# Patient Record
Sex: MALE | Race: WHITE | NOT HISPANIC OR LATINO | Employment: OTHER | URBAN - NONMETROPOLITAN AREA
[De-identification: names, ages, dates, MRNs, and addresses within clinical notes are randomized per-mention and may not be internally consistent; named-entity substitution may affect disease eponyms.]

---

## 2018-03-14 ENCOUNTER — TELEPHONE (OUTPATIENT)
Dept: GASTROENTEROLOGY | Facility: CLINIC | Age: 78
End: 2018-03-14

## 2018-11-12 RX ORDER — BENAZEPRIL HYDROCHLORIDE 10 MG/1
TABLET ORAL
Qty: 60 TABLET | Refills: 5 | Status: SHIPPED | OUTPATIENT
Start: 2018-11-12 | End: 2019-05-17 | Stop reason: SDUPTHER

## 2018-11-20 ENCOUNTER — TELEPHONE (OUTPATIENT)
Dept: FAMILY MEDICINE CLINIC | Facility: CLINIC | Age: 78
End: 2018-11-20

## 2018-11-20 DIAGNOSIS — R27.0 ATAXIA: Primary | ICD-10-CM

## 2018-12-03 ENCOUNTER — TELEPHONE (OUTPATIENT)
Dept: FAMILY MEDICINE CLINIC | Facility: CLINIC | Age: 78
End: 2018-12-03

## 2018-12-05 ENCOUNTER — CLINICAL SUPPORT (OUTPATIENT)
Dept: FAMILY MEDICINE CLINIC | Facility: CLINIC | Age: 78
End: 2018-12-05

## 2018-12-05 DIAGNOSIS — G50.0 TRIGEMINAL NEURALGIA: Primary | ICD-10-CM

## 2018-12-05 PROCEDURE — 96372 THER/PROPH/DIAG INJ SC/IM: CPT | Performed by: FAMILY MEDICINE

## 2018-12-05 RX ORDER — TRIAMCINOLONE ACETONIDE 40 MG/ML
80 INJECTION, SUSPENSION INTRA-ARTICULAR; INTRAMUSCULAR ONCE
Status: COMPLETED | OUTPATIENT
Start: 2018-12-05 | End: 2018-12-05

## 2018-12-05 RX ADMIN — TRIAMCINOLONE ACETONIDE 80 MG: 40 INJECTION, SUSPENSION INTRA-ARTICULAR; INTRAMUSCULAR at 13:55

## 2018-12-11 ENCOUNTER — TELEPHONE (OUTPATIENT)
Dept: FAMILY MEDICINE CLINIC | Facility: CLINIC | Age: 78
End: 2018-12-11

## 2019-01-02 ENCOUNTER — OUTSIDE FACILITY SERVICE (OUTPATIENT)
Dept: FAMILY MEDICINE CLINIC | Facility: CLINIC | Age: 79
End: 2019-01-02

## 2019-01-02 PROCEDURE — G0180 MD CERTIFICATION HHA PATIENT: HCPCS | Performed by: FAMILY MEDICINE

## 2019-01-14 ENCOUNTER — OFFICE VISIT (OUTPATIENT)
Dept: FAMILY MEDICINE CLINIC | Facility: CLINIC | Age: 79
End: 2019-01-14

## 2019-01-14 VITALS
DIASTOLIC BLOOD PRESSURE: 69 MMHG | HEART RATE: 92 BPM | TEMPERATURE: 97.7 F | OXYGEN SATURATION: 95 % | HEIGHT: 71 IN | WEIGHT: 175.6 LBS | SYSTOLIC BLOOD PRESSURE: 118 MMHG | BODY MASS INDEX: 24.58 KG/M2 | RESPIRATION RATE: 20 BRPM

## 2019-01-14 DIAGNOSIS — L57.0 ACTINIC KERATOSIS OF LEFT TEMPLE: Primary | ICD-10-CM

## 2019-01-14 DIAGNOSIS — I10 ESSENTIAL HYPERTENSION: ICD-10-CM

## 2019-01-14 DIAGNOSIS — N18.30 CKD (CHRONIC KIDNEY DISEASE) STAGE 3, GFR 30-59 ML/MIN (HCC): ICD-10-CM

## 2019-01-14 PROCEDURE — 99212 OFFICE O/P EST SF 10 MIN: CPT | Performed by: NURSE PRACTITIONER

## 2019-01-22 ENCOUNTER — PROCEDURE VISIT (OUTPATIENT)
Dept: FAMILY MEDICINE CLINIC | Facility: CLINIC | Age: 79
End: 2019-01-22

## 2019-01-22 VITALS
DIASTOLIC BLOOD PRESSURE: 60 MMHG | WEIGHT: 175 LBS | HEIGHT: 71 IN | BODY MASS INDEX: 24.5 KG/M2 | HEART RATE: 100 BPM | OXYGEN SATURATION: 96 % | SYSTOLIC BLOOD PRESSURE: 140 MMHG

## 2019-01-22 DIAGNOSIS — D22.39: Primary | ICD-10-CM

## 2019-01-22 DIAGNOSIS — G50.0 TRIGEMINAL NEURALGIA: ICD-10-CM

## 2019-01-22 PROCEDURE — 11310 SHAVE SKIN LESION 0.5 CM/<: CPT | Performed by: FAMILY MEDICINE

## 2019-01-22 RX ORDER — METHYLPREDNISOLONE ACETATE 80 MG/ML
80 INJECTION, SUSPENSION INTRA-ARTICULAR; INTRALESIONAL; INTRAMUSCULAR; SOFT TISSUE ONCE
Status: DISCONTINUED | OUTPATIENT
Start: 2019-01-22 | End: 2019-05-15

## 2019-01-22 RX ORDER — NEOMYCIN SULFATE, POLYMYXIN B SULFATE, AND DEXAMETHASONE 3.5; 10000; 1 MG/G; [USP'U]/G; MG/G
OINTMENT OPHTHALMIC
COMMUNITY
Start: 2019-01-18 | End: 2019-05-15

## 2019-01-23 DIAGNOSIS — L57.0 ACTINIC KERATOSIS OF LEFT CHEEK: Primary | ICD-10-CM

## 2019-01-25 ENCOUNTER — DOCUMENTATION (OUTPATIENT)
Dept: FAMILY MEDICINE CLINIC | Facility: CLINIC | Age: 79
End: 2019-01-25

## 2019-01-25 DIAGNOSIS — L57.0 ACTINIC KERATOSIS OF LEFT CHEEK: ICD-10-CM

## 2019-04-12 RX ORDER — LEVOTHYROXINE SODIUM 0.05 MG/1
TABLET ORAL
Qty: 90 TABLET | Refills: 1 | Status: SHIPPED | OUTPATIENT
Start: 2019-04-12 | End: 2019-05-15 | Stop reason: SDUPTHER

## 2019-05-15 ENCOUNTER — OFFICE VISIT (OUTPATIENT)
Dept: FAMILY MEDICINE CLINIC | Facility: CLINIC | Age: 79
End: 2019-05-15

## 2019-05-15 VITALS
WEIGHT: 165 LBS | TEMPERATURE: 98.7 F | HEART RATE: 78 BPM | OXYGEN SATURATION: 98 % | SYSTOLIC BLOOD PRESSURE: 124 MMHG | HEIGHT: 71 IN | BODY MASS INDEX: 23.1 KG/M2 | DIASTOLIC BLOOD PRESSURE: 72 MMHG

## 2019-05-15 DIAGNOSIS — N18.30 CKD (CHRONIC KIDNEY DISEASE) STAGE 3, GFR 30-59 ML/MIN (HCC): ICD-10-CM

## 2019-05-15 DIAGNOSIS — I10 ESSENTIAL HYPERTENSION: ICD-10-CM

## 2019-05-15 DIAGNOSIS — G50.0 TRIGEMINAL NEURALGIA: Primary | ICD-10-CM

## 2019-05-15 PROCEDURE — 96372 THER/PROPH/DIAG INJ SC/IM: CPT | Performed by: FAMILY MEDICINE

## 2019-05-15 PROCEDURE — 99213 OFFICE O/P EST LOW 20 MIN: CPT | Performed by: FAMILY MEDICINE

## 2019-05-15 RX ORDER — DOXYCYCLINE 100 MG/1
1 CAPSULE ORAL 2 TIMES DAILY
COMMUNITY
Start: 2019-04-15 | End: 2019-07-01 | Stop reason: SINTOL

## 2019-05-15 RX ORDER — METHYLPREDNISOLONE ACETATE 80 MG/ML
80 INJECTION, SUSPENSION INTRA-ARTICULAR; INTRALESIONAL; INTRAMUSCULAR; SOFT TISSUE ONCE
Status: COMPLETED | OUTPATIENT
Start: 2019-05-15 | End: 2019-05-15

## 2019-05-15 RX ORDER — OXYCODONE AND ACETAMINOPHEN 10; 325 MG/1; MG/1
1 TABLET ORAL EVERY 8 HOURS PRN
COMMUNITY
End: 2019-07-01 | Stop reason: SINTOL

## 2019-05-15 RX ADMIN — METHYLPREDNISOLONE ACETATE 80 MG: 80 INJECTION, SUSPENSION INTRA-ARTICULAR; INTRALESIONAL; INTRAMUSCULAR; SOFT TISSUE at 12:34

## 2019-05-18 RX ORDER — BENAZEPRIL HYDROCHLORIDE 10 MG/1
TABLET ORAL
Qty: 60 TABLET | Refills: 5 | Status: SHIPPED | OUTPATIENT
Start: 2019-05-18 | End: 2019-10-21 | Stop reason: SDUPTHER

## 2019-06-27 ENCOUNTER — HOSPITAL ENCOUNTER (EMERGENCY)
Age: 79
Discharge: HOME | End: 2019-06-27
Attending: EMERGENCY MEDICINE
Payer: MEDICARE

## 2019-06-27 VITALS
DIASTOLIC BLOOD PRESSURE: 78 MMHG | OXYGEN SATURATION: 98 % | SYSTOLIC BLOOD PRESSURE: 143 MMHG | TEMPERATURE: 98 F | HEART RATE: 78 BPM | RESPIRATION RATE: 20 BRPM

## 2019-06-27 DIAGNOSIS — R40.4 TRANSIENT ALTERATION OF AWARENESS: ICD-10-CM

## 2019-06-27 DIAGNOSIS — G47.51 CONFUSIONAL AROUSALS: Primary | ICD-10-CM

## 2019-06-27 LAB
ACETAMINOPHEN LEVEL: <15 UG/ML
ALBUMIN SERPL-MCNC: 4.2 G/DL (ref 3.5–5.2)
ALP BLD-CCNC: 53 U/L (ref 40–130)
ALT SERPL-CCNC: 9 U/L (ref 5–41)
AMPHETAMINE SCREEN, URINE: NEGATIVE
ANION GAP SERPL CALCULATED.3IONS-SCNC: 13 MMOL/L (ref 7–19)
AST SERPL-CCNC: 16 U/L (ref 5–40)
BARBITURATE SCREEN URINE: NEGATIVE
BASOPHILS ABSOLUTE: 0.1 K/UL (ref 0–0.2)
BASOPHILS RELATIVE PERCENT: 0.7 % (ref 0–1)
BENZODIAZEPINE SCREEN, URINE: NEGATIVE
BILIRUB SERPL-MCNC: 0.3 MG/DL (ref 0.2–1.2)
BILIRUBIN URINE: NEGATIVE
BLOOD, URINE: NEGATIVE
BUN BLDV-MCNC: 26 MG/DL (ref 8–23)
CALCIUM SERPL-MCNC: 10.3 MG/DL (ref 8.8–10.2)
CANNABINOID SCREEN URINE: NEGATIVE
CHLORIDE BLD-SCNC: 104 MMOL/L (ref 98–111)
CLARITY: CLEAR
CO2: 25 MMOL/L (ref 22–29)
COCAINE METABOLITE SCREEN URINE: NEGATIVE
COLOR: YELLOW
CREAT SERPL-MCNC: 1.8 MG/DL (ref 0.5–1.2)
EOSINOPHILS ABSOLUTE: 0.2 K/UL (ref 0–0.6)
EOSINOPHILS RELATIVE PERCENT: 2.6 % (ref 0–5)
ETHANOL: <10 MG/DL (ref 0–0.08)
GFR NON-AFRICAN AMERICAN: 37
GLUCOSE BLD-MCNC: 122 MG/DL (ref 74–109)
GLUCOSE URINE: NEGATIVE MG/DL
HCT VFR BLD CALC: 40.6 % (ref 42–52)
HEMOGLOBIN: 13.1 G/DL (ref 14–18)
KETONES, URINE: NEGATIVE MG/DL
LEUKOCYTE ESTERASE, URINE: NEGATIVE
LYMPHOCYTES ABSOLUTE: 3.5 K/UL (ref 1.1–4.5)
LYMPHOCYTES RELATIVE PERCENT: 48.6 % (ref 20–40)
Lab: ABNORMAL
MCH RBC QN AUTO: 31.1 PG (ref 27–31)
MCHC RBC AUTO-ENTMCNC: 32.3 G/DL (ref 33–37)
MCV RBC AUTO: 96.4 FL (ref 80–94)
MONOCYTES ABSOLUTE: 1 K/UL (ref 0–0.9)
MONOCYTES RELATIVE PERCENT: 13.5 % (ref 0–10)
NEUTROPHILS ABSOLUTE: 2.5 K/UL (ref 1.5–7.5)
NEUTROPHILS RELATIVE PERCENT: 34.3 % (ref 50–65)
NITRITE, URINE: NEGATIVE
OPIATE SCREEN URINE: POSITIVE
PDW BLD-RTO: 13.2 % (ref 11.5–14.5)
PH UA: 6.5 (ref 5–8)
PLATELET # BLD: 151 K/UL (ref 130–400)
PMV BLD AUTO: 11.7 FL (ref 9.4–12.4)
POTASSIUM SERPL-SCNC: 4.3 MMOL/L (ref 3.5–5)
PROTEIN UA: NEGATIVE MG/DL
RBC # BLD: 4.21 M/UL (ref 4.7–6.1)
SALICYLATE, SERUM: <3 MG/DL (ref 3–10)
SODIUM BLD-SCNC: 142 MMOL/L (ref 136–145)
SPECIFIC GRAVITY UA: 1.02 (ref 1–1.03)
TOTAL PROTEIN: 6.6 G/DL (ref 6.6–8.7)
URINE REFLEX TO CULTURE: NORMAL
UROBILINOGEN, URINE: 0.2 E.U./DL
WBC # BLD: 7.2 K/UL (ref 4.8–10.8)

## 2019-06-27 PROCEDURE — G0480 DRUG TEST DEF 1-7 CLASSES: HCPCS

## 2019-06-27 PROCEDURE — 85025 COMPLETE CBC W/AUTO DIFF WBC: CPT

## 2019-06-27 PROCEDURE — 99284 EMERGENCY DEPT VISIT MOD MDM: CPT

## 2019-06-27 PROCEDURE — 80307 DRUG TEST PRSMV CHEM ANLYZR: CPT

## 2019-06-27 PROCEDURE — 81003 URINALYSIS AUTO W/O SCOPE: CPT

## 2019-06-27 PROCEDURE — 80053 COMPREHEN METABOLIC PANEL: CPT

## 2019-06-27 PROCEDURE — 36415 COLL VENOUS BLD VENIPUNCTURE: CPT

## 2019-06-27 PROCEDURE — 99283 EMERGENCY DEPT VISIT LOW MDM: CPT | Performed by: EMERGENCY MEDICINE

## 2019-06-27 RX ORDER — GABAPENTIN 300 MG/1
800 CAPSULE ORAL 2 TIMES DAILY
COMMUNITY

## 2019-06-27 RX ORDER — LAMOTRIGINE 25 MG/1
25 TABLET ORAL DAILY
COMMUNITY

## 2019-06-27 RX ORDER — HYDROCODONE BITARTRATE AND ACETAMINOPHEN 5; 325 MG/1; MG/1
1 TABLET ORAL EVERY 6 HOURS PRN
COMMUNITY

## 2019-06-27 RX ORDER — BENAZEPRIL HYDROCHLORIDE 10 MG/1
10 TABLET ORAL 2 TIMES DAILY
COMMUNITY

## 2019-06-27 ASSESSMENT — ENCOUNTER SYMPTOMS
ABDOMINAL PAIN: 0
SINUS PRESSURE: 0
VOMITING: 0
CONSTIPATION: 0
BACK PAIN: 0
WHEEZING: 0
PHOTOPHOBIA: 0
NAUSEA: 0
DIARRHEA: 0
TROUBLE SWALLOWING: 0
EYE PAIN: 0
CHEST TIGHTNESS: 0
COLOR CHANGE: 0
SHORTNESS OF BREATH: 0

## 2019-06-27 NOTE — ED PROVIDER NOTES
Musculoskeletal: Negative for arthralgias, back pain, myalgias, neck pain and neck stiffness. Skin: Negative for color change, pallor and rash. Neurological: Negative for dizziness, facial asymmetry, speech difficulty, weakness, light-headedness, numbness and headaches. Psychiatric/Behavioral: Positive for agitation, behavioral problems and confusion. Negative for hallucinations and suicidal ideas. The patient is not nervous/anxious. PAST MEDICALHISTORY     Past Medical History:   Diagnosis Date    Hypertension     Thyroid disease     Trigeminal neuropathy          SURGICAL HISTORY     History reviewed. No pertinent surgical history. CURRENT MEDICATIONS     Previous Medications    BENAZEPRIL (LOTENSIN) 10 MG TABLET    Take 10 mg by mouth 2 times daily    DOXYCYCLINE PO    Take by mouth    GABAPENTIN (NEURONTIN) 300 MG CAPSULE    Take 300 mg by mouth 3 times daily. HYDROCODONE-ACETAMINOPHEN (NORCO) 5-325 MG PER TABLET    Take 1 tablet by mouth every 6 hours as needed for Pain. LAMOTRIGINE (LAMICTAL) 25 MG TABLET    Take 25 mg by mouth daily       ALLERGIES     Patient has no known allergies. FAMILY HISTORY     History reviewed. No pertinent family history.        SOCIAL HISTORY       Social History     Socioeconomic History    Marital status:      Spouse name: None    Number of children: None    Years of education: None    Highest education level: None   Occupational History    None   Social Needs    Financial resource strain: None    Food insecurity:     Worry: None     Inability: None    Transportation needs:     Medical: None     Non-medical: None   Tobacco Use    Smoking status: Never Smoker    Smokeless tobacco: Never Used   Substance and Sexual Activity    Alcohol use: Not Currently    Drug use: Never    Sexual activity: Yes     Partners: Female   Lifestyle    Physical activity:     Days per week: None     Minutes per session: None    Stress: None Relationships    Social connections:     Talks on phone: None     Gets together: None     Attends Yazidism service: None     Active member of club or organization: None     Attends meetings of clubs or organizations: None     Relationship status: None    Intimate partner violence:     Fear of current or ex partner: None     Emotionally abused: None     Physically abused: None     Forced sexual activity: None   Other Topics Concern    None   Social History Narrative    None       SCREENINGS             PHYSICAL EXAM    (up to 7 for level 4, 8 or more for level 5)     ED Triage Vitals [06/27/19 0134]   BP Temp Temp src Pulse Resp SpO2 Height Weight   (!) 167/88 97.6 °F (36.4 °C) -- 90 20 94 % -- --       Physical Exam   Constitutional: He is oriented to person, place, and time. He appears well-developed and well-nourished. No distress. HENT:   Head: Normocephalic and atraumatic. Mouth/Throat: Oropharynx is clear and moist.   Eyes: Pupils are equal, round, and reactive to light. Conjunctivae and EOM are normal.   Neck: Normal range of motion. Neck supple. Cardiovascular: Normal rate, regular rhythm and normal heart sounds. No murmur heard. Pulmonary/Chest: Effort normal and breath sounds normal. He has no wheezes. He has no rales. Abdominal: Soft. Bowel sounds are normal. He exhibits no distension. There is no tenderness. There is no rebound and no guarding. Musculoskeletal: Normal range of motion. He exhibits no edema. Neurological: He is alert and oriented to person, place, and time. No cranial nerve deficit. Skin: Skin is warm and dry. Capillary refill takes less than 2 seconds. Psychiatric: He has a normal mood and affect. His speech is normal and behavior is normal. Judgment and thought content normal. Cognition and memory are normal.   Nursing note and vitals reviewed.       DIAGNOSTIC RESULTS     EKG: All EKG's areinterpreted by the Emergency Department Physician who either signs or believe that pt has dementia or a psychosis. This could very well be related to the medication he is taking. Pt and spouse are in agreement with d/c planning. Patient Progress  Patient progress: stable      Reassessment      CONSULTS:  None    PROCEDURES:  Unless otherwise noted below, none     Procedures    FINAL IMPRESSION      1. Confusional arousals    2.  Transient alteration of awareness          DISPOSITION/PLAN   DISPOSITION Decision To Discharge 06/27/2019 04:37:35 AM      PATIENT REFERRED TO:  [address] [place] [address] EMERGENCY DEPT  [facility]  [phone]  Call in 2 days  As needed      DISCHARGE MEDICATIONS:  New Prescriptions    No medications on file          (Please note that portions of this note were completed with a voice recognition program.  Efforts were made to edit thedictations but occasionally words are mis-transcribed.)    Demond Arboleda MD (electronically signed)  Attending Emergency Physician          Rosalinda Graham MD  06/27/19 6503
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
## 2019-06-27 NOTE — ED NOTES
Patient placed in a gownPatient placed on cardiac monitor, continuous pulse oximeter, and NIBP monitor.  Monitor alarms on.       Betzaida Cortes RN  06/27/19 5522

## 2019-06-27 NOTE — ED NOTES
ASSESSMENT:    PT ALERT/ORIENTED X4. PUPILS EQUAL/REACTIVE    SKIN:  WARM/DRY PINK CAPILLARY REFILL < 2SECS    CARDIAC:  S1 S2 NOTED     LUNGS: CLEAR UPPER AND LOWER LOBES, RESPIRATIONS EVEN/UNLABORED     ABDOMEN: BOWEL SOUNDS NOTED UPPER AND LOWER QUADRANTS                     SOFT AND NONTENDER. EXTREMITIES:  BILATERAL DP AND PT AND NO EDEMA NOTED. NO DISTRESS NOTED. SIDE RAILS UP AND CALL LIGHT IN REACH.      Phylicia Diaz RN  06/27/19 3875
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
## 2019-06-28 ENCOUNTER — TELEPHONE (OUTPATIENT)
Dept: FAMILY MEDICINE CLINIC | Facility: CLINIC | Age: 79
End: 2019-06-28

## 2019-07-01 ENCOUNTER — OFFICE VISIT (OUTPATIENT)
Dept: FAMILY MEDICINE CLINIC | Facility: CLINIC | Age: 79
End: 2019-07-01

## 2019-07-01 VITALS
HEIGHT: 71 IN | HEART RATE: 59 BPM | DIASTOLIC BLOOD PRESSURE: 64 MMHG | SYSTOLIC BLOOD PRESSURE: 130 MMHG | BODY MASS INDEX: 23.72 KG/M2 | WEIGHT: 169.4 LBS | TEMPERATURE: 97.9 F | OXYGEN SATURATION: 97 %

## 2019-07-01 DIAGNOSIS — G50.0 TRIGEMINAL NEURALGIA: Primary | ICD-10-CM

## 2019-07-01 DIAGNOSIS — F03.91 DEMENTIA WITH BEHAVIORAL DISTURBANCE, UNSPECIFIED DEMENTIA TYPE: ICD-10-CM

## 2019-07-01 DIAGNOSIS — R27.0 ATAXIA: ICD-10-CM

## 2019-07-01 DIAGNOSIS — N18.30 CKD (CHRONIC KIDNEY DISEASE) STAGE 3, GFR 30-59 ML/MIN (HCC): ICD-10-CM

## 2019-07-01 PROBLEM — F03.918 DEMENTIA WITH BEHAVIORAL DISTURBANCE: Status: ACTIVE | Noted: 2019-07-01

## 2019-07-01 PROCEDURE — 99214 OFFICE O/P EST MOD 30 MIN: CPT | Performed by: FAMILY MEDICINE

## 2019-07-01 RX ORDER — GABAPENTIN 800 MG/1
800 TABLET ORAL 3 TIMES DAILY
Qty: 270 TABLET | Refills: 1 | Status: SHIPPED | OUTPATIENT
Start: 2019-07-01 | End: 2020-01-17

## 2019-07-01 RX ORDER — DOXYCYCLINE HYCLATE 100 MG/1
100 CAPSULE ORAL DAILY
COMMUNITY
End: 2019-11-20

## 2019-10-21 RX ORDER — LEVOTHYROXINE SODIUM 0.05 MG/1
TABLET ORAL
Qty: 90 TABLET | Refills: 1 | Status: SHIPPED | OUTPATIENT
Start: 2019-10-21 | End: 2019-11-20 | Stop reason: SDUPTHER

## 2019-10-21 RX ORDER — BENAZEPRIL HYDROCHLORIDE 10 MG/1
TABLET ORAL
Qty: 60 TABLET | Refills: 5 | Status: SHIPPED | OUTPATIENT
Start: 2019-10-21 | End: 2020-04-16

## 2019-10-23 ENCOUNTER — TELEPHONE (OUTPATIENT)
Dept: FAMILY MEDICINE CLINIC | Facility: CLINIC | Age: 79
End: 2019-10-23

## 2019-10-23 ENCOUNTER — HOSPITAL ENCOUNTER (OUTPATIENT)
Dept: GENERAL RADIOLOGY | Facility: HOSPITAL | Age: 79
Discharge: HOME | End: 2019-10-23
Admitting: FAMILY MEDICINE

## 2019-10-23 DIAGNOSIS — R07.81 RIB PAIN ON LEFT SIDE: ICD-10-CM

## 2019-10-23 DIAGNOSIS — R07.81 RIB PAIN ON LEFT SIDE: Primary | ICD-10-CM

## 2019-10-23 PROCEDURE — 71101 X-RAY EXAM UNILAT RIBS/CHEST: CPT

## 2019-10-30 ENCOUNTER — TELEPHONE (OUTPATIENT)
Dept: FAMILY MEDICINE CLINIC | Facility: CLINIC | Age: 79
End: 2019-10-30

## 2019-11-20 ENCOUNTER — PROCEDURE VISIT (OUTPATIENT)
Dept: FAMILY MEDICINE CLINIC | Facility: CLINIC | Age: 79
End: 2019-11-20

## 2019-11-20 VITALS
WEIGHT: 170.6 LBS | HEART RATE: 84 BPM | SYSTOLIC BLOOD PRESSURE: 168 MMHG | TEMPERATURE: 98.4 F | OXYGEN SATURATION: 98 % | DIASTOLIC BLOOD PRESSURE: 84 MMHG | BODY MASS INDEX: 23.88 KG/M2 | HEIGHT: 71 IN

## 2019-11-20 DIAGNOSIS — M17.10 KNEE ARTHROPATHY: ICD-10-CM

## 2019-11-20 DIAGNOSIS — C44.92 SCCA (SQUAMOUS CELL CARCINOMA) OF SKIN: Primary | ICD-10-CM

## 2019-11-20 DIAGNOSIS — R27.0 ATAXIA: ICD-10-CM

## 2019-11-20 DIAGNOSIS — R25.1 OCCASIONAL TREMORS: ICD-10-CM

## 2019-11-20 DIAGNOSIS — I10 ESSENTIAL HYPERTENSION: ICD-10-CM

## 2019-11-20 DIAGNOSIS — G50.0 TRIGEMINAL NEURALGIA OF RIGHT SIDE OF FACE: ICD-10-CM

## 2019-11-20 DIAGNOSIS — N18.30 CKD (CHRONIC KIDNEY DISEASE) STAGE 3, GFR 30-59 ML/MIN (HCC): ICD-10-CM

## 2019-11-20 PROCEDURE — 99214 OFFICE O/P EST MOD 30 MIN: CPT | Performed by: FAMILY MEDICINE

## 2019-11-20 PROCEDURE — 96372 THER/PROPH/DIAG INJ SC/IM: CPT | Performed by: FAMILY MEDICINE

## 2019-11-20 RX ORDER — METHYLPREDNISOLONE ACETATE 80 MG/ML
80 INJECTION, SUSPENSION INTRA-ARTICULAR; INTRALESIONAL; INTRAMUSCULAR; SOFT TISSUE ONCE
Status: COMPLETED | OUTPATIENT
Start: 2019-11-20 | End: 2019-11-20

## 2019-11-20 RX ORDER — LAMOTRIGINE 200 MG/1
1 TABLET ORAL 2 TIMES DAILY
COMMUNITY
Start: 2019-10-21 | End: 2020-07-14 | Stop reason: SDUPTHER

## 2019-11-20 RX ORDER — METHYLPREDNISOLONE ACETATE 40 MG/ML
80 INJECTION, SUSPENSION INTRA-ARTICULAR; INTRALESIONAL; INTRAMUSCULAR; SOFT TISSUE ONCE
Status: DISCONTINUED | OUTPATIENT
Start: 2019-11-20 | End: 2019-11-20

## 2019-11-20 RX ORDER — ERYTHROMYCIN 5 MG/G
OINTMENT OPHTHALMIC 2 TIMES DAILY
COMMUNITY
Start: 2019-10-09

## 2019-11-20 RX ADMIN — METHYLPREDNISOLONE ACETATE 80 MG: 80 INJECTION, SUSPENSION INTRA-ARTICULAR; INTRALESIONAL; INTRAMUSCULAR; SOFT TISSUE at 16:45

## 2019-11-25 ENCOUNTER — CLINICAL SUPPORT (OUTPATIENT)
Dept: FAMILY MEDICINE CLINIC | Facility: CLINIC | Age: 79
End: 2019-11-25

## 2019-12-02 LAB
ALBUMIN SERPL-MCNC: 4.7 G/DL (ref 3.5–4.7)
ALBUMIN/GLOB SERPL: 2.8 {RATIO} (ref 1.2–2.2)
ALP SERPL-CCNC: 72 IU/L (ref 39–117)
ALT SERPL-CCNC: 10 IU/L (ref 0–44)
AST SERPL-CCNC: 11 IU/L (ref 0–40)
BASOPHILS # BLD AUTO: 0 X10E3/UL (ref 0–0.2)
BASOPHILS NFR BLD AUTO: 0 %
BILIRUB SERPL-MCNC: 0.3 MG/DL (ref 0–1.2)
BUN SERPL-MCNC: 17 MG/DL (ref 8–27)
BUN/CREAT SERPL: 11 (ref 10–24)
CALCIUM SERPL-MCNC: 10.2 MG/DL (ref 8.6–10.2)
CHLORIDE SERPL-SCNC: 103 MMOL/L (ref 96–106)
CHOLEST SERPL-MCNC: 177 MG/DL (ref 100–199)
CO2 SERPL-SCNC: 22 MMOL/L (ref 20–29)
CREAT SERPL-MCNC: 1.52 MG/DL (ref 0.76–1.27)
EOSINOPHIL # BLD AUTO: 0.1 X10E3/UL (ref 0–0.4)
EOSINOPHIL NFR BLD AUTO: 1 %
ERYTHROCYTE [DISTWIDTH] IN BLOOD BY AUTOMATED COUNT: 14 % (ref 12.3–15.4)
GLOBULIN SER CALC-MCNC: 1.7 G/DL (ref 1.5–4.5)
GLUCOSE SERPL-MCNC: 132 MG/DL (ref 65–99)
HCT VFR BLD AUTO: 44.4 % (ref 37.5–51)
HDLC SERPL-MCNC: 70 MG/DL
HGB BLD-MCNC: 14.8 G/DL (ref 13–17.7)
IMM GRANULOCYTES # BLD AUTO: 0 X10E3/UL (ref 0–0.1)
IMM GRANULOCYTES NFR BLD AUTO: 0 %
LAMOTRIGINE SERPL-MCNC: 15.5 UG/ML (ref 2–20)
LDLC SERPL CALC-MCNC: 93 MG/DL (ref 0–99)
LYMPHOCYTES # BLD AUTO: 3.8 X10E3/UL (ref 0.7–3.1)
LYMPHOCYTES NFR BLD AUTO: 44 %
MCH RBC QN AUTO: 30.5 PG (ref 26.6–33)
MCHC RBC AUTO-ENTMCNC: 33.3 G/DL (ref 31.5–35.7)
MCV RBC AUTO: 92 FL (ref 79–97)
MONOCYTES # BLD AUTO: 1 X10E3/UL (ref 0.1–0.9)
MONOCYTES NFR BLD AUTO: 12 %
NEUTROPHILS # BLD AUTO: 3.6 X10E3/UL (ref 1.4–7)
NEUTROPHILS NFR BLD AUTO: 43 %
PLATELET # BLD AUTO: 160 X10E3/UL (ref 150–450)
POTASSIUM SERPL-SCNC: 4.5 MMOL/L (ref 3.5–5.2)
PROT SERPL-MCNC: 6.4 G/DL (ref 6–8.5)
RBC # BLD AUTO: 4.85 X10E6/UL (ref 4.14–5.8)
SODIUM SERPL-SCNC: 143 MMOL/L (ref 134–144)
T4 SERPL-MCNC: 8 UG/DL (ref 4.5–12)
TRIGL SERPL-MCNC: 71 MG/DL (ref 0–149)
TSH SERPL DL<=0.005 MIU/L-ACNC: 5.45 UIU/ML (ref 0.45–4.5)
VLDLC SERPL CALC-MCNC: 14 MG/DL (ref 5–40)
WBC # BLD AUTO: 8.4 X10E3/UL (ref 3.4–10.8)

## 2020-01-17 DIAGNOSIS — G50.0 TRIGEMINAL NEURALGIA: ICD-10-CM

## 2020-01-17 RX ORDER — GABAPENTIN 800 MG/1
TABLET ORAL
Qty: 270 TABLET | Refills: 1 | Status: SHIPPED | OUTPATIENT
Start: 2020-01-17 | End: 2020-07-07 | Stop reason: SDUPTHER

## 2020-04-16 RX ORDER — BENAZEPRIL HYDROCHLORIDE 10 MG/1
TABLET ORAL
Qty: 60 TABLET | Refills: 5 | Status: SHIPPED | OUTPATIENT
Start: 2020-04-16 | End: 2020-10-05

## 2020-04-16 RX ORDER — LEVOTHYROXINE SODIUM 0.05 MG/1
TABLET ORAL
Qty: 90 TABLET | Refills: 1 | Status: SHIPPED | OUTPATIENT
Start: 2020-04-16 | End: 2020-10-05

## 2020-06-23 ENCOUNTER — OUTSIDE FACILITY SERVICE (OUTPATIENT)
Dept: FAMILY MEDICINE CLINIC | Facility: CLINIC | Age: 80
End: 2020-06-23

## 2020-06-23 ENCOUNTER — CLINICAL SUPPORT (OUTPATIENT)
Dept: FAMILY MEDICINE CLINIC | Facility: CLINIC | Age: 80
End: 2020-06-23

## 2020-06-23 DIAGNOSIS — G50.0 TRIGEMINAL NEURALGIA: ICD-10-CM

## 2020-06-23 DIAGNOSIS — Z12.5 SPECIAL SCREENING FOR MALIGNANT NEOPLASM OF PROSTATE: ICD-10-CM

## 2020-06-23 DIAGNOSIS — Z00.00 MEDICARE ANNUAL WELLNESS VISIT, SUBSEQUENT: ICD-10-CM

## 2020-06-23 DIAGNOSIS — Z79.899 LONG-TERM USE OF HIGH-RISK MEDICATION: Primary | ICD-10-CM

## 2020-06-23 DIAGNOSIS — I10 ESSENTIAL HYPERTENSION: ICD-10-CM

## 2020-06-23 DIAGNOSIS — N18.30 CKD (CHRONIC KIDNEY DISEASE) STAGE 3, GFR 30-59 ML/MIN (HCC): ICD-10-CM

## 2020-06-24 LAB
ALBUMIN SERPL-MCNC: 4.2 G/DL (ref 3.7–4.7)
ALBUMIN/GLOB SERPL: 1.9 {RATIO} (ref 1.2–2.2)
ALP SERPL-CCNC: 65 IU/L (ref 39–117)
ALT SERPL-CCNC: 11 IU/L (ref 0–44)
AST SERPL-CCNC: 16 IU/L (ref 0–40)
BASOPHILS # BLD AUTO: 0 X10E3/UL (ref 0–0.2)
BASOPHILS NFR BLD AUTO: 1 %
BILIRUB SERPL-MCNC: 0.4 MG/DL (ref 0–1.2)
BUN SERPL-MCNC: 15 MG/DL (ref 8–27)
BUN/CREAT SERPL: 8 (ref 10–24)
CALCIUM SERPL-MCNC: 9.7 MG/DL (ref 8.6–10.2)
CHLORIDE SERPL-SCNC: 107 MMOL/L (ref 96–106)
CHOLEST SERPL-MCNC: 153 MG/DL (ref 100–199)
CO2 SERPL-SCNC: 23 MMOL/L (ref 20–29)
CREAT SERPL-MCNC: 1.84 MG/DL (ref 0.76–1.27)
EOSINOPHIL # BLD AUTO: 0 X10E3/UL (ref 0–0.4)
EOSINOPHIL NFR BLD AUTO: 0 %
ERYTHROCYTE [DISTWIDTH] IN BLOOD BY AUTOMATED COUNT: 12.4 % (ref 11.6–15.4)
GLOBULIN SER CALC-MCNC: 2.2 G/DL (ref 1.5–4.5)
GLUCOSE SERPL-MCNC: 99 MG/DL (ref 65–99)
HCT VFR BLD AUTO: 39.9 % (ref 37.5–51)
HDLC SERPL-MCNC: 54 MG/DL
HGB BLD-MCNC: 13.7 G/DL (ref 13–17.7)
IMM GRANULOCYTES # BLD AUTO: 0 X10E3/UL (ref 0–0.1)
IMM GRANULOCYTES NFR BLD AUTO: 0 %
LDLC SERPL CALC-MCNC: 86 MG/DL (ref 0–99)
LDLC/HDLC SERPL: 1.6 RATIO (ref 0–3.6)
LYMPHOCYTES # BLD AUTO: 1.3 X10E3/UL (ref 0.7–3.1)
LYMPHOCYTES NFR BLD AUTO: 25 %
MCH RBC QN AUTO: 31.5 PG (ref 26.6–33)
MCHC RBC AUTO-ENTMCNC: 34.3 G/DL (ref 31.5–35.7)
MCV RBC AUTO: 92 FL (ref 79–97)
MONOCYTES # BLD AUTO: 0.9 X10E3/UL (ref 0.1–0.9)
MONOCYTES NFR BLD AUTO: 18 %
NEUTROPHILS # BLD AUTO: 2.8 X10E3/UL (ref 1.4–7)
NEUTROPHILS NFR BLD AUTO: 56 %
PLATELET # BLD AUTO: 133 X10E3/UL (ref 150–450)
POTASSIUM SERPL-SCNC: 4.4 MMOL/L (ref 3.5–5.2)
PROT SERPL-MCNC: 6.4 G/DL (ref 6–8.5)
PSA SERPL-MCNC: 0.9 NG/ML (ref 0–4)
RBC # BLD AUTO: 4.35 X10E6/UL (ref 4.14–5.8)
SODIUM SERPL-SCNC: 142 MMOL/L (ref 134–144)
T4 SERPL-MCNC: 8 UG/DL (ref 4.5–12)
TRIGL SERPL-MCNC: 67 MG/DL (ref 0–149)
TSH SERPL DL<=0.005 MIU/L-ACNC: 2.55 UIU/ML (ref 0.45–4.5)
VLDLC SERPL CALC-MCNC: 13 MG/DL (ref 5–40)
WBC # BLD AUTO: 5 X10E3/UL (ref 3.4–10.8)

## 2020-06-24 PROCEDURE — 99218 PR INITIAL OBSERVATION CARE/DAY 30 MINUTES: CPT | Performed by: FAMILY MEDICINE

## 2020-06-25 ENCOUNTER — APPOINTMENT (OUTPATIENT)
Dept: GENERAL RADIOLOGY | Age: 80
DRG: 868 | End: 2020-06-25
Attending: HOSPITALIST
Payer: MEDICARE

## 2020-06-25 ENCOUNTER — APPOINTMENT (OUTPATIENT)
Dept: CT IMAGING | Age: 80
DRG: 868 | End: 2020-06-25
Attending: HOSPITALIST
Payer: MEDICARE

## 2020-06-25 ENCOUNTER — HOSPITAL ENCOUNTER (INPATIENT)
Age: 80
LOS: 8 days | Discharge: HOME HEALTH | DRG: 868 | End: 2020-07-03
Attending: HOSPITALIST | Admitting: INTERNAL MEDICINE
Payer: MEDICARE

## 2020-06-25 PROBLEM — R00.1 SYMPTOMATIC BRADYCARDIA: Status: ACTIVE | Noted: 2020-06-25

## 2020-06-25 LAB
ALBUMIN SERPL-MCNC: 3.6 G/DL (ref 3.5–5.2)
ALP BLD-CCNC: 79 U/L (ref 40–130)
ALT SERPL-CCNC: 22 U/L (ref 5–41)
ANION GAP SERPL CALCULATED.3IONS-SCNC: 11 MMOL/L (ref 7–19)
APTT: 34.2 SEC (ref 26–36.2)
AST SERPL-CCNC: 49 U/L (ref 5–40)
BACTERIA: NEGATIVE /HPF
BASE EXCESS ARTERIAL: -4.3 MMOL/L (ref -2–2)
BASOPHILS ABSOLUTE: 0 K/UL (ref 0–0.2)
BASOPHILS RELATIVE PERCENT: 0.9 % (ref 0–1)
BILIRUB SERPL-MCNC: 0.7 MG/DL (ref 0.2–1.2)
BILIRUBIN URINE: NEGATIVE
BLOOD, URINE: ABNORMAL
BUN BLDV-MCNC: 15 MG/DL (ref 8–23)
C-REACTIVE PROTEIN: 5.28 MG/DL (ref 0–0.5)
CALCIUM SERPL-MCNC: 8.8 MG/DL (ref 8.8–10.2)
CARBOXYHEMOGLOBIN ARTERIAL: 2 % (ref 0–5)
CHLORIDE BLD-SCNC: 107 MMOL/L (ref 98–111)
CLARITY: CLEAR
CO2: 21 MMOL/L (ref 22–29)
COLOR: YELLOW
CREAT SERPL-MCNC: 1.7 MG/DL (ref 0.5–1.2)
D DIMER: 4.29 UG/ML FEU (ref 0–0.48)
EOSINOPHILS ABSOLUTE: 0 K/UL (ref 0–0.6)
EOSINOPHILS RELATIVE PERCENT: 0.3 % (ref 0–5)
EPITHELIAL CELLS, UA: 1 /HPF (ref 0–5)
GFR NON-AFRICAN AMERICAN: 39
GLUCOSE BLD-MCNC: 96 MG/DL (ref 74–109)
GLUCOSE URINE: NEGATIVE MG/DL
HBA1C MFR BLD: 5.4 % (ref 4–6)
HCO3 ARTERIAL: 17.8 MMOL/L (ref 22–26)
HCT VFR BLD CALC: 42.5 % (ref 42–52)
HEMOGLOBIN, ART, EXTENDED: 13.6 G/DL (ref 14–18)
HEMOGLOBIN: 13.9 G/DL (ref 14–18)
HYALINE CASTS: 0 /HPF (ref 0–8)
IMMATURE GRANULOCYTES #: 0 K/UL
INR BLD: 1.24 (ref 0.88–1.18)
KETONES, URINE: 15 MG/DL
LACTIC ACID, SEPSIS: 1.4 MG/DL (ref 0.5–1.9)
LACTIC ACID, SEPSIS: 1.5 MG/DL (ref 0.5–1.9)
LEUKOCYTE ESTERASE, URINE: NEGATIVE
LV EF: 60 %
LVEF MODALITY: NORMAL
LYMPHOCYTES ABSOLUTE: 0.9 K/UL (ref 1.1–4.5)
LYMPHOCYTES RELATIVE PERCENT: 27 % (ref 20–40)
MAGNESIUM: 1.8 MG/DL (ref 1.6–2.4)
MCH RBC QN AUTO: 31.4 PG (ref 27–31)
MCHC RBC AUTO-ENTMCNC: 32.7 G/DL (ref 33–37)
MCV RBC AUTO: 95.9 FL (ref 80–94)
METHEMOGLOBIN ARTERIAL: 1.1 %
MONOCYTES ABSOLUTE: 0.3 K/UL (ref 0–0.9)
MONOCYTES RELATIVE PERCENT: 8.6 % (ref 0–10)
NEUTROPHILS ABSOLUTE: 2.2 K/UL (ref 1.5–7.5)
NEUTROPHILS RELATIVE PERCENT: 62.6 % (ref 50–65)
NITRITE, URINE: NEGATIVE
O2 CONTENT ARTERIAL: 18 ML/DL
O2 SAT, ARTERIAL: 94.4 %
O2 THERAPY: ABNORMAL
PCO2 ARTERIAL: 25 MMHG (ref 35–45)
PDW BLD-RTO: 12.8 % (ref 11.5–14.5)
PH ARTERIAL: 7.46 (ref 7.35–7.45)
PH UA: 6 (ref 5–8)
PHOSPHORUS: 2 MG/DL (ref 2.5–4.5)
PLATELET # BLD: 80 K/UL (ref 130–400)
PMV BLD AUTO: 12.4 FL (ref 9.4–12.4)
PO2 ARTERIAL: 65 MMHG (ref 80–100)
POTASSIUM REFLEX MAGNESIUM: 4.1 MMOL/L (ref 3.5–5)
POTASSIUM, WHOLE BLOOD: 4
PRO-BNP: 3287 PG/ML (ref 0–1800)
PROCALCITONIN: 0.35 NG/ML (ref 0–0.09)
PROTEIN UA: 30 MG/DL
PROTHROMBIN TIME: 15.6 SEC (ref 12–14.6)
RBC # BLD: 4.43 M/UL (ref 4.7–6.1)
RBC UA: 37 /HPF (ref 0–4)
SARS-COV-2, NAAT: NOT DETECTED
SEDIMENTATION RATE, ERYTHROCYTE: 4 MM/HR (ref 0–15)
SODIUM BLD-SCNC: 139 MMOL/L (ref 136–145)
SPECIFIC GRAVITY UA: 1.03 (ref 1–1.03)
TOTAL PROTEIN: 5.7 G/DL (ref 6.6–8.7)
TROPONIN: 0.02 NG/ML (ref 0–0.03)
TROPONIN: 0.03 NG/ML (ref 0–0.03)
TROPONIN: 0.03 NG/ML (ref 0–0.03)
TROPONIN: 0.04 NG/ML (ref 0–0.03)
TROPONIN: <0.01 NG/ML (ref 0–0.03)
TROPONIN: <0.01 NG/ML (ref 0–0.03)
TSH REFLEX FT4: 1.99 UIU/ML (ref 0.35–5.5)
UROBILINOGEN, URINE: 1 E.U./DL
WBC # BLD: 3.5 K/UL (ref 4.8–10.8)
WBC UA: 1 /HPF (ref 0–5)

## 2020-06-25 PROCEDURE — 6370000000 HC RX 637 (ALT 250 FOR IP): Performed by: HOSPITALIST

## 2020-06-25 PROCEDURE — 84443 ASSAY THYROID STIM HORMONE: CPT

## 2020-06-25 PROCEDURE — 85610 PROTHROMBIN TIME: CPT

## 2020-06-25 PROCEDURE — 93306 TTE W/DOPPLER COMPLETE: CPT

## 2020-06-25 PROCEDURE — 51702 INSERT TEMP BLADDER CATH: CPT

## 2020-06-25 PROCEDURE — 94640 AIRWAY INHALATION TREATMENT: CPT

## 2020-06-25 PROCEDURE — 2580000003 HC RX 258: Performed by: HOSPITALIST

## 2020-06-25 PROCEDURE — 93970 EXTREMITY STUDY: CPT

## 2020-06-25 PROCEDURE — 83880 ASSAY OF NATRIURETIC PEPTIDE: CPT

## 2020-06-25 PROCEDURE — 81001 URINALYSIS AUTO W/SCOPE: CPT

## 2020-06-25 PROCEDURE — 36415 COLL VENOUS BLD VENIPUNCTURE: CPT

## 2020-06-25 PROCEDURE — 6360000002 HC RX W HCPCS: Performed by: HOSPITALIST

## 2020-06-25 PROCEDURE — 71275 CT ANGIOGRAPHY CHEST: CPT

## 2020-06-25 PROCEDURE — 84132 ASSAY OF SERUM POTASSIUM: CPT

## 2020-06-25 PROCEDURE — 36600 WITHDRAWAL OF ARTERIAL BLOOD: CPT

## 2020-06-25 PROCEDURE — 85730 THROMBOPLASTIN TIME PARTIAL: CPT

## 2020-06-25 PROCEDURE — 6360000002 HC RX W HCPCS: Performed by: INTERNAL MEDICINE

## 2020-06-25 PROCEDURE — 82803 BLOOD GASES ANY COMBINATION: CPT

## 2020-06-25 PROCEDURE — 2000000000 HC ICU R&B

## 2020-06-25 PROCEDURE — 84145 PROCALCITONIN (PCT): CPT

## 2020-06-25 PROCEDURE — 71045 X-RAY EXAM CHEST 1 VIEW: CPT

## 2020-06-25 PROCEDURE — 83036 HEMOGLOBIN GLYCOSYLATED A1C: CPT

## 2020-06-25 PROCEDURE — 99217 PR OBSERVATION CARE DISCHARGE MANAGEMENT: CPT | Performed by: FAMILY MEDICINE

## 2020-06-25 PROCEDURE — 84100 ASSAY OF PHOSPHORUS: CPT

## 2020-06-25 PROCEDURE — 93880 EXTRACRANIAL BILAT STUDY: CPT

## 2020-06-25 PROCEDURE — 85379 FIBRIN DEGRADATION QUANT: CPT

## 2020-06-25 PROCEDURE — 87040 BLOOD CULTURE FOR BACTERIA: CPT

## 2020-06-25 PROCEDURE — 70450 CT HEAD/BRAIN W/O DYE: CPT

## 2020-06-25 PROCEDURE — 83735 ASSAY OF MAGNESIUM: CPT

## 2020-06-25 PROCEDURE — 85652 RBC SED RATE AUTOMATED: CPT

## 2020-06-25 PROCEDURE — 87086 URINE CULTURE/COLONY COUNT: CPT

## 2020-06-25 PROCEDURE — 83605 ASSAY OF LACTIC ACID: CPT

## 2020-06-25 PROCEDURE — 6360000004 HC RX CONTRAST MEDICATION: Performed by: HOSPITALIST

## 2020-06-25 PROCEDURE — 86140 C-REACTIVE PROTEIN: CPT

## 2020-06-25 PROCEDURE — 99283 EMERGENCY DEPT VISIT LOW MDM: CPT | Performed by: INTERNAL MEDICINE

## 2020-06-25 PROCEDURE — U0002 COVID-19 LAB TEST NON-CDC: HCPCS

## 2020-06-25 PROCEDURE — 85025 COMPLETE CBC W/AUTO DIFF WBC: CPT

## 2020-06-25 PROCEDURE — 80053 COMPREHEN METABOLIC PANEL: CPT

## 2020-06-25 PROCEDURE — 84484 ASSAY OF TROPONIN QUANT: CPT

## 2020-06-25 RX ORDER — LANOLIN ALCOHOL/MO/W.PET/CERES
3 CREAM (GRAM) TOPICAL NIGHTLY PRN
Status: DISCONTINUED | OUTPATIENT
Start: 2020-06-25 | End: 2020-06-29

## 2020-06-25 RX ORDER — SODIUM CHLORIDE 0.9 % (FLUSH) 0.9 %
10 SYRINGE (ML) INJECTION PRN
Status: DISCONTINUED | OUTPATIENT
Start: 2020-06-25 | End: 2020-07-03 | Stop reason: HOSPADM

## 2020-06-25 RX ORDER — GABAPENTIN 300 MG/1
300 CAPSULE ORAL 3 TIMES DAILY
Status: DISCONTINUED | OUTPATIENT
Start: 2020-06-25 | End: 2020-07-02

## 2020-06-25 RX ORDER — ATORVASTATIN CALCIUM 40 MG/1
40 TABLET, FILM COATED ORAL NIGHTLY
Status: DISCONTINUED | OUTPATIENT
Start: 2020-06-25 | End: 2020-06-26

## 2020-06-25 RX ORDER — ONDANSETRON 4 MG/1
4 TABLET, ORALLY DISINTEGRATING ORAL EVERY 8 HOURS PRN
Status: DISCONTINUED | OUTPATIENT
Start: 2020-06-25 | End: 2020-07-03 | Stop reason: HOSPADM

## 2020-06-25 RX ORDER — ACETAMINOPHEN 325 MG/1
650 TABLET ORAL EVERY 6 HOURS PRN
Status: DISCONTINUED | OUTPATIENT
Start: 2020-06-25 | End: 2020-06-29

## 2020-06-25 RX ORDER — NITROGLYCERIN 0.4 MG/1
0.4 TABLET SUBLINGUAL EVERY 5 MIN PRN
Status: DISCONTINUED | OUTPATIENT
Start: 2020-06-25 | End: 2020-07-03 | Stop reason: HOSPADM

## 2020-06-25 RX ORDER — NALOXONE HYDROCHLORIDE 0.4 MG/ML
0.4 INJECTION, SOLUTION INTRAMUSCULAR; INTRAVENOUS; SUBCUTANEOUS PRN
Status: DISCONTINUED | OUTPATIENT
Start: 2020-06-25 | End: 2020-07-03 | Stop reason: HOSPADM

## 2020-06-25 RX ORDER — DOBUTAMINE HYDROCHLORIDE 200 MG/100ML
2.5 INJECTION INTRAVENOUS CONTINUOUS
Status: DISCONTINUED | OUTPATIENT
Start: 2020-06-25 | End: 2020-06-28

## 2020-06-25 RX ORDER — HYDRALAZINE HYDROCHLORIDE 20 MG/ML
10 INJECTION INTRAMUSCULAR; INTRAVENOUS EVERY 4 HOURS PRN
Status: DISCONTINUED | OUTPATIENT
Start: 2020-06-25 | End: 2020-07-03 | Stop reason: HOSPADM

## 2020-06-25 RX ORDER — MORPHINE SULFATE 4 MG/ML
1 INJECTION, SOLUTION INTRAMUSCULAR; INTRAVENOUS EVERY 4 HOURS PRN
Status: DISCONTINUED | OUTPATIENT
Start: 2020-06-25 | End: 2020-07-03 | Stop reason: HOSPADM

## 2020-06-25 RX ORDER — GABAPENTIN 400 MG/1
400 CAPSULE ORAL 3 TIMES DAILY
Status: DISCONTINUED | OUTPATIENT
Start: 2020-06-25 | End: 2020-06-25

## 2020-06-25 RX ORDER — DOPAMINE HYDROCHLORIDE 160 MG/100ML
2.5 INJECTION, SOLUTION INTRAVENOUS CONTINUOUS
Status: DISCONTINUED | OUTPATIENT
Start: 2020-06-25 | End: 2020-06-28

## 2020-06-25 RX ORDER — LEVOFLOXACIN 5 MG/ML
250 INJECTION, SOLUTION INTRAVENOUS EVERY 24 HOURS
Status: DISCONTINUED | OUTPATIENT
Start: 2020-06-26 | End: 2020-06-27

## 2020-06-25 RX ORDER — ACETAMINOPHEN 650 MG/1
650 SUPPOSITORY RECTAL EVERY 6 HOURS PRN
Status: DISCONTINUED | OUTPATIENT
Start: 2020-06-25 | End: 2020-06-29

## 2020-06-25 RX ORDER — LEVOTHYROXINE SODIUM 0.05 MG/1
50 TABLET ORAL DAILY
Status: DISCONTINUED | OUTPATIENT
Start: 2020-06-25 | End: 2020-07-03 | Stop reason: HOSPADM

## 2020-06-25 RX ORDER — SODIUM CHLORIDE 0.9 % (FLUSH) 0.9 %
10 SYRINGE (ML) INJECTION EVERY 12 HOURS SCHEDULED
Status: DISCONTINUED | OUTPATIENT
Start: 2020-06-25 | End: 2020-06-25 | Stop reason: SDUPTHER

## 2020-06-25 RX ORDER — MORPHINE SULFATE 4 MG/ML
2 INJECTION, SOLUTION INTRAMUSCULAR; INTRAVENOUS EVERY 4 HOURS PRN
Status: DISCONTINUED | OUTPATIENT
Start: 2020-06-25 | End: 2020-07-03 | Stop reason: HOSPADM

## 2020-06-25 RX ORDER — LEVOFLOXACIN 5 MG/ML
500 INJECTION, SOLUTION INTRAVENOUS EVERY 24 HOURS
Status: DISCONTINUED | OUTPATIENT
Start: 2020-06-25 | End: 2020-06-25 | Stop reason: SDUPTHER

## 2020-06-25 RX ORDER — SODIUM CHLORIDE 0.9 % (FLUSH) 0.9 %
10 SYRINGE (ML) INJECTION EVERY 12 HOURS SCHEDULED
Status: DISCONTINUED | OUTPATIENT
Start: 2020-06-25 | End: 2020-07-03 | Stop reason: HOSPADM

## 2020-06-25 RX ORDER — GABAPENTIN 400 MG/1
800 CAPSULE ORAL 2 TIMES DAILY
Status: DISCONTINUED | OUTPATIENT
Start: 2020-06-25 | End: 2020-06-25 | Stop reason: DRUGHIGH

## 2020-06-25 RX ORDER — SODIUM CHLORIDE 0.9 % (FLUSH) 0.9 %
10 SYRINGE (ML) INJECTION PRN
Status: DISCONTINUED | OUTPATIENT
Start: 2020-06-25 | End: 2020-06-25 | Stop reason: SDUPTHER

## 2020-06-25 RX ORDER — LISINOPRIL 10 MG/1
10 TABLET ORAL DAILY
Status: DISCONTINUED | OUTPATIENT
Start: 2020-06-25 | End: 2020-06-28

## 2020-06-25 RX ORDER — GABAPENTIN 400 MG/1
400 CAPSULE ORAL 2 TIMES DAILY
Status: DISCONTINUED | OUTPATIENT
Start: 2020-06-25 | End: 2020-06-25

## 2020-06-25 RX ORDER — ONDANSETRON 2 MG/ML
4 INJECTION INTRAMUSCULAR; INTRAVENOUS EVERY 6 HOURS PRN
Status: DISCONTINUED | OUTPATIENT
Start: 2020-06-25 | End: 2020-07-03 | Stop reason: HOSPADM

## 2020-06-25 RX ORDER — LAMOTRIGINE 25 MG/1
25 TABLET ORAL DAILY
Status: DISCONTINUED | OUTPATIENT
Start: 2020-06-25 | End: 2020-07-03 | Stop reason: HOSPADM

## 2020-06-25 RX ORDER — LEVOFLOXACIN 5 MG/ML
500 INJECTION, SOLUTION INTRAVENOUS ONCE
Status: COMPLETED | OUTPATIENT
Start: 2020-06-25 | End: 2020-06-25

## 2020-06-25 RX ORDER — IPRATROPIUM BROMIDE AND ALBUTEROL SULFATE 2.5; .5 MG/3ML; MG/3ML
1 SOLUTION RESPIRATORY (INHALATION)
Status: DISCONTINUED | OUTPATIENT
Start: 2020-06-25 | End: 2020-07-03 | Stop reason: HOSPADM

## 2020-06-25 RX ORDER — POLYETHYLENE GLYCOL 3350 17 G/17G
17 POWDER, FOR SOLUTION ORAL DAILY PRN
Status: DISCONTINUED | OUTPATIENT
Start: 2020-06-25 | End: 2020-07-03 | Stop reason: HOSPADM

## 2020-06-25 RX ORDER — LEVOTHYROXINE SODIUM 0.05 MG/1
50 TABLET ORAL DAILY
COMMUNITY

## 2020-06-25 RX ORDER — ASPIRIN 81 MG/1
81 TABLET, CHEWABLE ORAL DAILY
Status: DISCONTINUED | OUTPATIENT
Start: 2020-06-25 | End: 2020-07-03 | Stop reason: HOSPADM

## 2020-06-25 RX ADMIN — LEVOFLOXACIN 500 MG: 5 INJECTION, SOLUTION INTRAVENOUS at 11:43

## 2020-06-25 RX ADMIN — PIPERACILLIN SODIUM AND TAZOBACTAM SODIUM 3.38 G: 3; .375 INJECTION, POWDER, LYOPHILIZED, FOR SOLUTION INTRAVENOUS at 17:05

## 2020-06-25 RX ADMIN — ACETAMINOPHEN 650 MG: 325 TABLET, FILM COATED ORAL at 14:08

## 2020-06-25 RX ADMIN — SODIUM CHLORIDE, PRESERVATIVE FREE 10 ML: 5 INJECTION INTRAVENOUS at 20:04

## 2020-06-25 RX ADMIN — VANCOMYCIN HYDROCHLORIDE 1250 MG: 10 INJECTION, POWDER, LYOPHILIZED, FOR SOLUTION INTRAVENOUS at 16:30

## 2020-06-25 RX ADMIN — IOPAMIDOL 90 ML: 755 INJECTION, SOLUTION INTRAVENOUS at 16:17

## 2020-06-25 RX ADMIN — LAMOTRIGINE 25 MG: 25 TABLET ORAL at 08:13

## 2020-06-25 RX ADMIN — ACETAMINOPHEN 650 MG: 650 SUPPOSITORY RECTAL at 06:47

## 2020-06-25 RX ADMIN — LISINOPRIL 10 MG: 10 TABLET ORAL at 08:13

## 2020-06-25 RX ADMIN — ACETAMINOPHEN 650 MG: 325 TABLET, FILM COATED ORAL at 20:08

## 2020-06-25 RX ADMIN — MORPHINE SULFATE 1 MG: 4 INJECTION INTRAVENOUS at 20:22

## 2020-06-25 RX ADMIN — LEVOTHYROXINE SODIUM 50 MCG: 50 TABLET ORAL at 08:23

## 2020-06-25 RX ADMIN — GABAPENTIN 300 MG: 300 CAPSULE ORAL at 21:00

## 2020-06-25 RX ADMIN — SODIUM CHLORIDE, PRESERVATIVE FREE 1 G: 5 INJECTION INTRAVENOUS at 10:30

## 2020-06-25 RX ADMIN — DOPAMINE HYDROCHLORIDE 2.5 MCG/KG/MIN: 160 INJECTION, SOLUTION INTRAVENOUS at 11:39

## 2020-06-25 RX ADMIN — GABAPENTIN 800 MG: 400 CAPSULE ORAL at 08:14

## 2020-06-25 RX ADMIN — SODIUM CHLORIDE, PRESERVATIVE FREE 10 ML: 5 INJECTION INTRAVENOUS at 08:15

## 2020-06-25 RX ADMIN — IPRATROPIUM BROMIDE AND ALBUTEROL SULFATE 1 AMPULE: .5; 3 SOLUTION RESPIRATORY (INHALATION) at 18:29

## 2020-06-25 RX ADMIN — ASPIRIN 81 MG 81 MG: 81 TABLET ORAL at 08:14

## 2020-06-25 RX ADMIN — ATORVASTATIN CALCIUM 40 MG: 40 TABLET, FILM COATED ORAL at 20:08

## 2020-06-25 ASSESSMENT — PAIN DESCRIPTION - LOCATION
LOCATION: BACK;HIP
LOCATION: BACK

## 2020-06-25 ASSESSMENT — PAIN SCALES - GENERAL
PAINLEVEL_OUTOF10: 0
PAINLEVEL_OUTOF10: 8
PAINLEVEL_OUTOF10: 3
PAINLEVEL_OUTOF10: 0
PAINLEVEL_OUTOF10: 0

## 2020-06-25 ASSESSMENT — ENCOUNTER SYMPTOMS
SHORTNESS OF BREATH: 0
COUGH: 0

## 2020-06-25 ASSESSMENT — PAIN DESCRIPTION - ORIENTATION: ORIENTATION: RIGHT;LEFT;LOWER

## 2020-06-25 ASSESSMENT — PAIN DESCRIPTION - PAIN TYPE: TYPE: CHRONIC PAIN

## 2020-06-25 ASSESSMENT — PAIN DESCRIPTION - DESCRIPTORS
DESCRIPTORS: ACHING
DESCRIPTORS: ACHING

## 2020-06-25 NOTE — PROGRESS NOTES
[facility])ists      Patient:  Terril Cranker  YOB: 1940  Date of Service: 6/25/2020  MRN: [id number]   Acct: [de-identified]   Primary Care Physician: No primary care provider on file. Advance Directive: Full Code  Admit Date: 6/25/2020       Hospital Day: 0    Chief Complaint: No chief complaint on file. Mr. Terril Cranker is a [de-identified]year old  american gentleman. He was referred to us from the [facility] ED by the EP Dr. Madison Mcfadden who also happens to be his PCP. He states that the patient has no history of bradycardia. The patient was on the way back home from being seen in the PCP's office for a routine checkup. The patient and his wife stopped at the store on the way home, and he had waited in the car. When his wife came back he was unarousable. He was seen and assessed at the ED, and was found to have a heart rate of 34-42bpm. He was referred for cardiac evaluation. [address] ER covid test results negative. Subjective: pt seen and examined; confuse, heart rate 40-45    Objective:   VITALS:  BP (!) 188/67   Pulse 66   Temp 101.8 °F (38.8 °C) (Temporal)   Resp 23   Ht 6' (1.829 m)   Wt 186 lb 4 oz (84.5 kg)   SpO2 95%   BMI 25.26 kg/m²   24HR INTAKE/OUTPUT:      Intake/Output Summary (Last 24 hours) at 6/25/2020 0746  Last data filed at 6/25/2020 0600  Gross per 24 hour   Intake 0 ml   Output 400 ml   Net -400 ml     Constitutional: pleasantly confused x 3  HENT:    Head: Normocephalic and atraumatic. Mouth/Throat: Oropharynx is clear and moist. No oropharyngeal exudate. Eyes: Conjunctivae and EOM are normal. Pupils are equal, round, and reactive to light. No scleral icterus. Neck: Normal range of motion. Neck supple. No JVD present. No tracheal deviation present. No thyromegaly present. Cardiovascular: s1s2 sarah     Pulmonary/Chest: Effort normal and breath sounds normal. No stridor. No respiratory distress. No wheezes. No rales. Abdominal: Soft. Bowel sounds are normal. No distension and no mass. There is no tenderness. There is no rebound and no guarding. Musculoskeletal: Normal range of motion. There is no edema or tenderness. Extremities: No edema  Neurological: lethargic, confused  Skin: Skin is warm and dry. No rash noted. Not diaphoretic. No erythema. No pallor. Psychiatric: Normal mood and affect. Behavior is normal. Judgment and thought content normal.     Medications:      DOBUTamine        sodium chloride flush  10 mL Intravenous 2 times per day    atorvastatin  40 mg Oral Nightly    aspirin  81 mg Oral Daily    lisinopril  10 mg Oral Daily    lamoTRIgine  25 mg Oral Daily    levothyroxine  50 mcg Oral Daily    gabapentin  800 mg Oral BID    sodium chloride flush  10 mL Intravenous 2 times per day     sodium chloride flush, acetaminophen **OR** acetaminophen, polyethylene glycol, melatonin, ondansetron **OR** ondansetron, nitroGLYCERIN, sodium chloride flush  DIET CLEAR LIQUID; No Caffeine         Lab and other Data:     Recent Labs     06/25/20 0255   WBC 3.5*   HGB 13.9*   PLT 80*     Recent Labs     06/25/20 0255 06/25/20  0504     --    K 4.1 4.0     --    CO2 21*  --    BUN 15  --    CREATININE 1.7*  --    GLUCOSE 96  --      Recent Labs     06/25/20 0255   AST 49*   ALT 22   BILITOT 0.7   ALKPHOS 79     Troponin T:   Recent Labs     06/25/20 0255 06/25/20  0527   TROPONINI 0.04* 0.03     Pro-BNP: No results for input(s): BNP in the last 72 hours. INR:   Recent Labs     06/25/20 0255   INR 1.24*     ABGs:   Lab Results   Component Value Date    PHART 7.460 06/25/2020    PO2ART 65.0 06/25/2020    MMG8GPW 25.0 06/25/2020     UA:No results for input(s): NITRITE, COLORU, PHUR, LABCAST, WBCUA, RBCUA, MUCUS, TRICHOMONAS, YEAST, BACTERIA, CLARITYU, SPECGRAV, LEUKOCYTESUR, UROBILINOGEN, BILIRUBINUR, BLOODU, GLUCOSEU, AMORPHOUS in the last 72 hours.     Invalid input(s): KETONESU    Imaging:   No orders to display Micro: No results found for: BC, No components found for: LABURINE  Patient Active Problem List    Diagnosis Date Noted    Symptomatic bradycardia 06/25/2020       Assessment/plan:    Active Problems:    Symptomatic bradycardia    fever      Plan:   Symptomatic Bradycardia with NEW RBB and Left Fascicular Blocks - ACS R/O:  Dobutamine GGT for HR>50bpm  Tele  Cardio consult in AM  Trend TnI for 3 more Q3h apart  Will get echo and CXR    FEVER unknown source  covid testing negative at [facility] get blood cx, urine cx,  Will get LA  Will place on empiric abx and     DVT Prophylaxis: heparin      Janneth Bell MD  Hospitalist Service  6/25/2020  7:46 AM

## 2020-06-25 NOTE — H&P
Patient Information:  Patient: Terril Cranker  MRN: [id number]   Acct: [de-identified]  YOB: 1940  Admit Date: 6/25/2020      Date of Service: 6/25/2020  Primary Care Physician: No primary care provider on file. Advance Directive: Full Code  Health Care Proxy: his wife, Mrs. Isacc Garcia:    No chief complaint on file. Referring [de-identified] Sign Out: Dr. Derek Soriano at [facility] ED  Patient has had a heart rate of 30s-40s. Heart rate was up to 87 with agitation. Patient is on Lamictal and Levothyroxine and has Hx of Gabbapentin  Started yesterday after when Dr. Derek Soriano saw him in the office for a physical. He had stopped at the store with his wife on the way home, he waited in the car when she went in. When she came out he was unarousbale. His labs were all WNL, except for Cr at 1.7 which is near his baseline. WBC ~6  Hb ~13  TnI negative  Lactic Acid 1.5  Alcohol was negative, he is not a drinker  Urinalysis was negative  CT Head and Chest looked okay  RBBB new, Lefthemi block new    HPI:  Mr. Terril Cranker is a [de-identified]year old  american gentleman. He was referred to us from the [facility] ED by the EP Dr. Madison Mcfdaden who also happens to be his PCP. He states that the patient has no history of bradycardia. The patient was on the way back home from being seen in the PCP's office for a routine checkup. The patient and his wife stopped at the store on the way home, and he had waited in the car. When his wife came back he was unarousable. He was seen and assessed at the ED, and was found to have a heart rate of 34-42bpm. He was referred for cardiac evaluation. Review of Systems:   Review of Systems   Constitutional: Negative for chills, diaphoresis, fatigue and fever. HENT: Negative for sneezing. Respiratory: Negative for cough and shortness of breath. Cardiovascular: Negative for chest pain. Neurological: Positive for tremors and weakness.  Negative for light-headedness. Psychiatric/Behavioral: The patient is nervous/anxious.       Past Medical History:   Diagnosis Date    Hypertension     Sleep apnea     Thyroid disease     Trigeminal neuropathy      Past Surgical History:   Procedure Laterality Date    CHOLECYSTECTOMY      HERNIA REPAIR       Social History     Tobacco History     Smoking Status  Former Smoker Smoking Tobacco Type  Cigarettes    Smokeless Tobacco Use  Never Used          Alcohol History     Alcohol Use Status  Not Asked          Drug Use     Drug Use Status  Never          Sexual Activity     Sexually Active  Not Currently Partners  Male              Family History:  Deferred    Allergies:  No Known Allergies    Current Facility-Administered Medications   Medication Dose Route Frequency Provider Last Rate Last Dose    DOBUTamine (DOBUTREX) 500 mg in dextrose 5 % 250 mL infusion  2.5 mcg/kg/min Intravenous Continuous Anita Lam MD        sodium chloride flush 0.9 % injection 10 mL  10 mL Intravenous 2 times per day Anita Lam MD        sodium chloride flush 0.9 % injection 10 mL  10 mL Intravenous PRN Anita Lam MD        acetaminophen (TYLENOL) tablet 650 mg  650 mg Oral Q6H PRN Anita Lam MD        Or    acetaminophen (TYLENOL) suppository 650 mg  650 mg Rectal Q6H PRN Anita Lam MD        atorvastatin (LIPITOR) tablet 40 mg  40 mg Oral Nightly Anita Lam MD        [START ON 6/26/2020] aspirin chewable tablet 81 mg  81 mg Oral Daily Anita Lam MD        polyethylene glycol [facility]) packet 17 g  17 g Oral Daily PRN Anita Lam MD        melatonin tablet 3 mg  3 mg Oral Nightly PRN Anita Lam MD        ondansetron (ZOFRAN-ODT) disintegrating tablet 4 mg  4 mg Oral Q8H PRN Anita Lam MD        Or    ondansetron [facility]) injection 4 mg  4 mg Intravenous Q6H PRN Anita Lam MD        nitroGLYCERIN (NITROSTAT) SL tablet 0.4 mg  0.4 mg Sublingual Q5 Min PRN MD Margot Hall lisinopril (PRINIVIL;ZESTRIL) tablet 10 mg  10 mg Oral Daily Josue Alcantar MD        lamoTRIgine (LAMICTAL) tablet 25 mg  25 mg Oral Daily Josue Alcantar MD        levothyroxine (SYNTHROID) tablet 50 mcg  50 mcg Oral Daily Josue Alcantar MD        gabapentin (NEURONTIN) capsule 800 mg  800 mg Oral BID Josue Alcantar MD         Home Medications:  Prior to Admission medications    Medication Sig Start Date End Date Taking? Authorizing Provider   levothyroxine (SYNTHROID) 50 MCG tablet Take 50 mcg by mouth Daily   Yes Historical Provider, MD   gabapentin (NEURONTIN) 300 MG capsule Take 800 mg by mouth 2 times daily. Yes Historical Provider, MD   lamoTRIgine (LAMICTAL) 25 MG tablet Take 25 mg by mouth daily   Yes Historical Provider, MD   benazepril (LOTENSIN) 10 MG tablet Take 10 mg by mouth 2 times daily   Yes Historical Provider, MD   HYDROcodone-acetaminophen (NORCO) 5-325 MG per tablet Take 1 tablet by mouth every 6 hours as needed for Pain. Historical Provider, MD   DOXYCYCLINE PO Take by mouth    Historical Provider, MD         OBJECTIVE:    Vitals:    06/25/20 0226   Pulse: 59   breathing on room air    Pulse 59   Ht 6' (1.829 m)   Wt 186 lb 4 oz (84.5 kg)   BMI 25.26 kg/m²     No intake or output data in the 24 hours ending 06/25/20 0403    Physical Exam  Vitals signs reviewed. Constitutional:       General: He is not in acute distress. Appearance: He is not ill-appearing or toxic-appearing. HENT:      Head: Normocephalic and atraumatic. Nose: No congestion or rhinorrhea. Eyes:      General:         Right eye: No discharge. Left eye: No discharge. Cardiovascular:      Rate and Rhythm: Normal rate and regular rhythm. Heart sounds: No murmur. No friction rub. No gallop. Pulmonary:      Effort: No respiratory distress. Breath sounds: No stridor. No wheezing, rhonchi or rales. Chest:      Chest wall: No tenderness.    Abdominal:      General: Bowel sounds are normal.      Tenderness: There is no abdominal tenderness. There is no guarding or rebound. Skin:     General: Skin is warm. Comments: nondiaphoretic   Psychiatric:         Mood and Affect: Mood normal.         Behavior: Behavior normal.         LABORATORY DATA:    CBC:   Recent Labs     06/25/20 0255   WBC 3.5*   HGB 13.9*   HCT 42.5   PLT 80*     BMP:   Recent Labs     06/25/20 0255      K 4.1      CO2 21*   BUN 15   CREATININE 1.7*   CALCIUM 8.8   PHOS 2.0*     Hepatic Profile:   Recent Labs     06/25/20 0255   AST 49*   ALT 22   BILITOT 0.7   ALKPHOS 79     Coag Panel:   Recent Labs     06/25/20 0255   INR 1.24*   PROTIME 15.6*   APTT 34.2     Cardiac Enzymes:   Recent Labs     06/25/20 0255   TROPONINI 0.04*     Pro-BNP:   Recent Labs     06/25/20 0255   PROBNP 3,287*     A1C: No results for input(s): LABA1C in the last 72 hours.     TSH: Invalid input(s): LABTSH      Urinalysis:   Lab Results   Component Value Date    NITRU Negative 06/27/2019    BLOODU Negative 06/27/2019    SPECGRAV 1.023 06/27/2019    GLUCOSEU Negative 06/27/2019         ASESSMENTS & PLANS:    Patient Active Problem List   Diagnosis    Symptomatic bradycardia   Symptomatic Bradycardia with NEW RBB and Left Fascicular Blocks - ACS R/O:  Dobutamine GGT for HR>50bpm  Tele  Cardio consult in AM  Trend TnI for 3 more Q3h apart  Mag, Phos, TSH, Lipid Panel, HbA1c, CBC with Diff, CMP  CBC and BMP with Reflex for following AM  ASA as per protocol (324-325mg chewed STAT unless on 81ASA daily in which case 162mg chewed STAT if not yet given, THEN from following AM 81mg Daily.)  Statin as per protocol (High intensity Statin, if already on high intensity Stain of Simvasttain 80 continue it, if Lipitor 40+ then Lipitor 80 (if less than 40 just double so long as >=40), if Rosuvastatin 20mg+ then Rosuvastatin 40mg PO QHS (if less than 20 just double so long as >=20mg)  NG SL PRN CP  Defer on TTE to [address] Problems:  Continue current Regimen as indicated       DOBUTamine        sodium chloride flush  10 mL Intravenous 2 times per day    atorvastatin  40 mg Oral Nightly    [START ON 6/26/2020] aspirin  81 mg Oral Daily    lisinopril  10 mg Oral Daily    gabapentin  400 mg Oral BID    lamoTRIgine  25 mg Oral Daily    levothyroxine  50 mcg Oral Daily       Supoportive and Prophylactic Txx:  DVT Prophylaxis: SCDs for now as no PLT count and no COAGS -> is thrombocytopenic just SCDs  GI (PUD) Ppx: not indicated  PT consult for evalutation and Txx as indicated: indicated as soon as stable to do  DIET CLEAR LIQUID; No Caffeine  sodium chloride flush, acetaminophen **OR** acetaminophen, polyethylene glycol, melatonin, ondansetron **OR** ondansetron, nitroGLYCERIN      Critical Care time of >45 minutes  Pt seen/examined and admitted to inpatient status. Inpatient status is used for patients with an expected LOS extending past two midnights due to medical therapy and or critical care needs, otherwise patients are placed to OBServation status. Signed:  Electronically signed by Rudolph Munoz MD on 6/25/20 at 04:39 AM CDT.

## 2020-06-25 NOTE — PROGRESS NOTES
Pharmacy Note  Vancomycin Consult    Max L Rhodia Mohs is a [de-identified] y.o. male started on Vancomycin; consult received from Dr. Divya Becker to manage therapy. Also receiving the following antibiotics: Ceftriaxone, Levofloxacin. Active Problems:    Symptomatic bradycardia  Resolved Problems:    * No resolved hospital problems. *      Allergies:  Patient has no known allergies. Temp max: 101.8    Recent Labs     06/25/20  0255   BUN 15       Recent Labs     06/25/20  0255   CREATININE 1.7*       Recent Labs     06/25/20  0255   WBC 3.5*         Intake/Output Summary (Last 24 hours) at 6/25/2020 1529  Last data filed at 6/25/2020 1400  Gross per 24 hour   Intake 18.57 ml   Output 1100 ml   Net -1081.43 ml       Culture Date Source Results   06/25/20 Blood Sent   06/25/20 Urine Sent       Ht Readings from Last 1 Encounters:   06/25/20 6' (1.829 m)        Wt Readings from Last 1 Encounters:   06/25/20 186 lb 4 oz (84.5 kg)         Body mass index is 25.26 kg/m². Estimated Creatinine Clearance: 38 mL/min (A) (based on SCr of 1.7 mg/dL (H)). Assessment/Plan:  Will initiate vancomycin 1250 mg IV every 24 hours. Timing of trough level will be determined based on culture results, renal function, and clinical response. Thank you for the consult. Will continue to follow.     Electronically signed by Aleah Escalante, [address] [place] [address] on 6/25/2020 at 3:29 PM

## 2020-06-25 NOTE — PROGRESS NOTES
Pharmacy Renal Adjustment    Joseph Pollard is a [de-identified] y.o. male. Pharmacy has renally adjusted medications per protocol. Recent Labs     06/25/20  0255   BUN 15       Recent Labs     06/25/20  0255   CREATININE 1.7*       Estimated Creatinine Clearance: 38 mL/min (A) (based on SCr of 1.7 mg/dL (H)). Height:   Ht Readings from Last 1 Encounters:   06/25/20 6' (1.829 m)     Weight:  Wt Readings from Last 1 Encounters:   06/25/20 186 lb 4 oz (84.5 kg)       CKD stage: Unspecified         Baseline SCr: Unspecified    Plan: Adjust the following medications based on renal function:           Levofloxacin 500 mg IV every 24 hours adjusted to Levofloxacin 500 mg IV x 1 followed by Levofloxacin 250 mg IV daily.      Electronically signed by Kriss Angel [address] [place] [address] on 6/25/2020 at 11:02 AM

## 2020-06-25 NOTE — FLOWSHEET NOTE
06/25/20 1615   Encounter Summary   Services provided to: Patient  (in ICU)   Referral/Consult From: Nurse   Support System Family members   Length of Encounter 15 minutes   Advance Care Planning Yes   Spiritual/Holiness   Type Spiritual support   Assessment Sleeping   Intervention Active listening     Attempted to clarified patient's health care preferences and provide spiritual support. Pt just got back from the test and was asleep. No family present. Will follow up.   Electronically signed by Guillermo Sanders on 6/25/2020 at 6:53 PM

## 2020-06-25 NOTE — CONSULTS
There is no mass. Tenderness: There is no abdominal tenderness. There is no guarding or rebound. Musculoskeletal:         General: No deformity. Skin:     General: Skin is warm. Coloration: Skin is not pale. Findings: No erythema or rash. Neurological:      General: No focal deficit present. Mental Status: He is alert. Motor: No abnormal muscle tone. Coordination: Coordination normal.      Deep Tendon Reflexes: Reflexes normal.      Comments: Confused and not oriented to time, person, place           Labs:  Recent Labs     06/25/20  0255   WBC 3.5*   HGB 13.9*   PLT 80*       Recent Labs     06/25/20  0255 06/25/20  0504     --    K 4.1 4.0     --    CO2 21*  --    BUN 15  --    CREATININE 1.7*  --    LABGLOM 39*  --    MG 1.8  --    CALCIUM 8.8  --    PHOS 2.0*  --        CK, CKMB, Troponin: @LABRCNT (CKTOTAL:3, CKMB:3, TROPONINI:3)@    Last 3 BNP:          IMAGING:  Ct Head Wo Contrast    Result Date: 6/25/2020  EXAM: CT HEAD WO CONTRAST -- 6/25/2020 2:15 PM HISTORY: 79 years, Male, confusion COMPARISON: None DLP: 805 mGy cm. Automated exposure control was utilized to minimize patient radiation dose. TECHNIQUE: Unenhanced axial CT images obtained from vertex to skull base with coronal and sagittal reformats. FINDINGS: No acute intracranial hemorrhage, midline shift, mass effect or large territory cortical infarct. Confluent periventricular and subcortical white matter hypodensities, most likely due to chronic microvascular disease. Ventricles, sulci, basilar cisterns are proportionally prominent suggesting volume loss. Thinning of the ocular lenses may be postoperative or age-related. Paranasal sinuses and mastoid air cells normally aerated. External auditory canals within normal limits. Calvarium appears intact. Subcutaneous tissues within normal limits. 1. No acute intracranial hemorrhage, midline shift or mass effect.  2. Confluent periventricular and subcortical white matter hypodensities with volume loss, most commonly chronic microvascular changes. If clinical concern for acute infarct, MRI would be a more sensitive exam. Signed by Dr Alvaro Menard on 6/25/2020 4:22 PM    Xr Chest Portable    Result Date: 6/25/2020  EXAMINATION:  XR CHEST PORTABLE  6/25/2020 1:12 PM HISTORY: Shortness of breath. COMPARISON: No comparison study. FINDINGS:  There are patchy infiltrates in the perihilar regions and lung bases. There is minimal blunting of the costophrenic angles. Heart size is upper limits of normal. Thoracic aorta is atheromatous. 1. Bilateral infiltrates likely representing pulmonary edema. Pneumonia is possible. 2. There may be small pleural effusions. Heart size is upper limits of normal. Signed by Dr Birdie Maria on 6/25/2020 1:23 PM    Vl Dup Lower Extremity Venous Bilateral    Result Date: 6/25/2020  Vascular Lower Extremities DVT Study Procedure  Demographics   Patient Name     Jono Oden Age                    [de-identified]   Patient Number   [id number]       Gender                 Male   Visit Number     [id number]    Interpreting Physician Sy Wiggins MD   Date of Birth    1940   Referring Physician    Marek Kwong   Accession Number [id number]   Irene , RVT  Procedure Type of Study:   Veins:Lower Extremities DVT Study, VL LOWER EXTREMITY BILATERAL VENOUS  DUPLEX . Technical Quality:Limited visualization due to combative patient. Impression   Unable to complete exam because patient was shaking uncontrollably. Only  right common femoral vein and saphenofemoral junction were able to be  imaged. No DVT/SVT seen in these areas. The remainder of the right leg  could not be imaged and therefore could not \"rule out\" deep or superficial  venous thrombosis.    Signature   ----------------------------------------------------------------  Electronically signed by Sy Wiggins MD(Interpreting  physician) on 06/25/2020 05:28 PM ----------------------------------------------------------------  Velocities are measured in cm/s ; Diameters are measured in mm Right Lower Extremities DVT Study Measurements Right 2D Measurements +------------------------------------+----------+---------------+----------+ ! Location                            ! Visualized! Compressibility! Thrombosis! +------------------------------------+----------+---------------+----------+ ! Sapheno Femoral Junction            ! Yes       ! Yes            ! None      ! +------------------------------------+----------+---------------+----------+ ! Common Femoral                      !Yes       ! Yes            ! None      ! +------------------------------------+----------+---------------+----------+    Cta Pulmonary W Contrast    Result Date: 6/25/2020  CTA PULMONARY W CONTRAST 6/25/2020 11:30 AM HISTORY: Elevated d-dimer. Shortness of breath. Clinical finding is worrisome for pulmonary embolus Comparison: None Technique: CT evaluation of the chest was performed with intravenous contrast. 2 mm transaxial images were obtained. Maximum intensity projection reconstruction angiographic images were generated . Two-dimensional Coronal and sagittal reconstruction images of the pulmonary arteries, aorta and great vessels were also generated. .Radiation dose equals  mGy cm. AUTOMATED EXPOSURE CONTROL dose reduction technique was implemented Findings: There is image quadrant degradation related to breathing motion artifact. Pulmonary arteries opacify with iodinated contrast without intraluminal filling defects or convincing CT angiographic evidence of pulmonary embolus. The aorta is normal in caliber, without aneurysm or dissection. There is ectasia of the aortic arch with aortic calcifications. There is cardiomegaly with coronary artery calcifications. There are calcified left hilar lymph nodes. There is no mediastinal hilar lymphadenopathy.  There are small bilateral pleural effusions with lower lobe airspace opacities, suspect atelectasis. There are emphysematous changes in the upper lobes with bleb formation. Evaluation of the lung fields challenging due to motion, however no definite consolidating parenchymal infiltrates identified. Thyroid gland enlargement with nodules. Left-sided nodule measures 2.3 cm. Right-sided nodule measures 1.6 cm. There is no axillary lymph node enlargement. Limited imaging the upper abdomen demonstrate no acute abnormality. There is pectus excavatum anterior chest wall deformity. No acute osseous abnormalities observed. Impression: 1. No CT angiographic evidence of pulmonary embolus or acute aortic pathology. 2. Small bilateral pleural effusions with associated airspace opacities likely atelectasis. 3. Cardiomegaly with atherosclerotic calcifications. 4. Thyroid enlargement with nodules. 5. Pectus excavatum anterior chest wall deformity. Signed by Dr Erick Dodson on 6/25/2020 4:26 PM          Assessment and Plan: This is a [de-identified]y.o. year old male with past medical history of probable dementia, hypertension admitted with possible syncopal episode with noted sinus bradycardia, borderline troponins, elevated BNP with normal LV ejection fraction, negative CTA for PE, febrile without clear source. 1. At this time would monitor sinus bradycardia. Rates remain around 50 bpm with occasional junctional escape beats. Hold AV jeana agents. Can use dopamine at low dose if needed. Blood pressure is elevated however and would avoid at this time. 2. Patient appears not oriented to time, place or person. Unclear what his baseline status is. Continue IV antibiotic therapy and monitor over the next 24-48 hours.       Electronically signed by Eber Nieves MD on 6/25/2020 at 6:44 PM

## 2020-06-25 NOTE — PROGRESS NOTES
Results for Theodore Lazcano (MRN [id number]) as of 6/25/2020 05:05   Ref.  Range 6/25/2020 05:04   Hemoglobin, Art, Extended Latest Ref Range: 14.0 - 18.0 g/dL 13.6 (L)   pH, Arterial Latest Ref Range: 7.350 - 7.450  7.460 (H)   pCO2, Arterial Latest Ref Range: 35.0 - 45.0 mmHg 25.0 (L)   pO2, Arterial Latest Ref Range: 80.0 - 100.0 mmHg 65.0 (L)   HCO3, Arterial Latest Ref Range: 22.0 - 26.0 mmol/L 17.8 (L)   Base Excess, Arterial Latest Ref Range: -2.0 - 2.0 mmol/L -4.3 (L)   O2 Sat, Arterial Latest Ref Range: >92 % 94.4   O2 Content, Arterial Latest Ref Range: Not Established mL/dL 18.0   Methemoglobin, Arterial Latest Ref Range: <1.5 % 1.1   Carboxyhgb, Arterial Latest Ref Range: 0.0 - 5.0 % 2.0   Patient on RA  RR  +AT

## 2020-06-26 LAB
ANION GAP SERPL CALCULATED.3IONS-SCNC: 11 MMOL/L (ref 7–19)
BUN BLDV-MCNC: 15 MG/DL (ref 8–23)
CALCIUM SERPL-MCNC: 8.6 MG/DL (ref 8.8–10.2)
CHLORIDE BLD-SCNC: 101 MMOL/L (ref 98–111)
CHOLESTEROL, TOTAL: 104 MG/DL (ref 160–199)
CO2: 22 MMOL/L (ref 22–29)
CREAT SERPL-MCNC: 1.5 MG/DL (ref 0.5–1.2)
EKG P AXIS: -40 DEGREES
EKG P-R INTERVAL: 144 MS
EKG Q-T INTERVAL: 418 MS
EKG QRS DURATION: 138 MS
EKG QTC CALCULATION (BAZETT): 437 MS
EKG T AXIS: -34 DEGREES
GFR NON-AFRICAN AMERICAN: 45
GLUCOSE BLD-MCNC: 94 MG/DL (ref 74–109)
HCT VFR BLD CALC: 37.6 % (ref 42–52)
HDLC SERPL-MCNC: 38 MG/DL (ref 55–121)
HEMOGLOBIN: 12.9 G/DL (ref 14–18)
L. PNEUMOPHILA SEROGP 1 UR AG: NORMAL
LDL CHOLESTEROL CALCULATED: 50 MG/DL
MCH RBC QN AUTO: 31.7 PG (ref 27–31)
MCHC RBC AUTO-ENTMCNC: 34.3 G/DL (ref 33–37)
MCV RBC AUTO: 92.4 FL (ref 80–94)
PDW BLD-RTO: 12.7 % (ref 11.5–14.5)
PLATELET # BLD: 65 K/UL (ref 130–400)
PMV BLD AUTO: 12.9 FL (ref 9.4–12.4)
POTASSIUM REFLEX MAGNESIUM: 4.1 MMOL/L (ref 3.5–5)
RBC # BLD: 4.07 M/UL (ref 4.7–6.1)
SODIUM BLD-SCNC: 134 MMOL/L (ref 136–145)
STREP PNEUMONIAE ANTIGEN, URINE: NORMAL
TRIGL SERPL-MCNC: 81 MG/DL (ref 0–149)
TROPONIN: 0.02 NG/ML (ref 0–0.03)
WBC # BLD: 3.2 K/UL (ref 4.8–10.8)

## 2020-06-26 PROCEDURE — 87449 NOS EACH ORGANISM AG IA: CPT

## 2020-06-26 PROCEDURE — 6360000002 HC RX W HCPCS: Performed by: HOSPITALIST

## 2020-06-26 PROCEDURE — 80061 LIPID PANEL: CPT

## 2020-06-26 PROCEDURE — 2500000003 HC RX 250 WO HCPCS: Performed by: INTERNAL MEDICINE

## 2020-06-26 PROCEDURE — 36415 COLL VENOUS BLD VENIPUNCTURE: CPT

## 2020-06-26 PROCEDURE — 6360000002 HC RX W HCPCS: Performed by: INTERNAL MEDICINE

## 2020-06-26 PROCEDURE — 85027 COMPLETE CBC AUTOMATED: CPT

## 2020-06-26 PROCEDURE — 99232 SBSQ HOSP IP/OBS MODERATE 35: CPT | Performed by: INTERNAL MEDICINE

## 2020-06-26 PROCEDURE — 6370000000 HC RX 637 (ALT 250 FOR IP): Performed by: HOSPITALIST

## 2020-06-26 PROCEDURE — 93010 ELECTROCARDIOGRAM REPORT: CPT | Performed by: INTERNAL MEDICINE

## 2020-06-26 PROCEDURE — 2000000000 HC ICU R&B

## 2020-06-26 PROCEDURE — 84484 ASSAY OF TROPONIN QUANT: CPT

## 2020-06-26 PROCEDURE — 80048 BASIC METABOLIC PNL TOTAL CA: CPT

## 2020-06-26 PROCEDURE — 2580000003 HC RX 258: Performed by: INTERNAL MEDICINE

## 2020-06-26 PROCEDURE — 94640 AIRWAY INHALATION TREATMENT: CPT

## 2020-06-26 PROCEDURE — 2580000003 HC RX 258: Performed by: HOSPITALIST

## 2020-06-26 PROCEDURE — 93005 ELECTROCARDIOGRAM TRACING: CPT | Performed by: HOSPITALIST

## 2020-06-26 RX ORDER — MEPERIDINE HYDROCHLORIDE 50 MG/ML
6.25 INJECTION INTRAMUSCULAR; INTRAVENOUS; SUBCUTANEOUS EVERY 4 HOURS PRN
Status: DISCONTINUED | OUTPATIENT
Start: 2020-06-26 | End: 2020-06-29

## 2020-06-26 RX ADMIN — MORPHINE SULFATE 1 MG: 4 INJECTION INTRAVENOUS at 02:11

## 2020-06-26 RX ADMIN — ACETAMINOPHEN 650 MG: 325 TABLET, FILM COATED ORAL at 22:05

## 2020-06-26 RX ADMIN — GABAPENTIN 300 MG: 300 CAPSULE ORAL at 09:30

## 2020-06-26 RX ADMIN — ACETAMINOPHEN 650 MG: 325 TABLET, FILM COATED ORAL at 09:49

## 2020-06-26 RX ADMIN — PIPERACILLIN SODIUM AND TAZOBACTAM SODIUM 3.38 G: 3; .375 INJECTION, POWDER, LYOPHILIZED, FOR SOLUTION INTRAVENOUS at 01:06

## 2020-06-26 RX ADMIN — LAMOTRIGINE 25 MG: 25 TABLET ORAL at 09:30

## 2020-06-26 RX ADMIN — IPRATROPIUM BROMIDE AND ALBUTEROL SULFATE 1 AMPULE: .5; 3 SOLUTION RESPIRATORY (INHALATION) at 22:11

## 2020-06-26 RX ADMIN — PIPERACILLIN SODIUM AND TAZOBACTAM SODIUM 3.38 G: 3; .375 INJECTION, POWDER, LYOPHILIZED, FOR SOLUTION INTRAVENOUS at 17:00

## 2020-06-26 RX ADMIN — HYDRALAZINE HYDROCHLORIDE 10 MG: 20 INJECTION INTRAMUSCULAR; INTRAVENOUS at 01:33

## 2020-06-26 RX ADMIN — MEPERIDINE HYDROCHLORIDE 6.5 MG: 50 INJECTION, SOLUTION INTRAMUSCULAR; INTRAVENOUS; SUBCUTANEOUS at 20:23

## 2020-06-26 RX ADMIN — LISINOPRIL 10 MG: 10 TABLET ORAL at 09:49

## 2020-06-26 RX ADMIN — ASPIRIN 81 MG 81 MG: 81 TABLET ORAL at 09:49

## 2020-06-26 RX ADMIN — IPRATROPIUM BROMIDE AND ALBUTEROL SULFATE 1 AMPULE: .5; 3 SOLUTION RESPIRATORY (INHALATION) at 11:00

## 2020-06-26 RX ADMIN — LEVOFLOXACIN 250 MG: 5 INJECTION, SOLUTION INTRAVENOUS at 11:00

## 2020-06-26 RX ADMIN — HYDRALAZINE HYDROCHLORIDE 10 MG: 20 INJECTION INTRAMUSCULAR; INTRAVENOUS at 10:16

## 2020-06-26 RX ADMIN — IPRATROPIUM BROMIDE AND ALBUTEROL SULFATE 1 AMPULE: .5; 3 SOLUTION RESPIRATORY (INHALATION) at 06:58

## 2020-06-26 RX ADMIN — SODIUM CHLORIDE, PRESERVATIVE FREE 10 ML: 5 INJECTION INTRAVENOUS at 22:09

## 2020-06-26 RX ADMIN — PIPERACILLIN SODIUM AND TAZOBACTAM SODIUM 3.38 G: 3; .375 INJECTION, POWDER, LYOPHILIZED, FOR SOLUTION INTRAVENOUS at 09:30

## 2020-06-26 RX ADMIN — LEVOTHYROXINE SODIUM 50 MCG: 50 TABLET ORAL at 06:05

## 2020-06-26 RX ADMIN — GABAPENTIN 300 MG: 300 CAPSULE ORAL at 20:30

## 2020-06-26 RX ADMIN — GABAPENTIN 300 MG: 300 CAPSULE ORAL at 14:02

## 2020-06-26 RX ADMIN — VANCOMYCIN HYDROCHLORIDE 1250 MG: 10 INJECTION, POWDER, LYOPHILIZED, FOR SOLUTION INTRAVENOUS at 15:53

## 2020-06-26 RX ADMIN — ACETAMINOPHEN 650 MG: 325 TABLET, FILM COATED ORAL at 15:53

## 2020-06-26 RX ADMIN — MORPHINE SULFATE: 4 INJECTION INTRAVENOUS at 15:53

## 2020-06-26 RX ADMIN — DOXYCYCLINE 100 MG: 100 INJECTION, POWDER, LYOPHILIZED, FOR SOLUTION INTRAVENOUS at 18:19

## 2020-06-26 RX ADMIN — IPRATROPIUM BROMIDE AND ALBUTEROL SULFATE 1 AMPULE: .5; 3 SOLUTION RESPIRATORY (INHALATION) at 16:02

## 2020-06-26 RX ADMIN — ACETAMINOPHEN 650 MG: 325 TABLET, FILM COATED ORAL at 02:12

## 2020-06-26 ASSESSMENT — PAIN SCALES - GENERAL
PAINLEVEL_OUTOF10: 9
PAINLEVEL_OUTOF10: 0
PAINLEVEL_OUTOF10: 7
PAINLEVEL_OUTOF10: 0

## 2020-06-26 ASSESSMENT — PAIN DESCRIPTION - PAIN TYPE
TYPE: CHRONIC PAIN
TYPE: CHRONIC PAIN

## 2020-06-26 ASSESSMENT — PAIN DESCRIPTION - DESCRIPTORS
DESCRIPTORS: ACHING
DESCRIPTORS: ACHING

## 2020-06-26 ASSESSMENT — PAIN DESCRIPTION - LOCATION
LOCATION: BACK
LOCATION: BACK

## 2020-06-26 NOTE — PROGRESS NOTES
[facility]      Patient:  Lencho Atkins  YOB: 1940  Date of Service: 6/26/2020  MRN: [id number]   Acct: [de-identified]   Primary Care Physician: No primary care provider on file. Advance Directive: Full Code  Admit Date: 6/25/2020       Hospital Day: 1    Chief Complaint: No chief complaint on file. Mr. Lencho Atkins is a [de-identified]year old  american gentleman. He was referred to us from the [facility] ED by the EP Dr. Gina Reynolds who also happens to be his PCP. He states that the patient has no history of bradycardia. The patient was on the way back home from being seen in the PCP's office for a routine checkup. The patient and his wife stopped at the store on the way home, and he had waited in the car. When his wife came back he was unarousable. He was seen and assessed at the ED, and was found to have a heart rate of 34-42bpm. He was referred for cardiac evaluation. [address] ER covid test results negative. Subjective: pt seen and examined; more alert today and less confusion. Still spiking fever. Objective:   VITALS:  BP (!) 152/62   Pulse 64   Temp 100.5 °F (38.1 °C) (Temporal)   Resp 14   Ht 6' (1.829 m)   Wt 179 lb 12.8 oz (81.6 kg)   SpO2 93%   BMI 24.39 kg/m²   24HR INTAKE/OUTPUT:      Intake/Output Summary (Last 24 hours) at 6/26/2020 9988  Last data filed at 6/26/2020 0600  Gross per 24 hour   Intake 772.93 ml   Output 1505 ml   Net -732.07 ml     Constitutional: pleasantly confused x 1  HENT:    Head: Normocephalic and atraumatic. Mouth/Throat: Oropharynx is clear and moist. No oropharyngeal exudate. Eyes: Conjunctivae and EOM are normal. Pupils are equal, round, and reactive to light. No scleral icterus. Neck: Normal range of motion. Neck supple. No JVD present. No tracheal deviation present. No thyromegaly present. Cardiovascular: s1s2 sarah     Pulmonary/Chest: Effort normal and breath sounds normal. No stridor. No respiratory distress. Results   Component Value Date    PHART 7.460 06/25/2020    PO2ART 65.0 06/25/2020    LKM5RNU 25.0 06/25/2020     UA:  Recent Labs     06/25/20  1655   COLORU YELLOW   PHUR 6.0   WBCUA 1   RBCUA 37*   BACTERIA NEGATIVE*   CLARITYU Clear   SPECGRAV 1.030   LEUKOCYTESUR Negative   UROBILINOGEN 1.0   BILIRUBINUR Negative   BLOODU LARGE*   GLUCOSEU Negative       Imaging:   CT HEAD WO CONTRAST   Final Result   1. No acute intracranial hemorrhage, midline shift or mass effect. 2. Confluent periventricular and subcortical white matter   hypodensities with volume loss, most commonly chronic microvascular   changes. If clinical concern for acute infarct, MRI would be a more   sensitive exam.   Signed by Dr Gabriel Ng on 6/25/2020 4:22 PM      CTA PULMONARY W CONTRAST   Final Result   Impression:   1. No CT angiographic evidence of pulmonary embolus or acute aortic   pathology. 2. Small bilateral pleural effusions with associated airspace   opacities likely atelectasis. 3. Cardiomegaly with atherosclerotic calcifications. 4. Thyroid enlargement with nodules. 5. Pectus excavatum anterior chest wall deformity. Signed by Dr Alyce Kuhn on 6/25/2020 4:26 PM      VL DUP LOWER EXTREMITY VENOUS BILATERAL   Final Result      XR CHEST PORTABLE   Final Result   1. Bilateral infiltrates likely representing pulmonary edema. Pneumonia is possible. 2. There may be small pleural effusions. Heart size is upper limits of   normal.   Signed by Dr Wenceslao Alvarez on 6/25/2020 1:23 PM        Micro: No results found for: BC, No components found for: LABURINE  Patient Active Problem List    Diagnosis Date Noted    Symptomatic bradycardia 06/25/2020       Assessment/plan:    Active Problems:    Symptomatic bradycardia    fever      Plan:   Symptomatic Bradycardia with NEW RBB and Left Fascicular Blocks - ACS R/O:  Dobutamine GGT for HR>50bpm  Tele  Cardio on board  Trend TnI for 3 more Q3h apart   echo and CXR reviewed  -cardio

## 2020-06-26 NOTE — CONSULTS
[facility]    PAST MEDICAL AND SURGICAL HISTORY       Past Medical History:   Diagnosis Date    Hypertension     Palliative care patient 2020    Sleep apnea     Thyroid disease     Trigeminal neuropathy        Past Surgical History:   Procedure Laterality Date    CHOLECYSTECTOMY      HERNIA REPAIR           MEDICATIONS :       Scheduled Meds:   atorvastatin  40 mg Oral Nightly    aspirin  81 mg Oral Daily    lisinopril  10 mg Oral Daily    lamoTRIgine  25 mg Oral Daily    levothyroxine  50 mcg Oral Daily    sodium chloride flush  10 mL Intravenous 2 times per day    levofloxacin  250 mg Intravenous Q24H    gabapentin  300 mg Oral TID    vancomycin  1,250 mg Intravenous Q24H    vancomycin (VANCOCIN) intermittent dosing (placeholder)   Other RX Placeholder    piperacillin-tazobactam  3.375 g Intravenous Q8H    ipratropium-albuterol  1 ampule Inhalation Q4H WA     Continuous Infusions:   DOBUTamine      DOPamine Stopped (20 1705)     PRN Meds:acetaminophen **OR** acetaminophen, polyethylene glycol, melatonin, ondansetron **OR** ondansetron, nitroGLYCERIN, sodium chloride flush, hydrALAZINE, morphine, morphine, naloxone    ALLERGIES:      Patient has no known allergies. SOCIAL HISTORY:     TOBACCO:   reports that he has quit smoking. His smoking use included cigarettes. He has never used smokeless tobacco.     ETOH:   has no history on file for alcohol. Patient currently lives at home with his wife    FAMILY HISTORY:     No family history on file. REVIEW OF SYSTEMS:     See HPI-review of systems obtained from patient's wife, record review and nursing.     Otherwise unobtainable from patient    PHYSICAL EXAM:     BP (!) 141/54   Pulse 75   Temp 101.1 °F (38.4 °C) (Bladder)   Resp 19   Ht 6' (1.829 m)   Wt 179 lb 12.8 oz (81.6 kg)   SpO2 92%   BMI 24.39 kg/m²  Temp (24hrs), Av.5 °F (38.6 °C), Min:99.1 °F (37.3 °C), Max:104.7 °F (40.4 °C)    General: Visibly having Reiger in bed and appears uncomfortable. HEENT: pink conjunctiva, anicteric sclera, fairly moist oral mucosa. No lower teeth  LN: no enlarged cervical, supraclavicular LAD  Neck: No meningismus appreciated  Chest:  Lungs without crackles, ronchi or wheezing although very difficult to auscultate  Cardiovascular: Sinus rhythm on monitor  Abdomen:  Soft, non tender to palpation, BS positive  Extremities: No pretibial edema  Skin:  Warm and dry. Multiple areas of ecchymosis in various stages/senile purpura. No discrete rash specifically looked on palms soles wrists and ankles  CNS: Moves all extremities, speech was clear when I asked him his name which he clearly said \"Joseph Salazar\". He was not oriented to place but listed the city where his daughter lives according to his wife. 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   PSYCH: Mildly agitated secondary to the [place] but very cooperative with exam.  IV access: Peripheral IV antecubital right, condition patent and no redness         LABS:     CBC with DIFF:   Recent Labs     06/25/20 0255 06/26/20  0422   WBC 3.5* 3.2*   RBC 4.43* 4.07*   HGB 13.9* 12.9*   HCT 42.5 37.6*   MCV 95.9* 92.4   MCH 31.4* 31.7*   MCHC 32.7* 34.3   RDW 12.8 12.7   PLT 80* 65*   MPV 12.4 12.9*   NEUTOPHILPCT 62.6  --    LYMPHOPCT 27.0  --    MONOPCT 8.6  --    EOSRELPCT 0.3  --    BASOPCT 0.9  --    NEUTROABS 2.2  --    LYMPHSABS 0.9*  --    MONOSABS 0.30  --    EOSABS 0.00  --    BASOSABS 0.00  --        CMP/BMP:  Recent Labs     06/25/20  0255 06/25/20  0504 06/26/20  0423     --  134*   K 4.1 4.0 4.1     --  101   CO2 21*  --  22   ANIONGAP 11  --  11   GLUCOSE 96  --  94   BUN 15  --  15   CREATININE 1.7*  --  1.5*   LABGLOM 39*  --  45*   CALCIUM 8.8  --  8.6*   PROT 5.7*  --   --    LABALBU 3.6  --   --    BILITOT 0.7  --   --    ALKPHOS 79  --   --    ALT 22  --   --    AST 49*  --   -- blunting of the costophrenic angles. Heart size is upper limits of normal. Thoracic aorta is atheromatous. 1. Bilateral infiltrates likely representing pulmonary edema. Pneumonia is possible. 2. There may be small pleural effusions. Heart size is upper limits of normal. Signed by Dr Buddy Esquivel on 6/25/2020 1:23 PM    Vl Dup Lower Extremity Venous Bilateral    Result Date: 6/25/2020  Vascular Lower Extremities DVT Study Procedure  Demographics   Patient Name     Jose Osorio Age                    [de-identified]   Patient Number   [id number]       Gender                 Male   Visit Number     [id number]    Interpreting Physician Rosanne Cruz MD   Date of Birth    1940   Referring Physician    Carolyn Calderon   Accession Number [id number]   [address], RVT  Procedure Type of Study:   Veins:Lower Extremities DVT Study, VL LOWER EXTREMITY BILATERAL VENOUS  DUPLEX . Technical Quality:Limited visualization due to combative patient. Impression   Unable to complete exam because patient was shaking uncontrollably. Only  right common femoral vein and saphenofemoral junction were able to be  imaged. No DVT/SVT seen in these areas. The remainder of the right leg  could not be imaged and therefore could not \"rule out\" deep or superficial  venous thrombosis. Signature   ----------------------------------------------------------------  Electronically signed by Rosanne Cruz MD(Interpreting  physician) on 06/25/2020 05:28 PM  ----------------------------------------------------------------  Velocities are measured in cm/s ; Diameters are measured in mm Right Lower Extremities DVT Study Measurements Right 2D Measurements +------------------------------------+----------+---------------+----------+ ! Location                            ! Visualized! Compressibility! Thrombosis! +------------------------------------+----------+---------------+----------+ ! Sapheno Femoral Junction            ! Yes       ! Yes pectus excavatum anterior chest wall deformity. No acute osseous abnormalities observed. Impression: 1. No CT angiographic evidence of pulmonary embolus or acute aortic pathology. 2. Small bilateral pleural effusions with associated airspace opacities likely atelectasis. 3. Cardiomegaly with atherosclerotic calcifications. 4. Thyroid enlargement with nodules. 5. Pectus excavatum anterior chest wall deformity. Signed by Dr Kelli Arora on 6/25/2020 4:26 PM          ASSESSMENT AND PLAN     Patient Active Problem List   Diagnosis    Symptomatic bradycardia     High fever to almost 105 today with review of temperatures at [facility] reaching the highest of 100.9. Patient's wife did report shaking chills and none documented fever at home prior to admission to [facility] however. Initial concern with temperature that high would be for drug fever. Patient did not receive any antibiotics at [facility] to my review. It appears the statin is a a drug started here but I think that is unlikely to cause a problem this quickly. Bacterial infection is a possibility as well. Unfortunately we only have one blood culture drawn from [facility] however hopefully our blood cultures were obtained here prior to any antibiotics being started    CT of the chest from [facility] did not reveal any evidence of pneumonia and CT angiogram of the lungs here at [facility] and the [facility] does not either. Would be concerned about the possibility of a tick related process given the leukopenia, thrombocytopenia at this point we do have a mildly elevated AST. · We will plan to discontinue all nonessential medications  · Certainly agree with covering broadly for possibility of bacterial sepsis at this time.   · We will need to monitor hydration status and kidney function closely  · We will start doxycycline  · We will Ehrlichia PCR and [place] spotted fever IgG and IgM    Thank you Kit Francisco MD for allowing me to participate in this patient's care  Electronically signed by Jyoti Regalado MD on 6/26/2020 at 4:34 PM

## 2020-06-26 NOTE — PLAN OF CARE
Problem: Pain:  Goal: Pain level will decrease  Description: Pain level will decrease  6/26/2020 1044 by Jessa Monson RN  Outcome: Ongoing  6/26/2020 0023 by Warren Kay RN  Outcome: Ongoing     Problem: Falls - Risk of:  Goal: Will remain free from falls  Description: Will remain free from falls  6/26/2020 1044 by Jessa Monson RN  Outcome: Ongoing  6/26/2020 0023 by Warren Kay RN  Outcome: Ongoing

## 2020-06-26 NOTE — PROGRESS NOTES
50 mcg Oral Daily    sodium chloride flush  10 mL Intravenous 2 times per day    levofloxacin  250 mg Intravenous Q24H    gabapentin  300 mg Oral TID    vancomycin  1,250 mg Intravenous Q24H    vancomycin (VANCOCIN) intermittent dosing (placeholder)   Other RX Placeholder    piperacillin-tazobactam  3.375 g Intravenous Q8H    ipratropium-albuterol  1 ampule Inhalation Q4H WA       TELEMETRY: Sinus     Physical Exam:      Physical Exam  Constitutional:       Appearance: He is well-developed. HENT:      Mouth/Throat:      Pharynx: No oropharyngeal exudate. Eyes:      General: No scleral icterus. Right eye: No discharge. Left eye: No discharge. Neck:      Thyroid: No thyromegaly. Vascular: No JVD. Cardiovascular:      Rate and Rhythm: Normal rate and regular rhythm. Heart sounds: No murmur. No friction rub. No gallop. Comments: No JVD  No edema  No significant systolic or diastolic murmurs. Pulmonary:      Effort: No respiratory distress. Breath sounds: No stridor. No wheezing or rales. Abdominal:      General: Bowel sounds are normal. There is no distension. Palpations: Abdomen is soft. There is no mass. Tenderness: There is no abdominal tenderness. There is no guarding or rebound. Comments: No palpable organomegaly   Musculoskeletal:         General: No deformity. Skin:     General: Skin is warm. Coloration: Skin is not pale. Findings: No erythema or rash. Neurological:      Mental Status: He is alert and oriented to person, place, and time. Motor: No abnormal muscle tone.       Coordination: Coordination normal.      Deep Tendon Reflexes: Reflexes normal.                 Lab Data:  CBC:   Recent Labs     06/25/20 0255 06/26/20  0422   WBC 3.5* 3.2*   HGB 13.9* 12.9*   HCT 42.5 37.6*   MCV 95.9* 92.4   PLT 80* 65*     BMP:   Recent Labs     06/25/20  0255 06/25/20  0504 06/26/20  0423     --  134*   K 4.1 4.0 4.1    --  101   CO2 21*  --  22   PHOS 2.0*  --   --    BUN 15  --  15   CREATININE 1.7*  --  1.5*     LIVER PROFILE:   Recent Labs     06/25/20 0255   AST 49*   ALT 22   BILITOT 0.7   ALKPHOS 79     PT/INR:   Recent Labs     06/25/20 0255   PROTIME 15.6*   INR 1.24*     APTT:   Recent Labs     06/25/20 0255   APTT 34.2     BNP:  No results for input(s): BNP in the last 72 hours. CK, CKMB, Troponin: @LABRCNT (CKTOTAL:3, CKMB:3, TROPONINI:3)@    IMAGING:  Ct Head Wo Contrast    Result Date: 6/25/2020  EXAM: CT HEAD WO CONTRAST -- 6/25/2020 2:15 PM HISTORY: 79 years, Male, confusion COMPARISON: None DLP: 805 mGy cm. Automated exposure control was utilized to minimize patient radiation dose. TECHNIQUE: Unenhanced axial CT images obtained from vertex to skull base with coronal and sagittal reformats. FINDINGS: No acute intracranial hemorrhage, midline shift, mass effect or large territory cortical infarct. Confluent periventricular and subcortical white matter hypodensities, most likely due to chronic microvascular disease. Ventricles, sulci, basilar cisterns are proportionally prominent suggesting volume loss. Thinning of the ocular lenses may be postoperative or age-related. Paranasal sinuses and mastoid air cells normally aerated. External auditory canals within normal limits. Calvarium appears intact. Subcutaneous tissues within normal limits. 1. No acute intracranial hemorrhage, midline shift or mass effect. 2. Confluent periventricular and subcortical white matter hypodensities with volume loss, most commonly chronic microvascular changes. If clinical concern for acute infarct, MRI would be a more sensitive exam. Signed by Dr Joan Dumont on 6/25/2020 4:22 PM    Xr Chest Portable    Result Date: 6/25/2020  EXAMINATION:  XR CHEST PORTABLE  6/25/2020 1:12 PM HISTORY: Shortness of breath. COMPARISON: No comparison study. FINDINGS:  There are patchy infiltrates in the perihilar regions and lung bases.  There is minimal blunting of the costophrenic angles. Heart size is upper limits of normal. Thoracic aorta is atheromatous. 1. Bilateral infiltrates likely representing pulmonary edema. Pneumonia is possible. 2. There may be small pleural effusions. Heart size is upper limits of normal. Signed by Dr Santi Alvarenga on 6/25/2020 1:23 PM    Vl Dup Lower Extremity Venous Bilateral    Result Date: 6/25/2020  Vascular Lower Extremities DVT Study Procedure  Demographics   Patient Name     Brando Eddy Age                    [de-identified]   Patient Number   [id number]       Gender                 Male   Visit Number     [id number]    Interpreting Physician Kenya Pelletier MD   Date of Birth    1940   Referring Physician    Papa Reyes   Accession Number [id number]   Irene 70, RVT  Procedure Type of Study:   Veins:Lower Extremities DVT Study, VL LOWER EXTREMITY BILATERAL VENOUS  DUPLEX . Technical Quality:Limited visualization due to combative patient. Impression   Unable to complete exam because patient was shaking uncontrollably. Only  right common femoral vein and saphenofemoral junction were able to be  imaged. No DVT/SVT seen in these areas. The remainder of the right leg  could not be imaged and therefore could not \"rule out\" deep or superficial  venous thrombosis. Signature   ----------------------------------------------------------------  Electronically signed by Kenya Pelletier MD(Interpreting  physician) on 06/25/2020 05:28 PM  ----------------------------------------------------------------  Velocities are measured in cm/s ; Diameters are measured in mm Right Lower Extremities DVT Study Measurements Right 2D Measurements +------------------------------------+----------+---------------+----------+ ! Location                            ! Visualized! Compressibility! Thrombosis! +------------------------------------+----------+---------------+----------+ ! Sapheno Femoral Junction            ! Yes !Yes            !None      ! +------------------------------------+----------+---------------+----------+ ! Common Femoral                      !Yes       ! Yes            ! None      ! +------------------------------------+----------+---------------+----------+    Cta Pulmonary W Contrast    Result Date: 6/25/2020  CTA PULMONARY W CONTRAST 6/25/2020 11:30 AM HISTORY: Elevated d-dimer. Shortness of breath. Clinical finding is worrisome for pulmonary embolus Comparison: None Technique: CT evaluation of the chest was performed with intravenous contrast. 2 mm transaxial images were obtained. Maximum intensity projection reconstruction angiographic images were generated . Two-dimensional Coronal and sagittal reconstruction images of the pulmonary arteries, aorta and great vessels were also generated. .Radiation dose equals  mGy cm. AUTOMATED EXPOSURE CONTROL dose reduction technique was implemented Findings: There is image quadrant degradation related to breathing motion artifact. Pulmonary arteries opacify with iodinated contrast without intraluminal filling defects or convincing CT angiographic evidence of pulmonary embolus. The aorta is normal in caliber, without aneurysm or dissection. There is ectasia of the aortic arch with aortic calcifications. There is cardiomegaly with coronary artery calcifications. There are calcified left hilar lymph nodes. There is no mediastinal hilar lymphadenopathy. There are small bilateral pleural effusions with lower lobe airspace opacities, suspect atelectasis. There are emphysematous changes in the upper lobes with bleb formation. Evaluation of the lung fields challenging due to motion, however no definite consolidating parenchymal infiltrates identified. Thyroid gland enlargement with nodules. Left-sided nodule measures 2.3 cm. Right-sided nodule measures 1.6 cm. There is no axillary lymph node enlargement. Limited imaging the upper abdomen demonstrate no acute abnormality.

## 2020-06-26 NOTE — ACP (ADVANCE CARE PLANNING)
Advance Care Planning     Advance Care Planning Activator (Inpatient)  Conversation Note      Date of ACP Conversation: 6/26/20      Conversation Conducted with: Pt, Joseph Valdes and confirmed with his wife, Tamanna Guardado    ACP Activator: Pedro Hurtado      If no Authorized Analy [facility] has previously been identified, then patient chooses Health Care Decision Maker:  \"Who would you like to name as your primary health care decision-maker? \"               Name: Tamanna Guardado      Relationship: wife       Phone number: [phone]  Asaf Gutierrez this person be reached easily? \"  YES  \"Who would you like to name as your back-up decision maker? \"   Name: Partha Solis       Relationship: Daughter          Phone number: [phone]  Asaf Gutierrez this person be reached easily? \" YES        Care Preferences    Ventilation: \"If you were in your present state of health and suddenly became very ill and were unable to breathe on your own, what would your preference be about the use of a ventilator (breathing machine) if it were available to you? \"      Would the patient desire the use of ventilator (breathing machine)?:  YES    \"If your health worsens and it becomes clear that your chance of recovery is unlikely, what would your preference be about the use of a ventilator (breathing machine) if it were available to you? \"     Would the patient desire the use of ventilator (breathing machine)?: YES      Resuscitation  \"CPR works best to restart the heart when there is a sudden event, like a heart attack, in someone who is otherwise healthy. Unfortunately, CPR does not typically restart the heart for people who have serious health conditions or who are very sick. \"    \"In the event your heart stopped as a result of an underlying serious health condition, would you want attempts to be made to restart your heart (answer \"yes\" for attempt to resuscitate) or would you prefer a natural death (answer \"no\" for do not attempt to resuscitate)? \" Christophe Blake Outcomes:  [x] ACP discussion completed  [] Existing advance directive reviewed with patient; no changes to patient's previously recorded wishes  [] New Advance Directive completed  [] Portable Do Not Rescitate prepared for Provider review and signature  [] POLST/POST/MOLST/MOST prepared for Provider review and signature      Follow-up plan:    [x] Schedule follow-up conversation to continue planning

## 2020-06-27 ENCOUNTER — APPOINTMENT (OUTPATIENT)
Dept: ULTRASOUND IMAGING | Age: 80
DRG: 868 | End: 2020-06-27
Attending: HOSPITALIST
Payer: MEDICARE

## 2020-06-27 LAB
ALBUMIN SERPL-MCNC: 3.3 G/DL (ref 3.5–5.2)
ALP BLD-CCNC: 139 U/L (ref 40–130)
ALT SERPL-CCNC: 95 U/L (ref 5–41)
ANION GAP SERPL CALCULATED.3IONS-SCNC: 13 MMOL/L (ref 7–19)
ANISOCYTOSIS: ABNORMAL
AST SERPL-CCNC: 221 U/L (ref 5–40)
ATYPICAL LYMPHOCYTE RELATIVE PERCENT: 10 % (ref 0–8)
BANDED NEUTROPHILS RELATIVE PERCENT: 6 % (ref 0–5)
BASOPHILS ABSOLUTE: 0.1 K/UL (ref 0–0.2)
BASOPHILS RELATIVE PERCENT: 2 % (ref 0–1)
BILIRUB SERPL-MCNC: 2 MG/DL (ref 0.2–1.2)
BUN BLDV-MCNC: 17 MG/DL (ref 8–23)
BURR CELLS: ABNORMAL
CALCIUM SERPL-MCNC: 8.9 MG/DL (ref 8.8–10.2)
CHLORIDE BLD-SCNC: 100 MMOL/L (ref 98–111)
CO2: 21 MMOL/L (ref 22–29)
CREAT SERPL-MCNC: 1.7 MG/DL (ref 0.5–1.2)
CREATININE URINE: 105.1 MG/DL (ref 4.2–622)
EOSINOPHILS ABSOLUTE: 0 K/UL (ref 0–0.6)
EOSINOPHILS RELATIVE PERCENT: 0 % (ref 0–5)
GFR NON-AFRICAN AMERICAN: 39
GLUCOSE BLD-MCNC: 107 MG/DL (ref 74–109)
HCT VFR BLD CALC: 39.3 % (ref 42–52)
HEMOGLOBIN: 13.5 G/DL (ref 14–18)
LYMPHOCYTES ABSOLUTE: 0.9 K/UL (ref 1.1–4.5)
LYMPHOCYTES RELATIVE PERCENT: 24 % (ref 20–40)
MCH RBC QN AUTO: 31.5 PG (ref 27–31)
MCHC RBC AUTO-ENTMCNC: 34.4 G/DL (ref 33–37)
MCV RBC AUTO: 91.8 FL (ref 80–94)
MICROALBUMIN UR-MCNC: 9.8 MG/DL (ref 0–19)
MICROALBUMIN/CREAT UR-RTO: 93.2 MG/G
MONOCYTES ABSOLUTE: 0.3 K/UL (ref 0–0.9)
MONOCYTES RELATIVE PERCENT: 10 % (ref 0–10)
NEUTROPHILS ABSOLUTE: 1.5 K/UL (ref 1.5–7.5)
NEUTROPHILS RELATIVE PERCENT: 48 % (ref 50–65)
PARATHYROID HORMONE INTACT: 62.4 PG/ML (ref 15–65)
PDW BLD-RTO: 13.1 % (ref 11.5–14.5)
PLATELET # BLD: 59 K/UL (ref 130–400)
PLATELET SLIDE REVIEW: ABNORMAL
PMV BLD AUTO: 13.8 FL (ref 9.4–12.4)
POTASSIUM REFLEX MAGNESIUM: 4.1 MMOL/L (ref 3.5–5)
RBC # BLD: 4.28 M/UL (ref 4.7–6.1)
SODIUM BLD-SCNC: 134 MMOL/L (ref 136–145)
TEAR DROP CELLS: ABNORMAL
TOTAL PROTEIN: 5.3 G/DL (ref 6.6–8.7)
URINE CULTURE, ROUTINE: NORMAL
WBC # BLD: 2.7 K/UL (ref 4.8–10.8)

## 2020-06-27 PROCEDURE — 82043 UR ALBUMIN QUANTITATIVE: CPT

## 2020-06-27 PROCEDURE — 36415 COLL VENOUS BLD VENIPUNCTURE: CPT

## 2020-06-27 PROCEDURE — 84165 PROTEIN E-PHORESIS SERUM: CPT

## 2020-06-27 PROCEDURE — 2580000003 HC RX 258: Performed by: INTERNAL MEDICINE

## 2020-06-27 PROCEDURE — 82570 ASSAY OF URINE CREATININE: CPT

## 2020-06-27 PROCEDURE — 2580000003 HC RX 258: Performed by: HOSPITALIST

## 2020-06-27 PROCEDURE — 2500000003 HC RX 250 WO HCPCS: Performed by: INTERNAL MEDICINE

## 2020-06-27 PROCEDURE — 86160 COMPLEMENT ANTIGEN: CPT

## 2020-06-27 PROCEDURE — 83970 ASSAY OF PARATHORMONE: CPT

## 2020-06-27 PROCEDURE — 86255 FLUORESCENT ANTIBODY SCREEN: CPT

## 2020-06-27 PROCEDURE — 6370000000 HC RX 637 (ALT 250 FOR IP): Performed by: HOSPITALIST

## 2020-06-27 PROCEDURE — 6360000002 HC RX W HCPCS: Performed by: HOSPITALIST

## 2020-06-27 PROCEDURE — 85007 BL SMEAR W/DIFF WBC COUNT: CPT

## 2020-06-27 PROCEDURE — 84155 ASSAY OF PROTEIN SERUM: CPT

## 2020-06-27 PROCEDURE — 94640 AIRWAY INHALATION TREATMENT: CPT

## 2020-06-27 PROCEDURE — 86757 RICKETTSIA ANTIBODY: CPT

## 2020-06-27 PROCEDURE — 80053 COMPREHEN METABOLIC PANEL: CPT

## 2020-06-27 PROCEDURE — 86039 ANTINUCLEAR ANTIBODIES (ANA): CPT

## 2020-06-27 PROCEDURE — 2140000000 HC CCU INTERMEDIATE R&B

## 2020-06-27 PROCEDURE — 6360000002 HC RX W HCPCS: Performed by: INTERNAL MEDICINE

## 2020-06-27 PROCEDURE — 76770 US EXAM ABDO BACK WALL COMP: CPT

## 2020-06-27 PROCEDURE — 85027 COMPLETE CBC AUTOMATED: CPT

## 2020-06-27 PROCEDURE — 83516 IMMUNOASSAY NONANTIBODY: CPT

## 2020-06-27 PROCEDURE — 87798 DETECT AGENT NOS DNA AMP: CPT

## 2020-06-27 RX ADMIN — MEPERIDINE HYDROCHLORIDE 6.5 MG: 50 INJECTION, SOLUTION INTRAMUSCULAR; INTRAVENOUS; SUBCUTANEOUS at 15:28

## 2020-06-27 RX ADMIN — PIPERACILLIN SODIUM AND TAZOBACTAM SODIUM 3.38 G: 3; .375 INJECTION, POWDER, LYOPHILIZED, FOR SOLUTION INTRAVENOUS at 09:10

## 2020-06-27 RX ADMIN — LISINOPRIL 10 MG: 10 TABLET ORAL at 09:10

## 2020-06-27 RX ADMIN — IPRATROPIUM BROMIDE AND ALBUTEROL SULFATE 1 AMPULE: .5; 3 SOLUTION RESPIRATORY (INHALATION) at 19:45

## 2020-06-27 RX ADMIN — GABAPENTIN 300 MG: 300 CAPSULE ORAL at 09:10

## 2020-06-27 RX ADMIN — DOXYCYCLINE 100 MG: 100 INJECTION, POWDER, LYOPHILIZED, FOR SOLUTION INTRAVENOUS at 06:00

## 2020-06-27 RX ADMIN — IPRATROPIUM BROMIDE AND ALBUTEROL SULFATE 1 AMPULE: .5; 3 SOLUTION RESPIRATORY (INHALATION) at 06:16

## 2020-06-27 RX ADMIN — DOXYCYCLINE 100 MG: 100 INJECTION, POWDER, LYOPHILIZED, FOR SOLUTION INTRAVENOUS at 18:50

## 2020-06-27 RX ADMIN — IPRATROPIUM BROMIDE AND ALBUTEROL SULFATE 1 AMPULE: .5; 3 SOLUTION RESPIRATORY (INHALATION) at 10:12

## 2020-06-27 RX ADMIN — GABAPENTIN 300 MG: 300 CAPSULE ORAL at 20:46

## 2020-06-27 RX ADMIN — LAMOTRIGINE 25 MG: 25 TABLET ORAL at 09:10

## 2020-06-27 RX ADMIN — LEVOTHYROXINE SODIUM 50 MCG: 50 TABLET ORAL at 09:10

## 2020-06-27 RX ADMIN — SODIUM CHLORIDE, PRESERVATIVE FREE 10 ML: 5 INJECTION INTRAVENOUS at 20:47

## 2020-06-27 RX ADMIN — IPRATROPIUM BROMIDE AND ALBUTEROL SULFATE 1 AMPULE: .5; 3 SOLUTION RESPIRATORY (INHALATION) at 14:11

## 2020-06-27 RX ADMIN — ACETAMINOPHEN 650 MG: 325 TABLET, FILM COATED ORAL at 05:24

## 2020-06-27 RX ADMIN — PIPERACILLIN SODIUM AND TAZOBACTAM SODIUM 3.38 G: 3; .375 INJECTION, POWDER, LYOPHILIZED, FOR SOLUTION INTRAVENOUS at 01:30

## 2020-06-27 RX ADMIN — GABAPENTIN 300 MG: 300 CAPSULE ORAL at 15:28

## 2020-06-27 RX ADMIN — ACETAMINOPHEN 650 MG: 325 TABLET, FILM COATED ORAL at 21:56

## 2020-06-27 RX ADMIN — HYDRALAZINE HYDROCHLORIDE 10 MG: 20 INJECTION INTRAMUSCULAR; INTRAVENOUS at 01:30

## 2020-06-27 RX ADMIN — SODIUM CHLORIDE, PRESERVATIVE FREE 10 ML: 5 INJECTION INTRAVENOUS at 09:10

## 2020-06-27 RX ADMIN — MORPHINE SULFATE 2 MG: 4 INJECTION INTRAVENOUS at 02:08

## 2020-06-27 ASSESSMENT — PAIN SCALES - GENERAL
PAINLEVEL_OUTOF10: 1
PAINLEVEL_OUTOF10: 3
PAINLEVEL_OUTOF10: 0
PAINLEVEL_OUTOF10: 6

## 2020-06-27 NOTE — PROGRESS NOTES
INFECTIOUS DISEASES PROGRESS NOTE    Patient:  Terril Cranker  YOB: 1940  MRN: [id number]   Admit date: 6/25/2020   Admitting Physician: Marek Kwong MD  Primary Care Physician: No primary care provider on file. CHIEF COMPLAINT: None offered      Interval History: The patient was seen with Karla Boone RN at bedside. He has been more awake and alert. Overall temperature curve has improved significantly and is down in the 99 range. Patient does not remember meeting me yesterday. He was alert, able to tell me where his daughter lived. He denied pain. He denied any pruritus or rash. When asked about some the discoloration of his hands he attributed it to a fall.       Allergies: No Known Allergies    Current Meds: doxycycline (VIBRAMYCIN) 100 mg in dextrose 5 % 100 mL IVPB, Q12H  meperidine (DEMEROL) injection 6.5 mg, Q4H PRN  DOBUTamine (DOBUTREX) 500 mg in dextrose 5 % 250 mL infusion, Continuous  acetaminophen (TYLENOL) tablet 650 mg, Q6H PRN    Or  acetaminophen (TYLENOL) suppository 650 mg, Q6H PRN  aspirin chewable tablet 81 mg, Daily  polyethylene glycol (GLYCOLAX) packet 17 g, Daily PRN  melatonin tablet 3 mg, Nightly PRN  ondansetron (ZOFRAN-ODT) disintegrating tablet 4 mg, Q8H PRN    Or  ondansetron (ZOFRAN) injection 4 mg, Q6H PRN  nitroGLYCERIN (NITROSTAT) SL tablet 0.4 mg, Q5 Min PRN  lisinopril (PRINIVIL;ZESTRIL) tablet 10 mg, Daily  lamoTRIgine (LAMICTAL) tablet 25 mg, Daily  levothyroxine (SYNTHROID) tablet 50 mcg, Daily  sodium chloride flush 0.9 % injection 10 mL, 2 times per day  sodium chloride flush 0.9 % injection 10 mL, PRN  levoFLOXacin (LEVAQUIN) 250 MG/50ML infusion 250 mg, Q24H  DOPamine (INTROPIN) 400 mg in dextrose 5 % 250 mL infusion, Continuous  gabapentin (NEURONTIN) capsule 300 mg, TID  vancomycin (VANCOCIN) 1,250 mg in dextrose 5 % 250 mL IVPB, Q24H  vancomycin (VANCOCIN) intermittent dosing (placeholder), RX Placeholder  piperacillin-tazobactam (ZOSYN) 3.375 g in dextrose 5 % 50 mL IVPB extended infusion (mini-bag), Q8H  ipratropium-albuterol (DUONEB) nebulizer solution 1 ampule, Q4H WA  hydrALAZINE (APRESOLINE) injection 10 mg, Q4H PRN  morphine injection 1 mg, Q4H PRN  morphine injection 2 mg, Q4H PRN  naloxone (NARCAN) injection 0.4 mg, PRN        Review of Systems   Constitutional: Positive for fever. Neurological: Positive for weakness. Vital Signs:  /63   Pulse 75   Temp 99.4 °F (37.4 °C) (Bladder)   Resp 17   Ht 6' (1.829 m)   Wt 175 lb 6.4 oz (79.6 kg)   SpO2 94%   BMI 23.79 kg/m²   Temp (24hrs), Av.2 °F (39 °C), Min:99.4 °F (37.4 °C), Max:104.7 °F (40.4 °C)      Physical Exam  General: The patient is an elderly male, much more alert and in no acute distress lying in bed in the ICU  HEENT: Sclera anicteric and noninjected. No oral pharyngeal lesions  Neck: Without meningismus  Respiratory: Effort even and unlabored  Abdomen: Soft, bowel sounds are positive, nontender  Skin: Patient does have discoloration of his skin with some areas of ecchymosis in various stages of coloration. He does have a few scattered areas of some erythematous macules and papules on his chest that I do not recall seeing yesterday. No other similar rash noted on arms, legs, abdomen. Neuro: He was alert, much more clear, follows simple commands  Psych: He was pleasant and cooperative    Line/IV site: No erythema,warmth, induration, or tenderness.   Right upper extremity antecubital, right wrist    LAB RESULTS:    CBC with DIFF:  Recent Labs     20  0255 20  0422 20  0403   WBC 3.5* 3.2* 2.7*   RBC 4.43* 4.07* 4.28*   HGB 13.9* 12.9* 13.5*   HCT 42.5 37.6* 39.3*   MCV 95.9* 92.4 91.8   MCH 31.4* 31.7* 31.5*   MCHC 32.7* 34.3 34.4   RDW 12.8 12.7 13.1   PLT 80* 65* 59*   MPV 12.4 12.9* 13.8*   NEUTOPHILPCT 62.6  --  48.0*   LYMPHOPCT 27.0  --  24.0   MONOPCT 8.6  --  10.0   EOSRELPCT 0.3  --  0.0   BASOPCT 0.9  --  2.0*   NEUTROABS 2.2  --  1.5 LYMPHSABS 0.9*  --  0.9*   MONOSABS 0.30  --  0.30   EOSABS 0.00  --  0.00   BASOSABS 0.00  --  0.10       CMP/BMP:  Recent Labs     06/25/20  0255 06/25/20  0504 06/26/20  0423 06/27/20  0403     --  134* 134*   K 4.1 4.0 4.1 4.1     --  101 100   CO2 21*  --  22 21*   ANIONGAP 11  --  11 13   GLUCOSE 96  --  94 107   BUN 15  --  15 17   CREATININE 1.7*  --  1.5* 1.7*   LABGLOM 39*  --  45* 39*   CALCIUM 8.8  --  8.6* 8.9   PROT 5.7*  --   --  5.3*   LABALBU 3.6  --   --  3.3*   BILITOT 0.7  --   --  2.0*   ALKPHOS 79  --   --  139*   ALT 22  --   --  95*   AST 49*  --   --  221*     >>>>>>>>>>>>>>>>>>>>>>>>>>>>>>>>>>>>>>>> 48% neutro, 6% bands, 10% atypical lymph    Culture Results:    No results for input(s): CXSURG in the last 720 hours. Blood Culture Recent:   Recent Labs     06/25/20  0908   BC No Growth to date. Any change in status will be called. Urine culture no growth to date  [address] and blood culture from June 23- to date, urine culture negative, COVID-19 test negative, influenza a and B screen negative        RADIOLOGY      Ct Head Wo Contrast    Result Date: 6/25/2020  EXAM: CT HEAD WO CONTRAST -- 6/25/2020 2:15 PM HISTORY: 79 years, Male, confusion COMPARISON: None DLP: 805 mGy cm. Automated exposure control was utilized to minimize patient radiation dose. TECHNIQUE: Unenhanced axial CT images obtained from vertex to skull base with coronal and sagittal reformats. FINDINGS: No acute intracranial hemorrhage, midline shift, mass effect or large territory cortical infarct. Confluent periventricular and subcortical white matter hypodensities, most likely due to chronic microvascular disease. Ventricles, sulci, basilar cisterns are proportionally prominent suggesting volume loss. Thinning of the ocular lenses may be postoperative or age-related. Paranasal sinuses and mastoid air cells normally aerated. External auditory canals within normal limits. Calvarium appears intact. Subcutaneous tissues within normal limits. 1. No acute intracranial hemorrhage, midline shift or mass effect. 2. Confluent periventricular and subcortical white matter hypodensities with volume loss, most commonly chronic microvascular changes. If clinical concern for acute infarct, MRI would be a more sensitive exam. Signed by Dr Jeffry Mello on 6/25/2020 4:22 PM    Xr Chest Portable    Result Date: 6/25/2020  EXAMINATION:  XR CHEST PORTABLE  6/25/2020 1:12 PM HISTORY: Shortness of breath. COMPARISON: No comparison study. FINDINGS:  There are patchy infiltrates in the perihilar regions and lung bases. There is minimal blunting of the costophrenic angles. Heart size is upper limits of normal. Thoracic aorta is atheromatous. 1. Bilateral infiltrates likely representing pulmonary edema. Pneumonia is possible. 2. There may be small pleural effusions. Heart size is upper limits of normal. Signed by Dr Jay Blanton on 6/25/2020 1:23 PM    Vl Dup Lower Extremity Venous Bilateral    Result Date: 6/25/2020  Vascular Lower Extremities DVT Study Procedure  Demographics   Patient Name     Ariella Monsivais Age                    [de-identified]   Patient Number   [id number]       Gender                 Male   Visit Number     [id number]    Interpreting Physician Margarita Beebe MD   Date of Birth    1940   Referring Physician    Parveen Best   Accession Number [id number]   Irene 70, RVT  Procedure Type of Study:   Veins:Lower Extremities DVT Study, VL LOWER EXTREMITY BILATERAL VENOUS  DUPLEX . Technical Quality:Limited visualization due to combative patient. Impression   Unable to complete exam because patient was shaking uncontrollably. Only  right common femoral vein and saphenofemoral junction were able to be  imaged. No DVT/SVT seen in these areas. The remainder of the right leg  could not be imaged and therefore could not \"rule out\" deep or superficial  venous thrombosis. Signature   ----------------------------------------------------------------  Electronically signed by Sarai Emmanuel MD(Interpreting  physician) on 06/25/2020 05:28 PM  ---------------------------------------------------------------- ------------------------------------+----------+---------------+----------+    Cta Pulmonary W Contrast    Result Date: 6/25/2020  CTA PULMONARY W CONTRAST 6/25/2020 11:30 AM HISTORY: Elevated d-dimer. Shortness of breath. Clinical finding is worrisome for pulmonary embolus Comparison: None Technique: CT evaluation of the chest was performed with intravenous contrast. 2 mm transaxial images were obtained. Maximum intensity projection reconstruction angiographic images were generated . Two-dimensional Coronal and sagittal reconstruction images of the pulmonary arteries, aorta and great vessels were also generated. .Radiation dose equals  mGy cm. AUTOMATED EXPOSURE CONTROL dose reduction technique was implemented Findings: There is image quadrant degradation related to breathing motion artifact. Pulmonary arteries opacify with iodinated contrast without intraluminal filling defects or convincing CT angiographic evidence of pulmonary embolus. The aorta is normal in caliber, without aneurysm or dissection. There is ectasia of the aortic arch with aortic calcifications. There is cardiomegaly with coronary artery calcifications. There are calcified left hilar lymph nodes. There is no mediastinal hilar lymphadenopathy. There are small bilateral pleural effusions with lower lobe airspace opacities, suspect atelectasis. There are emphysematous changes in the upper lobes with bleb formation. Evaluation of the lung fields challenging due to motion, however no definite consolidating parenchymal infiltrates identified. Thyroid gland enlargement with nodules. Left-sided nodule measures 2.3 cm. Right-sided nodule measures 1.6 cm. There is no axillary lymph node enlargement.  Limited imaging the upper abdomen demonstrate no acute abnormality. There is pectus excavatum anterior chest wall deformity. No acute osseous abnormalities observed. Impression: 1. No CT angiographic evidence of pulmonary embolus or acute aortic pathology. 2. Small bilateral pleural effusions with associated airspace opacities likely atelectasis. 3. Cardiomegaly with atherosclerotic calcifications. 4. Thyroid enlargement with nodules. 5. Pectus excavatum anterior chest wall deformity. Signed by Dr Rena Toribio on 6/25/2020 4:26 PM                  Patient Active Problem List   Diagnosis    Symptomatic bradycardia       IMPRESSION:    1. Fever to almost 105-temperature curve appears to be improving. No pneumonia on CT angiogram, urine and blood cultures [facility]remark[facility] outside hospital [facility]. No other obvious source on exam.  Working diagnosis is tickborne most likely Ehrlichia  2. Leukopenia, thrombocytopenia-trending down  3. Elevated transaminases-worsening  4. Acute kidney injury with hematuria and proteinuria  5.  Recent falls    RECOMMENDATIONS :  · Dc vanco  · Dc zosyn  · Dc levofloxacin  · Continue doxycycline   · Monitor CLOSELY with change of abx    Discussed with Crispin Campoverde RN  Discussed with Dr. Dinora Rodriguez MD

## 2020-06-27 NOTE — PLAN OF CARE
Problem: Falls - Risk of:  Goal: Will remain free from falls  Description: Will remain free from falls  6/26/2020 2232 by Lisa Patton RN  Outcome: Ongoing  6/26/2020 1654 by Heri Moran RN  Outcome: Ongoing  6/26/2020 1044 by Tristan Brown RN  Outcome: Ongoing  Goal: Absence of physical injury  Description: Absence of physical injury  6/26/2020 2232 by Lisa Patton RN  Outcome: Ongoing  6/26/2020 1654 by Heri Moran RN  Outcome: Ongoing     Problem: Pain:  Goal: Pain level will decrease  Description: Pain level will decrease  6/26/2020 2232 by Lisa Patton RN  Outcome: Ongoing  6/26/2020 1654 by Heri Moran RN  Outcome: Ongoing  6/26/2020 1044 by Tristan Brown RN  Outcome: Ongoing  Goal: Control of acute pain  Description: Control of acute pain  6/26/2020 2232 by Lisa Patton RN  Outcome: Ongoing  6/26/2020 1654 by Heri Moran RN  Outcome: Ongoing  Goal: Control of chronic pain  Description: Control of chronic pain  6/26/2020 2232 by Lisa Patton RN  Outcome: Ongoing  6/26/2020 1654 by Heri Moran RN  Outcome: Ongoing

## 2020-06-27 NOTE — PROGRESS NOTES
Joseph Greer received from icu to room # . Mental Status: Patient is oriented and alert. Vitals:    06/27/20 1552   BP:    Pulse: 106   Resp:    Temp:    SpO2:      Placed on cardiac monitor: Yes. Box # [id number]. Belongings: None with public safety . Family at bedside Yes. Oriented Patient to room. Call light within reach. Yes. Transfer was: Well tolerated by patient. .    Electronically signed by Jose Raul Sloan RN on 6/27/2020 at 3:57 PM

## 2020-06-27 NOTE — CONSULTS
Nephrology ([facility]) Consult Note      Patient:  Jessica You  YOB: 1940  Date of Service: 6/27/2020  MRN: [id number]   Acct: [de-identified]   Primary Care Physician: No primary care provider on file. Advance Directive: Full Code  Admit Date: 6/25/2020       Hospital Day: 2  Referring Provider: Ousmane Hair MD    Patient independently seen and examined, Chart, Consults, Notes, Operative notes, Labs, Cardiology, and Radiology studies reviewed as able. Chief complaint: Abnormal labs. Subjective: Joseph Degroot is a [de-identified] y.o. male  whom we were consulted for acute kidney injury. Patient denies any history of chronic kidney disease. Presented to the emergency room as he was brought in by family to the [facility] ER with fever up to 105. In the emergency room he is found to have bradycardia data, increasing creatinine, thrombocytopenia and leukopenia. He is now transferred to [facility] for further evaluation of fever. Apparently he was confused and has encephalopathy which is also resolving slowly. He has history of hypertension, sleep apnea and trigeminal neuralgia. Nephrology is consulted as he has serum creatinine of 1.7 mg.  His urinalysis shows patient has proteinuria and hematuria. Patient has low blood pressure, requiring low-dose Levophed. Allergies:  Patient has no known allergies.     Medicines:  Current Facility-Administered Medications   Medication Dose Route Frequency Provider Last Rate Last Dose    doxycycline (VIBRAMYCIN) 100 mg in dextrose 5 % 100 mL IVPB  100 mg Intravenous Q12H Kenna Munguia MD   Stopped at 06/27/20 0700    meperidine (DEMEROL) injection 6.5 mg  6.5 mg Intravenous Q4H PRN Kenna Munguia MD   6.5 mg at 06/26/20 2023    DOBUTamine (DOBUTREX) 500 mg in dextrose 5 % 250 mL infusion  2.5 mcg/kg/min Intravenous Continuous Sonja Adame MD        acetaminophen (TYLENOL) tablet 650 mg  650 mg Oral Q6H PRN Issac Hernandez MD   650 mg at 06/27/20 0524    Or    acetaminophen (TYLENOL) suppository 650 mg  650 mg Rectal Q6H PRN Issac Hernandez MD   650 mg at 06/25/20 8987    aspirin chewable tablet 81 mg  81 mg Oral Daily Issac Hernandez MD   81 mg at 06/26/20 0949    polyethylene glycol (GLYCOLAX) packet 17 g  17 g Oral Daily PRN Issac Hernandez MD        melatonin tablet 3 mg  3 mg Oral Nightly PRN Issac Hernandez MD        ondansetron (ZOFRAN-ODT) disintegrating tablet 4 mg  4 mg Oral Q8H PRN Issac Hernandez MD        Or    [facility]ron [facility] injection 4 mg  4 mg Intravenous Q6H PRN Issac Hernandez MD        nitroGLYCERIN (NITROSTAT) SL tablet 0.4 mg  0.4 mg Sublingual Q5 Min PRN Issac Hernandez MD        lisinopril (PRINIVIL;ZESTRIL) tablet 10 mg  10 mg Oral Daily Issac Hernandez MD   10 mg at 06/27/20 0910    lamoTRIgine (LAMICTAL) tablet 25 mg  25 mg Oral Daily Issac Hernandez MD   25 mg at 06/27/20 0910    levothyroxine (SYNTHROID) tablet 50 mcg  50 mcg Oral Daily Issac Hernandez MD   50 mcg at 06/27/20 0910    sodium chloride flush 0.9 % injection 10 mL  10 mL Intravenous 2 times per day Raine Masters MD   10 mL at 06/27/20 0910    sodium chloride flush 0.9 % injection 10 mL  10 mL Intravenous PRN Raine Masters MD        levoFLOXacin (LEVAQUIN) 250 MG/50ML infusion 250 mg  250 mg Intravenous Q24H Raine Masters MD   Stopped at 06/26/20 1200    DOPamine (INTROPIN) 400 mg in dextrose 5 % 250 mL infusion  2.5 mcg/kg/min Intravenous Continuous Danielle Vitale MD   Stopped at 06/25/20 1705    gabapentin (NEURONTIN) capsule 300 mg  300 mg Oral TID Raine Masters MD   300 mg at 06/27/20 0910    vancomycin (VANCOCIN) 1,250 mg in dextrose 5 % 250 mL IVPB  1,250 mg Intravenous Q24H Raine Masters MD   Stopped at 06/26/20 1811    vancomycin (VANCOCIN) intermittent dosing (placeholder)   Other RX Placeholder Raine Masters MD        piperacillin-tazobactam (ZOSYN) 3.375 g in dextrose 5 % 50 mL IVPB extended infusion (mini-bag)  3.375 g Intravenous Q8H Myles Centeno MD 12.5 mL/hr at 06/27/20 0910 3.375 g at 06/27/20 0910    ipratropium-albuterol (DUONEB) nebulizer solution 1 ampule  1 ampule Inhalation Q4H WA Myles Centeno MD   1 ampule at 06/27/20 8910    hydrALAZINE (APRESOLINE) injection 10 mg  10 mg Intravenous Q4H PRN Dino Ford MD   10 mg at 06/27/20 0130    morphine injection 1 mg  1 mg Intravenous Q4H PRN Dino Ford MD   1 mg at 06/26/20 0211    morphine injection 2 mg  2 mg Intravenous Q4H PRN Dino Ford MD   2 mg at 06/27/20 0208    naloxone [facility]) injection 0.4 mg  0.4 mg Intravenous PRN Dino Ford MD           Past Medical History:  Past Medical History:   Diagnosis Date    Hypertension     Palliative care patient 06/26/2020    Sleep apnea     Thyroid disease     Trigeminal neuropathy        Past Surgical History:  Past Surgical History:   Procedure Laterality Date    CHOLECYSTECTOMY      HERNIA REPAIR         Family History  No family history on file.     Social History  Social History     Socioeconomic History    Marital status:      Spouse name: Pipo Callejas    Number of children: 2    Years of education: Not on file    Highest education level: Not on file   Occupational History    Not on file   Social Needs    Financial resource strain: Not on file    Food insecurity     Worry: Not on file     Inability: Not on file    Transportation needs     Medical: Not on file     Non-medical: Not on file   Tobacco Use    Smoking status: Former Smoker     Types: Cigarettes    Smokeless tobacco: Never Used   Substance and Sexual Activity    Alcohol use: Not on file    Drug use: Never    Sexual activity: Not Currently     Partners: Male   Lifestyle    Physical activity     Days per week: Not on file     Minutes per session: Not on file    Stress: Not on file   Relationships    Social connections     Talks on phone: Not on file     Gets together: Not on file     Attends [employer] service: Not on file     Active member of club or organization: Not on file     Attends meetings of clubs or organizations: Not on file     Relationship status: Not on file    Intimate partner violence     Fear of current or ex partner: Not on file     Emotionally abused: Not on file     Physically abused: Not on file     Forced sexual activity: Not on file   Other Topics Concern    Not on file   Social History Narrative    Not on file         Review of Systems:  History obtained from chart review and the patient  General ROS: High-grade fever  Respiratory ROS: No cough, shortness of breath, wheezing  Cardiovascular ROS: No chest pain or palpitations  Gastrointestinal ROS: No abdominal pain or melena  Genito-Urinary ROS: No dysuria or hematuria  Musculoskeletal ROS: No joint pain or swelling   14 point ROS reviewed with the patient and negative except as noted above and in the HPI unless unable to obtain.     Objective:  Patient Vitals for the past 24 hrs:   BP Temp Temp src Pulse Resp SpO2 Weight   06/27/20 0910 139/63 -- -- -- -- -- --   06/27/20 0900 139/63 99.4 °F (37.4 °C) Bladder 75 17 94 % --   06/27/20 0800 (!) 125/44 100 °F (37.8 °C) Bladder 70 13 92 % --   06/27/20 0700 (!) 122/53 100.3 °F (37.9 °C) Oral 74 18 92 % --   06/27/20 0503 -- 101.1 °F (38.4 °C) -- -- -- -- 175 lb 6.4 oz (79.6 kg)   06/27/20 0502 (!) 146/57 -- -- 76 18 96 % --   06/27/20 0400 (!) 132/106 100.9 °F (38.3 °C) Bladder 90 17 93 % --   06/27/20 0300 (!) 147/62 -- -- 94 15 93 % --   06/27/20 0200 138/62 -- -- 92 19 93 % --   06/27/20 0151 129/63 -- -- 89 17 94 % --   06/27/20 0103 (!) 191/25 -- -- 99 16 92 % --   06/27/20 0100 -- 101.1 °F (38.4 °C) Bladder 90 20 92 % --   06/27/20 0000 (!) 162/95 103 °F (39.4 °C) Bladder 103 17 91 % --   06/26/20 2300 (!) 160/102 103.5 °F (39.7 °C) Bladder 108 17 91 % --   06/26/20 2200 -- 103.4 °F (39.7 °C) Bladder 86 19 90 % --   06/26/20 2100 -- -- -- 82 14 92 % --   06/26/20 2000 (!) 143/85 -- -- 91 17 93 % --   06/26/20 1900 (!) 163/90 102.9 °F (39.4 °C) CORE 90 23 (!) 85 % --   06/26/20 1801 118/75 -- -- 83 16 91 % --   06/26/20 1700 (!) 142/77 -- -- 111 20 95 % --   06/26/20 1600 (!) 138/91 102.5 °F (39.2 °C) Bladder 104 23 93 % --   06/26/20 1500 (!) 137/52 -- -- 89 19 -- --   06/26/20 1400 (!) 141/54 101.1 °F (38.4 °C) Bladder 75 19 92 % --   06/26/20 1300 137/63 -- -- 93 18 91 % --   06/26/20 1200 (!) 143/58 101.8 °F (38.8 °C) Bladder 93 17 94 % --   06/26/20 1157 -- 100.6 °F (38.1 °C) Bladder -- -- -- --   06/26/20 1115 -- 103.7 °F (39.8 °C) Bladder 106 17 94 % --   06/26/20 1100 (!) 125/55 -- -- 79 17 (!) 89 % --   06/26/20 1046 (!) 121/95 104.6 °F (40.3 °C) Bladder 103 18 90 % --   06/26/20 1038 (!) 152/93 104.7 °F (40.4 °C) Bladder 103 18 (!) 84 % --   06/26/20 1037 -- -- -- 101 20 91 % --   06/26/20 1016 (!) 175/122 104 °F (40 °C) Bladder -- -- -- --   06/26/20 1013 (!) 175/122 -- -- 110 20 96 % --       Intake/Output Summary (Last 24 hours) at 6/27/2020 1011  Last data filed at 6/27/2020 0910  Gross per 24 hour   Intake 1075 ml   Output 2150 ml   Net -1075 ml     General: awake/alert   HEENT: Normocephalic atraumatic head  Neck: Supple no JVD or carotid bruits.   Chest:  clear to auscultation bilaterally without respiratory distress  CVS: regular rate and rhythm  Abdominal: soft, nontender, normal bowel sounds  Extremities: no cyanosis or edema  Skin: warm and dry without rash      Labs:  BMP:   Recent Labs     06/25/20  0255 06/25/20  0504 06/26/20  0423 06/27/20  0403     --  134* 134*   K 4.1 4.0 4.1 4.1     --  101 100   CO2 21*  --  22 21*   PHOS 2.0*  --   --   --    BUN 15  --  15 17   CREATININE 1.7*  --  1.5* 1.7*   CALCIUM 8.8  --  8.6* 8.9     CBC:   Recent Labs     06/25/20  0255 06/26/20  0422 06/27/20  0403   WBC 3.5* 3.2* 2.7*   HGB 13.9* 12.9* 13.5*   HCT 42.5 37.6* 39.3*   MCV 95.9* 92.4 91.8   PLT 80* 65* 59*     LIVER PROFILE:   Recent Labs 06/25/20  0255 06/27/20  0403   AST 49* 221*   ALT 22 95*   BILITOT 0.7 2.0*   ALKPHOS 79 139*     PT/INR:   Recent Labs     06/25/20  0255   PROTIME 15.6*   INR 1.24*     APTT:   Recent Labs     06/25/20  0255   APTT 34.2     BNP:  No results for input(s): BNP in the last 72 hours. Ionized Calcium:No results for input(s): IONCA in the last 72 hours. Magnesium:  Recent Labs     06/25/20  0255   MG 1.8     Phosphorus:  Recent Labs     06/25/20  0255   PHOS 2.0*     HgbA1C:   Recent Labs     06/25/20  0255   LABA1C 5.4     Hepatic:   Recent Labs     06/25/20  0255 06/27/20  0403   ALKPHOS 79 139*   ALT 22 95*   AST 49* 221*   PROT 5.7* 5.3*   BILITOT 0.7 2.0*   LABALBU 3.6 3.3*     Lactic Acid: No results for input(s): LACTA in the last 72 hours. Troponin: No results for input(s): CKTOTAL, CKMB, TROPONINT in the last 72 hours. ABGs: No results for input(s): PH, PCO2, PO2, HCO3, O2SAT in the last 72 hours. CRP:    Recent Labs     06/25/20  1118   CRP 5.28*     Sed Rate:    Recent Labs     06/25/20  1118   SEDRATE 4         Cultures:   No results for input(s): CULTURE in the last 72 hours. Recent Labs     06/25/20  0908 06/25/20  1006   BC No Growth to date. Any change in status will be called. --    BLOODCULT2  --  No Growth to date. Any change in status will be called. No results for input(s): CXSURG in the last 72 hours. Radiology reports as per the Radiologist  Radiology: Ct Head Wo Contrast    Result Date: 6/25/2020  EXAM: CT HEAD WO CONTRAST -- 6/25/2020 2:15 PM HISTORY: 79 years, Male, confusion COMPARISON: None DLP: 805 mGy cm. Automated exposure control was utilized to minimize patient radiation dose. TECHNIQUE: Unenhanced axial CT images obtained from vertex to skull base with coronal and sagittal reformats. FINDINGS: No acute intracranial hemorrhage, midline shift, mass effect or large territory cortical infarct.  Confluent periventricular and subcortical white matter hypodensities, most likely due to chronic microvascular disease. Ventricles, sulci, basilar cisterns are proportionally prominent suggesting volume loss. Thinning of the ocular lenses may be postoperative or age-related. Paranasal sinuses and mastoid air cells normally aerated. External auditory canals within normal limits. Calvarium appears intact. Subcutaneous tissues within normal limits. 1. No acute intracranial hemorrhage, midline shift or mass effect. 2. Confluent periventricular and subcortical white matter hypodensities with volume loss, most commonly chronic microvascular changes. If clinical concern for acute infarct, MRI would be a more sensitive exam. Signed by Dr Alvaro Menard on 6/25/2020 4:22 PM    Xr Chest Portable    Result Date: 6/25/2020  EXAMINATION:  XR CHEST PORTABLE  6/25/2020 1:12 PM HISTORY: Shortness of breath. COMPARISON: No comparison study. FINDINGS:  There are patchy infiltrates in the perihilar regions and lung bases. There is minimal blunting of the costophrenic angles. Heart size is upper limits of normal. Thoracic aorta is atheromatous. 1. Bilateral infiltrates likely representing pulmonary edema. Pneumonia is possible. 2. There may be small pleural effusions. Heart size is upper limits of normal. Signed by Dr Birdie Maria on 6/25/2020 1:23 PM    Vl Dup Lower Extremity Venous Bilateral    Result Date: 6/25/2020  Vascular Lower Extremities DVT Study Procedure  Demographics   Patient Name     Jono Oden Age                    [de-identified]   Patient Number   [id number]       Gender                 Male   Visit Number     [id number]    Interpreting Physician Sy Wiggins MD   Date of Birth    1940   Referring Physician    Marek Kwong   Accession Number [id number]   Montseangbrannon De Leon, RVT  Procedure Type of Study:   Veins:Lower Extremities DVT Study, VL LOWER EXTREMITY BILATERAL VENOUS  DUPLEX . Technical Quality:Limited visualization due to combative patient.   Impression   Unable to complete exam because patient was shaking uncontrollably. Only  right common femoral vein and saphenofemoral junction were able to be  imaged. No DVT/SVT seen in these areas. The remainder of the right leg  could not be imaged and therefore could not \"rule out\" deep or superficial  venous thrombosis. Signature   ----------------------------------------------------------------  Electronically signed by Myah Montenegro MD(Interpreting  physician) on 06/25/2020 05:28 PM  ----------------------------------------------------------------  Velocities are measured in cm/s ; Diameters are measured in mm Right Lower Extremities DVT Study Measurements Right 2D Measurements +------------------------------------+----------+---------------+----------+ ! Location                            ! Visualized! Compressibility! Thrombosis! +------------------------------------+----------+---------------+----------+ ! Sapheno Femoral Junction            ! Yes       ! Yes            ! None      ! +------------------------------------+----------+---------------+----------+ ! Common Femoral                      !Yes       ! Yes            ! None      ! +------------------------------------+----------+---------------+----------+    Cta Pulmonary W Contrast    Result Date: 6/25/2020  CTA PULMONARY W CONTRAST 6/25/2020 11:30 AM HISTORY: Elevated d-dimer. Shortness of breath. Clinical finding is worrisome for pulmonary embolus Comparison: None Technique: CT evaluation of the chest was performed with intravenous contrast. 2 mm transaxial images were obtained. Maximum intensity projection reconstruction angiographic images were generated . Two-dimensional Coronal and sagittal reconstruction images of the pulmonary arteries, aorta and great vessels were also generated. .Radiation dose equals  mGy cm. AUTOMATED EXPOSURE CONTROL dose reduction technique was implemented Findings: There is image quadrant degradation related to breathing motion artifact. Pulmonary arteries opacify with iodinated contrast without intraluminal filling defects or convincing CT angiographic evidence of pulmonary embolus. The aorta is normal in caliber, without aneurysm or dissection. There is ectasia of the aortic arch with aortic calcifications. There is cardiomegaly with coronary artery calcifications. There are calcified left hilar lymph nodes. There is no mediastinal hilar lymphadenopathy. There are small bilateral pleural effusions with lower lobe airspace opacities, suspect atelectasis. There are emphysematous changes in the upper lobes with bleb formation. Evaluation of the lung fields challenging due to motion, however no definite consolidating parenchymal infiltrates identified. Thyroid gland enlargement with nodules. Left-sided nodule measures 2.3 cm. Right-sided nodule measures 1.6 cm. There is no axillary lymph node enlargement. Limited imaging the upper abdomen demonstrate no acute abnormality. There is pectus excavatum anterior chest wall deformity. No acute osseous abnormalities observed. Impression: 1. No CT angiographic evidence of pulmonary embolus or acute aortic pathology. 2. Small bilateral pleural effusions with associated airspace opacities likely atelectasis. 3. Cardiomegaly with atherosclerotic calcifications. 4. Thyroid enlargement with nodules. 5. Pectus excavatum anterior chest wall deformity. Signed by Dr Mark Diaz on 6/25/2020 4:26 PM       Assessment   1. Acute kidney injury/unclear etiology. 2.  Possible glomerulonephritis. 3.  Thrombocytopenia. 4.  Neutropenia. 5.  Hyponatremia.  6.? Ehrlichiosis. 7.  Bradycardia. Plan:  1. Get routine serology. 2.  Urinary electrolyte. 3.  Baseline renal ultrasound. 5.  Urinary protein to creatinine ratio. 6.  Agree with IV antibiotics and doxycycline. Thank you for the consult, we appreciate the opportunity to provide care to your patients.   Feel free to contact me if I can be of any further assistance.       Andrew Blas MD  06/27/20  10:11 AM

## 2020-06-27 NOTE — PROGRESS NOTES
Recent Labs     06/26/20  0423 06/27/20  0403   BUN 15 17       Recent Labs     06/26/20  0423 06/27/20  0403   CREATININE 1.5* 1.7*       Estimated Creatinine Clearance: 38 mL/min (A) (based on SCr of 1.7 mg/dL (H)).       Plan: Continue Levaquin 250 mg IV every 24 hours    Electronically signed by Juve Mireles, [address] [place] [address] on 6/27/2020 at 5:57 AM

## 2020-06-27 NOTE — PROGRESS NOTES
Vancomycin Day: 3  Current Dosing: Vancomycin 1250 mg IV every 24 hours    Temp max:  104.7    Recent Labs     06/26/20  0423 06/27/20  0403   BUN 15 17       Recent Labs     06/26/20  0423 06/27/20  0403   CREATININE 1.5* 1.7*       Recent Labs     06/26/20  0422 06/27/20  0403   WBC 3.2* 2.7*         Intake/Output Summary (Last 24 hours) at 6/27/2020 0600  Last data filed at 6/27/2020 0530  Gross per 24 hour   Intake 1065 ml   Output 2200 ml   Net -1135 ml     Culture Date Source Results   06/25/20 Blood No growth to date   06/25/20 Urine No growth                 Ht Readings from Last 1 Encounters:   06/25/20 6' (1.829 m)        Wt Readings from Last 1 Encounters:   06/27/20 175 lb 6.4 oz (79.6 kg)         Body mass index is 23.79 kg/m². Estimated Creatinine Clearance: 38 mL/min (A) (based on SCr of 1.7 mg/dL (H)).     Level ordered for: 06/28 at 1530    Assessment/Plan:  Await level    Electronically signed by Laura Bryant RPH on 6/27/2020 at 6:00 AM

## 2020-06-27 NOTE — PROGRESS NOTES
4 Eyes Skin Assessment    Joseph Carrasquillo is being assessed upon: {Blank single:75570::\"Admission\",\"Transfer to New Unit\"}    I agree that I, Kerri Estrella, along with alexandrea (either 2 RN's or 1 LPN and 1 RN) have performed a thorough Head to Toe Skin Assessment on the patient. ALL assessment sites listed below have been assessed. Areas assessed by both nurses:     [x]   Head, Face, and Ears   [x]   Shoulders, Back, and Chest  [x]   Arms, Elbows, and Hands   [x]   Coccyx, Sacrum, and Ischium  [x]   Legs, Feet, and Heels    Does the Patient have Skin Breakdown?  No    Salvatore Prevention initiated: No  Wound Care Orders initiated: No    WOC nurse consulted for Pressure Injury (Stage 3,4, Unstageable, DTI, NWPT, and Complex wounds) and New or Established Ostomies: No        Primary Nurse eSignature: Kerri Estrella RN on 6/27/2020 at 3:58 PM      Co-Signer eSignature: {Esignature:[id number]}

## 2020-06-27 NOTE — PROGRESS NOTES
[facility]      Patient:  Destin Kay  YOB: 1940  Date of Service: 6/27/2020  MRN: [id number]   Acct: [de-identified]   Primary Care Physician: No primary care provider on file. Advance Directive: Full Code  Admit Date: 6/25/2020       Hospital Day: 2    Chief Complaint: No chief complaint on file. Mr. Destin Kay is a [de-identified]year old  american gentleman. He was referred to us from the [facility] ED by the EP Dr. Crystal Adame who also happens to be his PCP. He states that the patient has no history of bradycardia. The patient was on the way back home from being seen in the PCP's office for a routine checkup. The patient and his wife stopped at the store on the way home, and he had waited in the car. When his wife came back he was unarousable. He was seen and assessed at the ED, and was found to have a heart rate of 34-42bpm. He was referred for cardiac evaluation. [address] [facility] covid test results negative. Subjective: pt seen and examined; axox3, awake. Appears to be back to baseline. Low grade fevers. Denies any chest pain or sob. No visible rashes noted. Objective:   VITALS:  BP (!) 146/57   Pulse 76   Temp 101.1 °F (38.4 °C)   Resp 18   Ht 6' (1.829 m)   Wt 175 lb 6.4 oz (79.6 kg)   SpO2 96%   BMI 23.79 kg/m²   24HR INTAKE/OUTPUT:      Intake/Output Summary (Last 24 hours) at 6/27/2020 0817  Last data filed at 6/27/2020 0530  Gross per 24 hour   Intake 1065 ml   Output 1950 ml   Net -885 ml     Constitutional: axox3  HENT:    Head: Normocephalic and atraumatic. Mouth/Throat: Oropharynx is clear and moist. No oropharyngeal exudate. Eyes: Conjunctivae and EOM are normal. Pupils are equal, round, and reactive to light. No scleral icterus. Neck: Normal range of motion. Neck supple. No JVD present. No tracheal deviation present. No thyromegaly present. Cardiovascular: s1s2 sarah     Pulmonary/Chest: Effort normal and breath sounds normal. No stridor. No respiratory distress. No wheezes. No rales. Abdominal: Soft. Bowel sounds are normal. No distension and no mass. There is no tenderness. There is no rebound and no guarding. Musculoskeletal: Normal range of motion. There is no edema or tenderness. Extremities: No edema  Neurological: lethargic, confused  Skin: Skin is warm and dry. No rash noted. Not diaphoretic. No erythema. No pallor. Psychiatric: Normal mood and affect.  Behavior is normal. Judgment and thought content normal.     Medications:      DOBUTamine      DOPamine Stopped (06/25/20 1705)      doxycycline (VIBRAMYCIN) IV  100 mg Intravenous Q12H    aspirin  81 mg Oral Daily    lisinopril  10 mg Oral Daily    lamoTRIgine  25 mg Oral Daily    levothyroxine  50 mcg Oral Daily    sodium chloride flush  10 mL Intravenous 2 times per day    levofloxacin  250 mg Intravenous Q24H    gabapentin  300 mg Oral TID    vancomycin  1,250 mg Intravenous Q24H    vancomycin (VANCOCIN) intermittent dosing (placeholder)   Other RX Placeholder    piperacillin-tazobactam  3.375 g Intravenous Q8H    ipratropium-albuterol  1 ampule Inhalation Q4H WA     meperidine, acetaminophen **OR** acetaminophen, polyethylene glycol, melatonin, ondansetron **OR** ondansetron, nitroGLYCERIN, sodium chloride flush, hydrALAZINE, morphine, morphine, naloxone  DIET CLEAR LIQUID; No Caffeine         Lab and other Data:     Recent Labs     06/25/20  0255 06/26/20  0422 06/27/20  0403   WBC 3.5* 3.2* 2.7*   HGB 13.9* 12.9* 13.5*   PLT 80* 65* 59*     Recent Labs     06/25/20  0255 06/25/20  0504 06/26/20  0423 06/27/20  0403     --  134* 134*   K 4.1 4.0 4.1 4.1     --  101 100   CO2 21*  --  22 21*   BUN 15  --  15 17   CREATININE 1.7*  --  1.5* 1.7*   GLUCOSE 96  --  94 107     Recent Labs     06/25/20  0255 06/27/20  0403   AST 49* 221*   ALT 22 95*   BILITOT 0.7 2.0*   ALKPHOS 79 139*     Troponin T:   Recent Labs     06/25/20  1620 06/25/20  1070 06/26/20  0422   TROPONINI <0.01 <0.01 0.02     Pro-BNP: No results for input(s): BNP in the last 72 hours. INR:   Recent Labs     06/25/20  0255   INR 1.24*     ABGs:   Lab Results   Component Value Date    PHART 7.460 06/25/2020    PO2ART 65.0 06/25/2020    UMS9JLL 25.0 06/25/2020     UA:  Recent Labs     06/25/20  1655   COLORU YELLOW   PHUR 6.0   WBCUA 1   RBCUA 37*   BACTERIA NEGATIVE*   CLARITYU Clear   SPECGRAV 1.030   LEUKOCYTESUR Negative   UROBILINOGEN 1.0   BILIRUBINUR Negative   BLOODU LARGE*   GLUCOSEU Negative       Imaging:   CT HEAD WO CONTRAST   Final Result   1. No acute intracranial hemorrhage, midline shift or mass effect. 2. Confluent periventricular and subcortical white matter   hypodensities with volume loss, most commonly chronic microvascular   changes. If clinical concern for acute infarct, MRI would be a more   sensitive exam.   Signed by Dr Dipak Kwong on 6/25/2020 4:22 PM      CTA PULMONARY W CONTRAST   Final Result   Impression:   1. No CT angiographic evidence of pulmonary embolus or acute aortic   pathology. 2. Small bilateral pleural effusions with associated airspace   opacities likely atelectasis. 3. Cardiomegaly with atherosclerotic calcifications. 4. Thyroid enlargement with nodules. 5. Pectus excavatum anterior chest wall deformity. Signed by Dr Jeb Bloom on 6/25/2020 4:26 PM      VL DUP LOWER EXTREMITY VENOUS BILATERAL   Final Result      XR CHEST PORTABLE   Final Result   1. Bilateral infiltrates likely representing pulmonary edema. Pneumonia is possible. 2. There may be small pleural effusions. Heart size is upper limits of   normal.   Signed by Dr Brooke Dey on 6/25/2020 1:23 PM        Micro:   Blood Culture, Routine   Date Value Ref Range Status   06/25/2020   Preliminary    No Growth to date.   Any change in status will be called.   , No components found for: LABURINE  Patient Active Problem List    Diagnosis Date Noted    Symptomatic bradycardia 06/25/2020       Assessment/plan: Active Problems:    Symptomatic bradycardia    fever      Plan:   Possible Tick-borne illness  FEVER unknown source  [facility],  Will get LA  Will place on empiric abx and   ID consulted- Doxy add  Tick borne panel ordered    Elevated Liver enzymes      Symptomatic Bradycardia with NEW RBB and Left Fascicular Blocks - ACS R/O:  Dobutamine GGT for HR>50bpm  Tele  Cardio on board  Trend TnI for 3 more Q3h apart   echo and CXR reviewed  -cardio reccomends no need for pacemaker at this point.     Sepsis likely secondary to bacterial PNA- Gr Neg suspected  -levaquin, Zosyn, Vanco  -cultures pending      NIDHI-  Stable around 1.7  -Nephro consulted consulted    DVT Prophylaxis: heparin    Transfer to pcu tele    Humera Alvarado MD  Hospitalist Service  6/27/2020  8:17 AM

## 2020-06-28 LAB
ALBUMIN SERPL-MCNC: 3.2 G/DL (ref 3.5–5.2)
ALP BLD-CCNC: 157 U/L (ref 40–130)
ALT SERPL-CCNC: 145 U/L (ref 5–41)
AMMONIA: 17 UMOL/L (ref 16–60)
ANION GAP SERPL CALCULATED.3IONS-SCNC: 11 MMOL/L (ref 7–19)
AST SERPL-CCNC: 306 U/L (ref 5–40)
ATYPICAL LYMPHOCYTE RELATIVE PERCENT: 1 % (ref 0–8)
BANDED NEUTROPHILS RELATIVE PERCENT: 1 % (ref 0–5)
BASOPHILS ABSOLUTE: 0 K/UL (ref 0–0.2)
BASOPHILS RELATIVE PERCENT: 0 % (ref 0–1)
BILIRUB SERPL-MCNC: 1.8 MG/DL (ref 0.2–1.2)
BUN BLDV-MCNC: 20 MG/DL (ref 8–23)
BURR CELLS: ABNORMAL
CALCIUM SERPL-MCNC: 9 MG/DL (ref 8.8–10.2)
CHLORIDE BLD-SCNC: 101 MMOL/L (ref 98–111)
CO2: 23 MMOL/L (ref 22–29)
CREAT SERPL-MCNC: 1.8 MG/DL (ref 0.5–1.2)
EOSINOPHILS ABSOLUTE: 0 K/UL (ref 0–0.6)
EOSINOPHILS RELATIVE PERCENT: 0 % (ref 0–5)
GFR NON-AFRICAN AMERICAN: 36
GLUCOSE BLD-MCNC: 103 MG/DL (ref 74–109)
HCT VFR BLD CALC: 37.7 % (ref 42–52)
HEMOGLOBIN: 13.1 G/DL (ref 14–18)
LYMPHOCYTES ABSOLUTE: 2 K/UL (ref 1.1–4.5)
LYMPHOCYTES RELATIVE PERCENT: 46 % (ref 20–40)
MCH RBC QN AUTO: 31.7 PG (ref 27–31)
MCHC RBC AUTO-ENTMCNC: 34.7 G/DL (ref 33–37)
MCV RBC AUTO: 91.3 FL (ref 80–94)
MONOCYTES ABSOLUTE: 0.7 K/UL (ref 0–0.9)
MONOCYTES RELATIVE PERCENT: 16 % (ref 0–10)
NEUTROPHILS ABSOLUTE: 1.6 K/UL (ref 1.5–7.5)
NEUTROPHILS RELATIVE PERCENT: 36 % (ref 50–65)
PDW BLD-RTO: 13.3 % (ref 11.5–14.5)
PLATELET # BLD: 60 K/UL (ref 130–400)
PLATELET SLIDE REVIEW: ABNORMAL
PMV BLD AUTO: 12.9 FL (ref 9.4–12.4)
POTASSIUM REFLEX MAGNESIUM: 3.8 MMOL/L (ref 3.5–5)
POTASSIUM SERPL-SCNC: 3.8 MMOL/L (ref 3.5–5)
RBC # BLD: 4.13 M/UL (ref 4.7–6.1)
SODIUM BLD-SCNC: 135 MMOL/L (ref 136–145)
TOTAL PROTEIN: 5.1 G/DL (ref 6.6–8.7)
TSH SERPL DL<=0.05 MIU/L-ACNC: 3.51 UIU/ML (ref 0.27–4.2)
VITAMIN B-12: >2000 PG/ML (ref 211–946)
WBC # BLD: 4.3 K/UL (ref 4.8–10.8)

## 2020-06-28 PROCEDURE — 6370000000 HC RX 637 (ALT 250 FOR IP): Performed by: HOSPITALIST

## 2020-06-28 PROCEDURE — 84443 ASSAY THYROID STIM HORMONE: CPT

## 2020-06-28 PROCEDURE — 97530 THERAPEUTIC ACTIVITIES: CPT

## 2020-06-28 PROCEDURE — 82140 ASSAY OF AMMONIA: CPT

## 2020-06-28 PROCEDURE — 2580000003 HC RX 258: Performed by: INTERNAL MEDICINE

## 2020-06-28 PROCEDURE — 85027 COMPLETE CBC AUTOMATED: CPT

## 2020-06-28 PROCEDURE — 80074 ACUTE HEPATITIS PANEL: CPT

## 2020-06-28 PROCEDURE — 97162 PT EVAL MOD COMPLEX 30 MIN: CPT

## 2020-06-28 PROCEDURE — 94640 AIRWAY INHALATION TREATMENT: CPT

## 2020-06-28 PROCEDURE — 2500000003 HC RX 250 WO HCPCS: Performed by: INTERNAL MEDICINE

## 2020-06-28 PROCEDURE — 36415 COLL VENOUS BLD VENIPUNCTURE: CPT

## 2020-06-28 PROCEDURE — 80053 COMPREHEN METABOLIC PANEL: CPT

## 2020-06-28 PROCEDURE — 2580000003 HC RX 258: Performed by: HOSPITALIST

## 2020-06-28 PROCEDURE — 2140000000 HC CCU INTERMEDIATE R&B

## 2020-06-28 PROCEDURE — 82607 VITAMIN B-12: CPT

## 2020-06-28 PROCEDURE — 85007 BL SMEAR W/DIFF WBC COUNT: CPT

## 2020-06-28 RX ORDER — UREA 10 %
2 LOTION (ML) TOPICAL NIGHTLY PRN
Status: DISCONTINUED | OUTPATIENT
Start: 2020-06-28 | End: 2020-06-28

## 2020-06-28 RX ORDER — SODIUM CHLORIDE 9 MG/ML
INJECTION, SOLUTION INTRAVENOUS CONTINUOUS
Status: DISCONTINUED | OUTPATIENT
Start: 2020-06-28 | End: 2020-06-28

## 2020-06-28 RX ORDER — LISINOPRIL 20 MG/1
20 TABLET ORAL DAILY
Status: DISCONTINUED | OUTPATIENT
Start: 2020-06-29 | End: 2020-07-03 | Stop reason: HOSPADM

## 2020-06-28 RX ADMIN — LAMOTRIGINE 25 MG: 25 TABLET ORAL at 08:34

## 2020-06-28 RX ADMIN — GABAPENTIN 300 MG: 300 CAPSULE ORAL at 15:06

## 2020-06-28 RX ADMIN — GABAPENTIN 300 MG: 300 CAPSULE ORAL at 20:41

## 2020-06-28 RX ADMIN — LEVOTHYROXINE SODIUM 50 MCG: 50 TABLET ORAL at 06:27

## 2020-06-28 RX ADMIN — SODIUM CHLORIDE, PRESERVATIVE FREE 10 ML: 5 INJECTION INTRAVENOUS at 08:34

## 2020-06-28 RX ADMIN — IPRATROPIUM BROMIDE AND ALBUTEROL SULFATE 1 AMPULE: .5; 3 SOLUTION RESPIRATORY (INHALATION) at 06:29

## 2020-06-28 RX ADMIN — DOXYCYCLINE 100 MG: 100 INJECTION, POWDER, LYOPHILIZED, FOR SOLUTION INTRAVENOUS at 18:16

## 2020-06-28 RX ADMIN — LISINOPRIL 10 MG: 10 TABLET ORAL at 08:34

## 2020-06-28 RX ADMIN — IPRATROPIUM BROMIDE AND ALBUTEROL SULFATE 1 AMPULE: .5; 3 SOLUTION RESPIRATORY (INHALATION) at 10:20

## 2020-06-28 RX ADMIN — IPRATROPIUM BROMIDE AND ALBUTEROL SULFATE 1 AMPULE: .5; 3 SOLUTION RESPIRATORY (INHALATION) at 19:36

## 2020-06-28 RX ADMIN — GABAPENTIN 300 MG: 300 CAPSULE ORAL at 08:34

## 2020-06-28 RX ADMIN — DOXYCYCLINE 100 MG: 100 INJECTION, POWDER, LYOPHILIZED, FOR SOLUTION INTRAVENOUS at 06:27

## 2020-06-28 RX ADMIN — IPRATROPIUM BROMIDE AND ALBUTEROL SULFATE 1 AMPULE: .5; 3 SOLUTION RESPIRATORY (INHALATION) at 14:21

## 2020-06-28 RX ADMIN — ASPIRIN 81 MG 81 MG: 81 TABLET ORAL at 08:34

## 2020-06-28 RX ADMIN — ACETAMINOPHEN 650 MG: 325 TABLET, FILM COATED ORAL at 20:41

## 2020-06-28 RX ADMIN — SODIUM CHLORIDE, PRESERVATIVE FREE 10 ML: 5 INJECTION INTRAVENOUS at 20:41

## 2020-06-28 ASSESSMENT — PAIN SCALES - GENERAL
PAINLEVEL_OUTOF10: 4
PAINLEVEL_OUTOF10: 7

## 2020-06-28 NOTE — PROGRESS NOTES
per chart review, pt lives with wife and appears to have been ind with moiblity prior to this incident  Cognition        Objective          AROM RLE (degrees)  RLE AROM: WFL  AROM LLE (degrees)  LLE AROM : WFL  Strength Other  Other: antigravity bilat le's  Coordination  Rapid Alternating Movements: (Pt noted to be \"shakey\" and had difficulty using utensils for eating bfast)     Bed mobility  Rolling to Left: Moderate assistance  Rolling to Right: Moderate assistance  Supine to Sit: Moderate assistance  Comment: moderate assist 1+1 for safety and due to pt being impulsive  Transfers  Sit to Stand: Minimal Assistance  Stand to sit: Contact guard assistance  Comment: Pt stood in cindy stedy for change of bed linens and then stood at bs without cindy stedy and took two small side steps toward head of bed. pt noted to be shakey and not safe to attempt to ambulate at this time.         Balance  Comments: able to sit eob with mild leaning bw but no lob. stood in cindy steady with [address]  Times per week: 5-7  Plan weeks: 2  Current Treatment Recommendations: Strengthening, Gait Training, Patient/Caregiver Education & Training, Cognitive Reorientation, Balance Training, Cognitive/Perceptual Training, Functional Mobility Training, Positioning, Transfer Training, Safety Education & Training  Plan Comment: PROGRESS TO USE OF RW AND ASSIST OF TWO FOR TF'S AND AMB AS INDICATED  Safety Devices  Type of devices: Call light within reach, Bed alarm in place, Gait belt, Left in bed    G-Code       OutComes Score                                                  AM-PAC Score             Goals  Short term goals  Time Frame for Short term goals: 2 wks  Short term goal 1: SUP<>SIT, SBA  Short term goal 2: SIT<>STAND, CGA  Short term goal 3: STAND STEP TF WITH AD AS INDIATED, CGA  Short term goal 4:  FT WITH RW, CGA       Therapy Time   Individual Concurrent Group Co-treatment   Time In           Time Out Minutes                   Nickie Murrell PT    Electronically signed by Nickie Murrell PT on 6/28/2020 at 9:29 AM

## 2020-06-28 NOTE — PROGRESS NOTES
INFECTIOUS DISEASES PROGRESS NOTE    Patient:  Shawna Valenzuela  YOB: 1940  MRN: [id number]   Admit date: 6/25/2020   Admitting Physician: Roselyn Shane MD  Primary Care Physician: No primary care provider on file. CHIEF COMPLAINT: No appetite    Interval History: Patient was moved to progressive care unit from the intensive care unit yesterday. His son-in-law is at bedside    Patient has a clear liquid diet in front of him but states he has \"no appetite\". He says he will try to eat a little more when his wife Sharlene Lamar gets there later. I was unable to get him to offer any specific complaints. When I asked him if he was a little warm he did say he was and asked if the towel that was up around his neck could be moved. I offered to take the blanket off and just leave him with a sheet and he agreed. His son-in-law asked about his confusion.         Allergies: No Known Allergies    Current Meds: doxycycline (VIBRAMYCIN) 100 mg in dextrose 5 % 100 mL IVPB, Q12H  meperidine (DEMEROL) injection 6.5 mg, Q4H PRN  DOBUTamine (DOBUTREX) 500 mg in dextrose 5 % 250 mL infusion, Continuous  acetaminophen (TYLENOL) tablet 650 mg, Q6H PRN    Or  acetaminophen (TYLENOL) suppository 650 mg, Q6H PRN  aspirin chewable tablet 81 mg, Daily  polyethylene glycol (GLYCOLAX) packet 17 g, Daily PRN  melatonin tablet 3 mg, Nightly PRN  ondansetron (ZOFRAN-ODT) disintegrating tablet 4 mg, Q8H PRN    Or  ondansetron (ZOFRAN) injection 4 mg, Q6H PRN  nitroGLYCERIN (NITROSTAT) SL tablet 0.4 mg, Q5 Min PRN  lisinopril (PRINIVIL;ZESTRIL) tablet 10 mg, Daily  lamoTRIgine (LAMICTAL) tablet 25 mg, Daily  levothyroxine (SYNTHROID) tablet 50 mcg, Daily  sodium chloride flush 0.9 % injection 10 mL, 2 times per day  sodium chloride flush 0.9 % injection 10 mL, PRN  DOPamine (INTROPIN) 400 mg in dextrose 5 % 250 mL infusion, Continuous  gabapentin (NEURONTIN) capsule 300 mg, TID  ipratropium-albuterol (DUONEB) nebulizer solution 1 ampule, Q4H WA  hydrALAZINE (APRESOLINE) injection 10 mg, Q4H PRN  morphine injection 1 mg, Q4H PRN  morphine injection 2 mg, Q4H PRN  naloxone (NARCAN) injection 0.4 mg, PRN        Review of Systems   Constitutional: Positive for fever. Neurological: Positive for weakness. Vital Signs:  BP (!) 168/91   Pulse 85   Temp 97.3 °F (36.3 °C) (Temporal)   Resp 16   Ht 6' (1.829 m)   Wt 175 lb 6.4 oz (79.6 kg)   SpO2 91%   BMI 23.79 kg/m²   Temp (24hrs), Av.2 °F (37.3 °C), Min:97.3 °F (36.3 °C), Max:101.7 °F (38.7 °C)      Physical Exam  General: The patient is an elderly male, alert, sitting up in bed in no acute distress  HEENT: Sclera anicteric and noninjected. No oral pharyngeal lesions  Neck: Without meningismus  Respiratory: Effort even and unlabored  Abdomen: Soft, bowel sounds are positive, nontender  Skin: No new obvious skin changes. Chest appears stable. Neuro: He was alert, conversive, speech was clear. Psych: He was pleasant and cooperative    Line/IV site: No erythema,warmth, induration, or tenderness.   Right upper forearm  LAB RESULTS:    CBC with DIFF:  Recent Labs     20  0422 20  0403 20  0411   WBC 3.2* 2.7* 4.3*   RBC 4.07* 4.28* 4.13*   HGB 12.9* 13.5* 13.1*   HCT 37.6* 39.3* 37.7*   MCV 92.4 91.8 91.3   MCH 31.7* 31.5* 31.7*   MCHC 34.3 34.4 34.7   RDW 12.7 13.1 13.3   PLT 65* 59* 60*   MPV 12.9* 13.8* 12.9*   NEUTOPHILPCT  --  48.0* 36.0*   LYMPHOPCT  --  24.0 46.0*   MONOPCT  --  10.0 16.0*   EOSRELPCT  --  0.0 0.0   BASOPCT  --  2.0* 0.0   NEUTROABS  --  1.5 1.6   LYMPHSABS  --  0.9* 2.0   MONOSABS  --  0.30 0.70   EOSABS  --  0.00 0.00   BASOSABS  --  0.10 0.00       CMP/BMP:  Recent Labs     20  0423 20  0403 20  0411   * 134* 135*   K 4.1 4.1 3.8  3.8    100 101   CO2 22 21* 23   ANIONGAP 11 13 11   GLUCOSE 94 107 103   BUN 15 17 20   CREATININE 1.5* 1.7* 1.8*   LABGLOM 45* 39* 36*   CALCIUM 8.6* 8.9 9.0   PROT  --  5.3* 5.1* LABALBU  --  3.3* 3.2*   BILITOT  --  2.0* 1.8*   ALKPHOS  --  139* 157*   ALT  --  95* 145*   AST  --  221* 306*         Culture Results:    No results for input(s): CXSURG in the last 720 hours. Blood Culture Recent:   Recent Labs     06/25/20  0908   BC No Growth to date. Any change in status will be called. Urine culture no growth to date  [address] and blood culture from June 23- to date, urine culture negative, COVID-19 test negative, influenza a and B screen negative        RADIOLOGY      Ct Head Wo Contrast    Result Date: 6/25/2020  EXAM: CT HEAD WO CONTRAST -- 6/25/2020 2:15 PM HISTORY: 79 years, Male, confusion COMPARISON: None DLP: 805 mGy cm. Automated exposure control was utilized to minimize patient radiation dose. TECHNIQUE: Unenhanced axial CT images obtained from vertex to skull base with coronal and sagittal reformats. FINDINGS: No acute intracranial hemorrhage, midline shift, mass effect or large territory cortical infarct. Confluent periventricular and subcortical white matter hypodensities, most likely due to chronic microvascular disease. Ventricles, sulci, basilar cisterns are proportionally prominent suggesting volume loss. Thinning of the ocular lenses may be postoperative or age-related. Paranasal sinuses and mastoid air cells normally aerated. External auditory canals within normal limits. Calvarium appears intact. Subcutaneous tissues within normal limits. 1. No acute intracranial hemorrhage, midline shift or mass effect. 2. Confluent periventricular and subcortical white matter hypodensities with volume loss, most commonly chronic microvascular changes. If clinical concern for acute infarct, MRI would be a more sensitive exam. Signed by Dr Shazia Galdamez on 6/25/2020 4:22 PM    Xr Chest Portable    Result Date: 6/25/2020  EXAMINATION:  XR CHEST PORTABLE  6/25/2020 1:12 PM HISTORY: Shortness of breath. COMPARISON: No comparison study.  FINDINGS: There are patchy infiltrates in the perihilar regions and lung bases. There is minimal blunting of the costophrenic angles. Heart size is upper limits of normal. Thoracic aorta is atheromatous. 1. Bilateral infiltrates likely representing pulmonary edema. Pneumonia is possible. 2. There may be small pleural effusions. Heart size is upper limits of normal. Signed by Dr Rubén Mann on 6/25/2020 1:23 PM    Vl Dup Lower Extremity Venous Bilateral    Result Date: 6/25/2020  Vascular Lower Extremities DVT Study Procedure  Demographics   Patient Name     Keyla Saez Age                    [de-identified]   Patient Number   [id number]       Gender                 Male   Visit Number     [id number]    Interpreting Physician Han Eaton MD   Date of Birth    1940   Referring Physician    Mary John   Accession Number [id number]   Irene 70, RVT  Procedure Type of Study:   Veins:Lower Extremities DVT Study, VL LOWER EXTREMITY BILATERAL VENOUS  DUPLEX . Technical Quality:Limited visualization due to combative patient. Impression   Unable to complete exam because patient was shaking uncontrollably. Only  right common femoral vein and saphenofemoral junction were able to be  imaged. No DVT/SVT seen in these areas. The remainder of the right leg  could not be imaged and therefore could not \"rule out\" deep or superficial  venous thrombosis. Signature   ----------------------------------------------------------------  Electronically signed by Han Eaton MD(Interpreting  physician) on 06/25/2020 05:28 PM  ---------------------------------------------------------------- ------------------------------------+----------+---------------+----------+    Cta Pulmonary W Contrast    Result Date: 6/25/2020  CTA PULMONARY W CONTRAST 6/25/2020 11:30 AM HISTORY: Elevated d-dimer. Shortness of breath.  Clinical finding is worrisome for pulmonary embolus Comparison: None Technique: CT evaluation of the chest was performed with intravenous contrast. 2 mm transaxial images were obtained. Maximum intensity projection reconstruction angiographic images were generated . Two-dimensional Coronal and sagittal reconstruction images of the pulmonary arteries, aorta and great vessels were also generated. .Radiation dose equals  mGy cm. AUTOMATED EXPOSURE CONTROL dose reduction technique was implemented Findings: There is image quadrant degradation related to breathing motion artifact. Pulmonary arteries opacify with iodinated contrast without intraluminal filling defects or convincing CT angiographic evidence of pulmonary embolus. The aorta is normal in caliber, without aneurysm or dissection. There is ectasia of the aortic arch with aortic calcifications. There is cardiomegaly with coronary artery calcifications. There are calcified left hilar lymph nodes. There is no mediastinal hilar lymphadenopathy. There are small bilateral pleural effusions with lower lobe airspace opacities, suspect atelectasis. There are emphysematous changes in the upper lobes with bleb formation. Evaluation of the lung fields challenging due to motion, however no definite consolidating parenchymal infiltrates identified. Thyroid gland enlargement with nodules. Left-sided nodule measures 2.3 cm. Right-sided nodule measures 1.6 cm. There is no axillary lymph node enlargement. Limited imaging the upper abdomen demonstrate no acute abnormality. There is pectus excavatum anterior chest wall deformity. No acute osseous abnormalities observed. Impression: 1. No CT angiographic evidence of pulmonary embolus or acute aortic pathology. 2. Small bilateral pleural effusions with associated airspace opacities likely atelectasis. 3. Cardiomegaly with atherosclerotic calcifications. 4. Thyroid enlargement with nodules. 5. Pectus excavatum anterior chest wall deformity.  Signed by Dr Sara Murillo on 6/25/2020 4:26 PM                  Patient Active Problem List Diagnosis    Symptomatic bradycardia       IMPRESSION:    1. Fever to almost 105-temperature curve improving. Did spike to 101.7 yesterday evening. No pneumonia on CT angiogram, urine and blood cultures unremarkable at outside hospital as well as at [facility]. No other obvious source on exam.  Working diagnosis is tickborne, most likely Ehrlichia  2. Leukopenia-improving  3.  thrombocytopenia- stable  4. Elevated transaminases-worsening  5. Acute kidney injury with hematuria and proteinuria  6. Recent falls    RECOMMENDATIONS :  · Continue doxycycline   · Monitor CLOSELY with change of abx  · We will start IV fluids as patient is not taking in any significant p.o. Has insensible losses secondary to recent high fevers. · We will need to closely monitor temperature curve as well. Would not expect any more significant high spikes if in fact this is tick related as he is received 4 doses of doxycycline as of this morning.     Discussed with son-in-law in the room  Discussed with Margi LAGOS MD

## 2020-06-28 NOTE — PROGRESS NOTES
[facility]      Patient:  Sandra Munguia  YOB: 1940  Date of Service: 6/28/2020  MRN: [id number]   Acct: [de-identified]   Primary Care Physician: No primary care provider on file. Advance Directive: Full Code  Admit Date: 6/25/2020       Hospital Day: 3    Chief Complaint: No chief complaint on file. Mr. Sandra Munguia is a [de-identified]year old  american gentleman. He was referred to us from the [facility] ED by the EP Dr. Paulina Van who also happens to be his PCP. He states that the patient has no history of bradycardia. The patient was on the way back home from being seen in the PCP's office for a routine checkup. The patient and his wife stopped at the store on the way home, and he had waited in the car. When his wife came back he was unarousable. He was seen and assessed at the ED, and was found to have a heart rate of 34-42bpm. He was referred for cardiac evaluation. [address] ER covid test results negative. Subjective: pt seen and examined; axox3, awake. Appears to be back to baseline. Low grade fevers. Denies any chest pain or sob. No visible rashes noted.   6/28/20  PMH of dementia, HTN, Presented with syncope with bradycardia , Echo good EF, CTA -ve PE, bradycardia resolved, was found to have fever 105, UC BC -ve,COVID-19 -ve  noted to have leukopenia and thrombocytopenia ID on board Working diagnosis is tickborne most likely Ehrlichia, placed on doxy,renal consulted for NIDHI, glomerulonephritis,  Check acute hepatitis panel, advance diet check B12 ammonia TSH   Objective:   VITALS:  BP (!) 168/91   Pulse 85   Temp 97.3 °F (36.3 °C) (Temporal)   Resp 16   Ht 6' (1.829 m)   Wt 175 lb 6.4 oz (79.6 kg)   SpO2 91%   BMI 23.79 kg/m²   24HR INTAKE/OUTPUT:      Intake/Output Summary (Last 24 hours) at 6/28/2020 0846  Last data filed at 6/28/2020 0631  Gross per 24 hour   Intake 430 ml   Output 1325 ml   Net -895 ml     Constitutional: axox3  HENT:    Head: Normocephalic and atraumatic. Mouth/Throat: Oropharynx is clear and moist. No oropharyngeal exudate. Eyes: Conjunctivae and EOM are normal. Pupils are equal, round, and reactive to light. No scleral icterus. Neck: Normal range of motion. Neck supple. No JVD present. No tracheal deviation present. No thyromegaly present. Cardiovascular: s1s2 sarah     Pulmonary/Chest: Effort normal and breath sounds normal. No stridor. No respiratory distress. No wheezes. No rales. Abdominal: Soft. Bowel sounds are normal. No distension and no mass. There is no tenderness. There is no rebound and no guarding. Musculoskeletal: Normal range of motion. There is no edema or tenderness. Extremities: No edema  Neurological: lethargic, confused  Skin: Skin is warm and dry. No rash noted. Not diaphoretic. No erythema. No pallor. Psychiatric: Normal mood and affect.  Behavior is normal. Judgment and thought content normal.     Medications:      DOBUTamine      DOPamine Stopped (06/25/20 1705)      doxycycline (VIBRAMYCIN) IV  100 mg Intravenous Q12H    aspirin  81 mg Oral Daily    lisinopril  10 mg Oral Daily    lamoTRIgine  25 mg Oral Daily    levothyroxine  50 mcg Oral Daily    sodium chloride flush  10 mL Intravenous 2 times per day    gabapentin  300 mg Oral TID    ipratropium-albuterol  1 ampule Inhalation Q4H WA     meperidine, acetaminophen **OR** acetaminophen, polyethylene glycol, melatonin, ondansetron **OR** ondansetron, nitroGLYCERIN, sodium chloride flush, hydrALAZINE, morphine, morphine, naloxone  DIET CLEAR LIQUID; No Caffeine         Lab and other Data:     Recent Labs     06/26/20  0422 06/27/20 0403 06/28/20 0411   WBC 3.2* 2.7* 4.3*   HGB 12.9* 13.5* 13.1*   PLT 65* 59* 60*     Recent Labs     06/26/20 0423 06/27/20  0403 06/28/20  0411   * 134* 135*   K 4.1 4.1 3.8  3.8    100 101   CO2 22 21* 23   BUN 15 17 20   CREATININE 1.5* 1.7* 1.8*   GLUCOSE 94 107 103     Recent Labs 06/27/20  0403 06/28/20  0411   * 306*   ALT 95* 145*   BILITOT 2.0* 1.8*   ALKPHOS 139* 157*     Troponin T:   Recent Labs     06/25/20  1620 06/25/20  2245 06/26/20  0422   TROPONINI <0.01 <0.01 0.02     Pro-BNP: No results for input(s): BNP in the last 72 hours. INR:   No results for input(s): INR in the last 72 hours. ABGs:   Lab Results   Component Value Date    PHART 7.460 06/25/2020    PO2ART 65.0 06/25/2020    SAW2IZS 25.0 06/25/2020     UA:  Recent Labs     06/25/20  1655   COLORU YELLOW   PHUR 6.0   WBCUA 1   RBCUA 37*   BACTERIA NEGATIVE*   CLARITYU Clear   SPECGRAV 1.030   LEUKOCYTESUR Negative   UROBILINOGEN 1.0   BILIRUBINUR Negative   BLOODU LARGE*   GLUCOSEU Negative       Imaging:   US RENAL COMPLETE   Final Result   1. Negative for hydronephrosis or renal atrophy. 2.  2.1 cm cyst in the LEFT kidney without complex features. Signed by Dr Pete Montejo on 6/27/2020 12:05 PM      CT HEAD WO CONTRAST   Final Result   1. No acute intracranial hemorrhage, midline shift or mass effect. 2. Confluent periventricular and subcortical white matter   hypodensities with volume loss, most commonly chronic microvascular   changes. If clinical concern for acute infarct, MRI would be a more   sensitive exam.   Signed by Dr Jeffry Mello on 6/25/2020 4:22 PM      CTA PULMONARY W CONTRAST   Final Result   Impression:   1. No CT angiographic evidence of pulmonary embolus or acute aortic   pathology. 2. Small bilateral pleural effusions with associated airspace   opacities likely atelectasis. 3. Cardiomegaly with atherosclerotic calcifications. 4. Thyroid enlargement with nodules. 5. Pectus excavatum anterior chest wall deformity. Signed by Dr Dara Malagon on 6/25/2020 4:26 PM      VL DUP LOWER EXTREMITY VENOUS BILATERAL   Final Result      XR CHEST PORTABLE   Final Result   1. Bilateral infiltrates likely representing pulmonary edema. Pneumonia is possible.    2. There may be small pleural effusions. Heart size is upper limits of   normal.   Signed by Dr Mary Cabrera on 6/25/2020 1:23 PM        Micro:   Blood Culture, Routine   Date Value Ref Range Status   06/25/2020   Preliminary    No Growth to date. Any change in status will be called.   , No components found for: LABURINE  Patient Active Problem List    Diagnosis Date Noted    Symptomatic bradycardia 06/25/2020       Assessment/plan: Active Problems:    Symptomatic bradycardia    fever      Plan:   Possible Tick-borne illness  FEVER unknown source  covid testing negative at [facility] get blood cx, urine cx,  Will get LA  Will place on empiric abx and   ID consulted- Doxy add  Tick borne panel ordered    Elevated Liver enzymes      Symptomatic Bradycardia with NEW RBB and Left Fascicular Blocks - ACS R/O:  Dobutamine GGT for HR>50bpm  Tele  Cardio on board  Trend TnI for 3 more Q3h apart   echo and CXR reviewed  -cardio reccomends no need for pacemaker at this point.     Sepsis likely secondary to bacterial PNA- Gr Neg suspected  -levaquin, Zosyn, Vanco  -cultures pending      NIDHI-  Stable around 1.7  -Nephro consulted consulted    DVT Prophylaxis: heparin  6/28/20  PMH of dementia, HTN, Presented with syncope with bradycardia , Echo good EF, CTA -ve PE, bradycardia resolved, was found to have fever 105, UC BC -ve,COVID-19 -ve  noted to have leukopenia and thrombocytopenia ID on board Working diagnosis is tickborne most likely Ehrlichia, placed on doxy,renal consulted for NIDHI, glomerulonephritis,  Check acute hepatitis panel, advance diet check B12 ammonia TSH     Song Mathews MD  Hospitalist Service  6/28/2020  8:46 AM

## 2020-06-28 NOTE — PROGRESS NOTES
Nephrology ([facility]) Progress Note      Patient:  Renato Negron  YOB: 1940  Date of Service: 6/28/2020  MRN: [id number]   Acct: [de-identified]   Primary Care Physician: No primary care provider on file. Advance Directive: Full Code  Admit Date: 6/25/2020       Hospital Day: 3  Referring Provider: Julio Cesar Kilgore MD    Patient independently seen and examined, Chart, Consults, Notes, Operative notes, Labs, Cardiology, and Radiology studies reviewed as able. Chief complaint: Abnormal labs. Subjective: Joseph Whittaekr is a [de-identified] y.o. male  whom we were consulted for acute kidney injury. Patient denies any history of chronic kidney disease. Presented to the emergency room as he was brought in by family to the [facility] ER with fever up to 105. In the emergency room he is found to have bradycardia data, increasing creatinine, thrombocytopenia and leukopenia. He is now transferred to [facility] for further evaluation of fever. Apparently he was confused and has encephalopathy which is also resolving slowly. He has history of hypertension, sleep apnea and trigeminal neuralgia. Nephrology is consulted as he has serum creatinine of 1.7 mg.  His urinalysis shows patient has proteinuria and hematuria. He was initially admitted to ICU now transferred back to regular floor. This morning he is feeling much better, more alert awake and answering question appropriately. Allergies:  Patient has no known allergies.     Medicines:  Current Facility-Administered Medications   Medication Dose Route Frequency Provider Last Rate Last Dose    0.9 % sodium chloride infusion   Intravenous Continuous Karla Win MD        doxycycline (VIBRAMYCIN) 100 mg in dextrose 5 % 100 mL IVPB  100 mg Intravenous Q12H Karla Win MD   Stopped at 06/28/20 0736    meperidine (DEMEROL) injection 6.5 mg  6.5 mg Intravenous Q4H PRN Karla Win MD   6.5 mg at 06/27/20 1528    DOBUTamine (DOBUTREX) 500 mg in dextrose 5 % 250 mL infusion  2.5 mcg/kg/min Intravenous Continuous Mihaela Vicente MD        acetaminophen (TYLENOL) tablet 650 mg  650 mg Oral Q6H PRN Mihaela Vicente MD   650 mg at 06/27/20 2156    Or    acetaminophen (TYLENOL) suppository 650 mg  650 mg Rectal Q6H PRN Mihaela Vicente MD   650 mg at 06/25/20 5879    aspirin chewable tablet 81 mg  81 mg Oral Daily Mihaela Vicente MD   81 mg at 06/28/20 0834    polyethylene glycol (GLYCOLAX) packet 17 g  17 g Oral Daily PRN Mihaela Vicente MD        melatonin tablet 3 mg  3 mg Oral Nightly PRN Mihaela Vicente MD        ondansetron (ZOFRAN-ODT) disintegrating tablet 4 mg  4 mg Oral Q8H PRN Mihaela Vicente MD        Or    ondansetron [facility]) injection 4 mg  4 mg Intravenous Q6H PRN Mihaela Vicente MD        nitroGLYCERIN (NITROSTAT) SL tablet 0.4 mg  0.4 mg Sublingual Q5 Min PRN Mihaela Vicente MD        lisinopril (PRINIVIL;ZESTRIL) tablet 10 mg  10 mg Oral Daily Mihaela Vicente MD   10 mg at 06/28/20 0834    lamoTRIgine (LAMICTAL) tablet 25 mg  25 mg Oral Daily Mihaela Vicente MD   25 mg at 06/28/20 0834    levothyroxine (SYNTHROID) tablet 50 mcg  50 mcg Oral Daily Mihaela Vicente MD   50 mcg at 06/28/20 7116    sodium chloride flush 0.9 % injection 10 mL  10 mL Intravenous 2 times per day Mihaela Vicente MD   10 mL at 06/28/20 0834    sodium chloride flush 0.9 % injection 10 mL  10 mL Intravenous PRN Mihaela Vicente MD        DOPamine (INTROPIN) 400 mg in dextrose 5 % 250 mL infusion  2.5 mcg/kg/min Intravenous Continuous Mihaela Vicente MD   Stopped at 06/25/20 1705    gabapentin (NEURONTIN) capsule 300 mg  300 mg Oral TID Mihaela Vicente MD   300 mg at 06/28/20 0834    ipratropium-albuterol (DUONEB) nebulizer solution 1 ampule  1 ampule Inhalation Q4H WA Mihaela Vicente MD   1 ampule at 06/28/20 1020    hydrALAZINE (APRESOLINE) injection 10 mg  10 mg Intravenous Q4H PRN Mihaela Vicente MD   10 mg at 06/27/20 0130    file   Other Topics Concern    Not on file   Social History Narrative    Not on file         Review of Systems:  History obtained from chart review and the patient  General ROS: High-grade fever  Respiratory ROS: No cough, shortness of breath, wheezing  Cardiovascular ROS: No chest pain or palpitations  Gastrointestinal ROS: No abdominal pain or melena  Genito-Urinary ROS: No dysuria or hematuria  Musculoskeletal ROS: No joint pain or swelling   14 point ROS reviewed with the patient and negative except as noted above and in the HPI unless unable to obtain. Objective:  Patient Vitals for the past 24 hrs:   BP Temp Temp src Pulse Resp SpO2   06/28/20 0306 (!) 168/91 97.3 °F (36.3 °C) Temporal 85 16 91 %   06/27/20 2334 -- 98.4 °F (36.9 °C) Temporal -- -- --   06/27/20 2301 134/76 100.5 °F (38.1 °C) -- 92 18 93 %   06/27/20 2028 -- -- -- 109 -- --   06/27/20 2008 117/63 101.7 °F (38.7 °C) Temporal 100 18 91 %   06/27/20 1948 -- -- -- -- -- 92 %   06/27/20 1552 -- -- -- 106 -- --   06/27/20 1551 125/70 98.4 °F (36.9 °C) Temporal 106 14 92 %   06/27/20 1411 -- -- -- -- 14 92 %   06/27/20 1400 (!) 111/94 98.8 °F (37.1 °C) Bladder 81 17 96 %   06/27/20 1300 (!) 112/55 -- -- 89 23 93 %   06/27/20 1200 (!) 114/49 99 °F (37.2 °C) Bladder 78 20 93 %   06/27/20 1100 117/83 -- -- 86 18 92 %       Intake/Output Summary (Last 24 hours) at 6/28/2020 1052  Last data filed at 6/28/2020 0631  Gross per 24 hour   Intake 420 ml   Output 1125 ml   Net -705 ml     General: awake/alert   HEENT: Normocephalic atraumatic head  Neck: Supple no JVD or carotid bruits.   Chest:  clear to auscultation bilaterally without respiratory distress  CVS: regular rate and rhythm  Abdominal: soft, nontender, normal bowel sounds  Extremities: no cyanosis or edema  Skin: warm and dry without rash      Labs:  BMP:   Recent Labs     06/26/20  0423 06/27/20  0403 06/28/20  0411   * 134* 135*   K 4.1 4.1 3.8  3.8    100 101   CO2 22 21* 23 BUN 15 17 20   CREATININE 1.5* 1.7* 1.8*   CALCIUM 8.6* 8.9 9.0     CBC:   Recent Labs     06/26/20  0422 06/27/20  0403 06/28/20 0411   WBC 3.2* 2.7* 4.3*   HGB 12.9* 13.5* 13.1*   HCT 37.6* 39.3* 37.7*   MCV 92.4 91.8 91.3   PLT 65* 59* 60*     LIVER PROFILE:   Recent Labs     06/27/20  0403 06/28/20 0411   * 306*   ALT 95* 145*   BILITOT 2.0* 1.8*   ALKPHOS 139* 157*     PT/INR:   No results for input(s): PROTIME, INR in the last 72 hours. APTT:   No results for input(s): APTT in the last 72 hours. BNP:  No results for input(s): BNP in the last 72 hours. Ionized Calcium:No results for input(s): IONCA in the last 72 hours. Magnesium:  No results for input(s): MG in the last 72 hours. Phosphorus:  No results for input(s): PHOS in the last 72 hours. HgbA1C:   No results for input(s): LABA1C in the last 72 hours. Hepatic:   Recent Labs     06/27/20  0403 06/28/20 0411   ALKPHOS 139* 157*   ALT 95* 145*   * 306*   PROT 5.3* 5.1*   BILITOT 2.0* 1.8*   LABALBU 3.3* 3.2*     Lactic Acid: No results for input(s): LACTA in the last 72 hours. Troponin: No results for input(s): CKTOTAL, CKMB, TROPONINT in the last 72 hours. ABGs: No results for input(s): PH, PCO2, PO2, HCO3, O2SAT in the last 72 hours. CRP:    Recent Labs     06/25/20  1118   CRP 5.28*     Sed Rate:    Recent Labs     06/25/20  1118   SEDRATE 4         Cultures:   No results for input(s): CULTURE in the last 72 hours. No results for input(s): BC, Genia Roldan in the last 72 hours. No results for input(s): CXSURG in the last 72 hours. Radiology reports as per the Radiologist  Radiology: Ct Head Wo Contrast    Result Date: 6/25/2020  EXAM: CT HEAD WO CONTRAST -- 6/25/2020 2:15 PM HISTORY: 79 years, Male, confusion COMPARISON: None DLP: 805 mGy cm. Automated exposure control was utilized to minimize patient radiation dose.  TECHNIQUE: Unenhanced axial CT images obtained from vertex to skull base with coronal and sagittal reformats. FINDINGS: No acute intracranial hemorrhage, midline shift, mass effect or large territory cortical infarct. Confluent periventricular and subcortical white matter hypodensities, most likely due to chronic microvascular disease. Ventricles, sulci, basilar cisterns are proportionally prominent suggesting volume loss. Thinning of the ocular lenses may be postoperative or age-related. Paranasal sinuses and mastoid air cells normally aerated. External auditory canals within normal limits. Calvarium appears intact. Subcutaneous tissues within normal limits. 1. No acute intracranial hemorrhage, midline shift or mass effect. 2. Confluent periventricular and subcortical white matter hypodensities with volume loss, most commonly chronic microvascular changes. If clinical concern for acute infarct, MRI would be a more sensitive exam. Signed by Dr Erik Herbert on 6/25/2020 4:22 PM    Xr Chest Portable    Result Date: 6/25/2020  EXAMINATION:  XR CHEST PORTABLE  6/25/2020 1:12 PM HISTORY: Shortness of breath. COMPARISON: No comparison study. FINDINGS:  There are patchy infiltrates in the perihilar regions and lung bases. There is minimal blunting of the costophrenic angles. Heart size is upper limits of normal. Thoracic aorta is atheromatous. 1. Bilateral infiltrates likely representing pulmonary edema. Pneumonia is possible. 2. There may be small pleural effusions.  Heart size is upper limits of normal. Signed by Dr Christiane Vazquez on 6/25/2020 1:23 PM    Vl Dup Lower Extremity Venous Bilateral    Result Date: 6/25/2020  Vascular Lower Extremities DVT Study Procedure  Demographics   Patient Name     [facility] Age                    [de-identified]   Patient Number   [id number]       Gender                 Male   Visit Number     [id number]    Interpreting Physician Antoinette Alexander MD   Date of Birth    1940   Referring Physician    Noemí Bio   Accession Number [id number]   [address], RVT generated. .Radiation dose equals  mGy cm. AUTOMATED EXPOSURE CONTROL dose reduction technique was implemented Findings: There is image quadrant degradation related to breathing motion artifact. Pulmonary arteries opacify with iodinated contrast without intraluminal filling defects or convincing CT angiographic evidence of pulmonary embolus. The aorta is normal in caliber, without aneurysm or dissection. There is ectasia of the aortic arch with aortic calcifications. There is cardiomegaly with coronary artery calcifications. There are calcified left hilar lymph nodes. There is no mediastinal hilar lymphadenopathy. There are small bilateral pleural effusions with lower lobe airspace opacities, suspect atelectasis. There are emphysematous changes in the upper lobes with bleb formation. Evaluation of the lung fields challenging due to motion, however no definite consolidating parenchymal infiltrates identified. Thyroid gland enlargement with nodules. Left-sided nodule measures 2.3 cm. Right-sided nodule measures 1.6 cm. There is no axillary lymph node enlargement. Limited imaging the upper abdomen demonstrate no acute abnormality. There is pectus excavatum anterior chest wall deformity. No acute osseous abnormalities observed. Impression: 1. No CT angiographic evidence of pulmonary embolus or acute aortic pathology. 2. Small bilateral pleural effusions with associated airspace opacities likely atelectasis. 3. Cardiomegaly with atherosclerotic calcifications. 4. Thyroid enlargement with nodules. 5. Pectus excavatum anterior chest wall deformity. Signed by Dr Amara Ma on 6/25/2020 4:26 PM       Assessment   1. Acute kidney injury/unclear etiology. 2.  Possible glomerulonephritis. 3.  Thrombocytopenia. 4.  Neutropenia. 5.  Hyponatremia.  6.? Ehrlichiosis. 7.  Bradycardia now resolved. Plan:  1. Follow-up on serology. 2.  Increase blood pressure medications.   3.  Renal ultrasound looks normal.  4..  Continue doxycycline.        Hira Lewis MD  06/28/20  10:52 AM

## 2020-06-29 PROBLEM — Z51.5 PALLIATIVE CARE PATIENT: Status: ACTIVE | Noted: 2020-06-26

## 2020-06-29 LAB
ALBUMIN SERPL-MCNC: 3.2 G/DL (ref 3.5–5.2)
ALP BLD-CCNC: 206 U/L (ref 40–130)
ALT SERPL-CCNC: 165 U/L (ref 5–41)
ANION GAP SERPL CALCULATED.3IONS-SCNC: 13 MMOL/L (ref 7–19)
AST SERPL-CCNC: 346 U/L (ref 5–40)
ATYPICAL LYMPHOCYTE RELATIVE PERCENT: 8 % (ref 0–8)
BASOPHILS ABSOLUTE: 0 K/UL (ref 0–0.2)
BASOPHILS RELATIVE PERCENT: 0 % (ref 0–1)
BILIRUB SERPL-MCNC: 1.3 MG/DL (ref 0.2–1.2)
BILIRUBIN URINE: NEGATIVE
BLOOD, URINE: NEGATIVE
BUN BLDV-MCNC: 17 MG/DL (ref 8–23)
BURR CELLS: ABNORMAL
CALCIUM SERPL-MCNC: 9 MG/DL (ref 8.8–10.2)
CHLORIDE BLD-SCNC: 106 MMOL/L (ref 98–111)
CLARITY: CLEAR
CO2: 20 MMOL/L (ref 22–29)
COLOR: YELLOW
CREAT SERPL-MCNC: 1.6 MG/DL (ref 0.5–1.2)
EOSINOPHILS ABSOLUTE: 0 K/UL (ref 0–0.6)
EOSINOPHILS RELATIVE PERCENT: 0 % (ref 0–5)
GFR NON-AFRICAN AMERICAN: 42
GLUCOSE BLD-MCNC: 121 MG/DL (ref 74–109)
GLUCOSE URINE: NEGATIVE MG/DL
HAV IGM SER IA-ACNC: NORMAL
HCT VFR BLD CALC: 39.6 % (ref 42–52)
HEMOGLOBIN: 13.1 G/DL (ref 14–18)
HEPATITIS B CORE IGM ANTIBODY: NORMAL
HEPATITIS B SURFACE ANTIGEN INTERPRETATION: NORMAL
HEPATITIS C ANTIBODY INTERPRETATION: NORMAL
KETONES, URINE: NEGATIVE MG/DL
LEUKOCYTE ESTERASE, URINE: NEGATIVE
LYMPHOCYTES ABSOLUTE: 4 K/UL (ref 1.1–4.5)
LYMPHOCYTES RELATIVE PERCENT: 58 % (ref 20–40)
MCH RBC QN AUTO: 31.5 PG (ref 27–31)
MCHC RBC AUTO-ENTMCNC: 33.1 G/DL (ref 33–37)
MCV RBC AUTO: 95.2 FL (ref 80–94)
MONOCYTES ABSOLUTE: 1 K/UL (ref 0–0.9)
MONOCYTES RELATIVE PERCENT: 17 % (ref 0–10)
NEUTROPHILS ABSOLUTE: 1 K/UL (ref 1.5–7.5)
NEUTROPHILS RELATIVE PERCENT: 17 % (ref 50–65)
NITRITE, URINE: NEGATIVE
PDW BLD-RTO: 13.7 % (ref 11.5–14.5)
PH UA: 6 (ref 5–8)
PLATELET # BLD: 79 K/UL (ref 130–400)
PLATELET SLIDE REVIEW: ABNORMAL
PMV BLD AUTO: 12.7 FL (ref 9.4–12.4)
POTASSIUM REFLEX MAGNESIUM: 3.6 MMOL/L (ref 3.5–5)
POTASSIUM SERPL-SCNC: 3.6 MMOL/L (ref 3.5–5)
PROTEIN UA: NEGATIVE MG/DL
RBC # BLD: 4.16 M/UL (ref 4.7–6.1)
SODIUM BLD-SCNC: 139 MMOL/L (ref 136–145)
SPECIFIC GRAVITY UA: 1.01 (ref 1–1.03)
TOTAL PROTEIN: 5.2 G/DL (ref 6.6–8.7)
UROBILINOGEN, URINE: 1 E.U./DL
WBC # BLD: 6.1 K/UL (ref 4.8–10.8)

## 2020-06-29 PROCEDURE — 94640 AIRWAY INHALATION TREATMENT: CPT

## 2020-06-29 PROCEDURE — 2140000000 HC CCU INTERMEDIATE R&B

## 2020-06-29 PROCEDURE — 6370000000 HC RX 637 (ALT 250 FOR IP): Performed by: HOSPITALIST

## 2020-06-29 PROCEDURE — 99222 1ST HOSP IP/OBS MODERATE 55: CPT | Performed by: NURSE PRACTITIONER

## 2020-06-29 PROCEDURE — 85027 COMPLETE CBC AUTOMATED: CPT

## 2020-06-29 PROCEDURE — 80053 COMPREHEN METABOLIC PANEL: CPT

## 2020-06-29 PROCEDURE — 6370000000 HC RX 637 (ALT 250 FOR IP): Performed by: NURSE PRACTITIONER

## 2020-06-29 PROCEDURE — 97110 THERAPEUTIC EXERCISES: CPT

## 2020-06-29 PROCEDURE — 85007 BL SMEAR W/DIFF WBC COUNT: CPT

## 2020-06-29 PROCEDURE — 81003 URINALYSIS AUTO W/O SCOPE: CPT

## 2020-06-29 PROCEDURE — 36415 COLL VENOUS BLD VENIPUNCTURE: CPT

## 2020-06-29 PROCEDURE — 2580000003 HC RX 258: Performed by: INTERNAL MEDICINE

## 2020-06-29 PROCEDURE — 97530 THERAPEUTIC ACTIVITIES: CPT

## 2020-06-29 PROCEDURE — 2500000003 HC RX 250 WO HCPCS: Performed by: INTERNAL MEDICINE

## 2020-06-29 PROCEDURE — 6370000000 HC RX 637 (ALT 250 FOR IP): Performed by: INTERNAL MEDICINE

## 2020-06-29 PROCEDURE — 86645 CMV ANTIBODY IGM: CPT

## 2020-06-29 PROCEDURE — 86644 CMV ANTIBODY: CPT

## 2020-06-29 RX ORDER — LANOLIN ALCOHOL/MO/W.PET/CERES
3 CREAM (GRAM) TOPICAL NIGHTLY
Status: DISCONTINUED | OUTPATIENT
Start: 2020-06-29 | End: 2020-07-03 | Stop reason: HOSPADM

## 2020-06-29 RX ORDER — ACETAMINOPHEN 325 MG/1
650 TABLET ORAL EVERY 4 HOURS PRN
Status: DISCONTINUED | OUTPATIENT
Start: 2020-06-29 | End: 2020-07-03 | Stop reason: HOSPADM

## 2020-06-29 RX ORDER — SODIUM CHLORIDE 9 MG/ML
INJECTION, SOLUTION INTRAVENOUS CONTINUOUS
Status: DISCONTINUED | OUTPATIENT
Start: 2020-06-29 | End: 2020-07-01

## 2020-06-29 RX ORDER — HYDROXYZINE HYDROCHLORIDE 25 MG/1
12.5 TABLET, FILM COATED ORAL EVERY 6 HOURS PRN
Status: DISCONTINUED | OUTPATIENT
Start: 2020-06-29 | End: 2020-07-03 | Stop reason: HOSPADM

## 2020-06-29 RX ADMIN — LISINOPRIL 20 MG: 20 TABLET ORAL at 08:24

## 2020-06-29 RX ADMIN — LAMOTRIGINE 25 MG: 25 TABLET ORAL at 08:24

## 2020-06-29 RX ADMIN — IPRATROPIUM BROMIDE AND ALBUTEROL SULFATE 1 AMPULE: .5; 3 SOLUTION RESPIRATORY (INHALATION) at 14:14

## 2020-06-29 RX ADMIN — GABAPENTIN 300 MG: 300 CAPSULE ORAL at 15:36

## 2020-06-29 RX ADMIN — ASPIRIN 81 MG 81 MG: 81 TABLET ORAL at 08:24

## 2020-06-29 RX ADMIN — GABAPENTIN 300 MG: 300 CAPSULE ORAL at 20:07

## 2020-06-29 RX ADMIN — DOXYCYCLINE 100 MG: 100 INJECTION, POWDER, LYOPHILIZED, FOR SOLUTION INTRAVENOUS at 07:38

## 2020-06-29 RX ADMIN — HYDROXYZINE HYDROCHLORIDE 12.5 MG: 25 TABLET, FILM COATED ORAL at 17:39

## 2020-06-29 RX ADMIN — SODIUM CHLORIDE: 9 INJECTION, SOLUTION INTRAVENOUS at 09:00

## 2020-06-29 RX ADMIN — IPRATROPIUM BROMIDE AND ALBUTEROL SULFATE 1 AMPULE: .5; 3 SOLUTION RESPIRATORY (INHALATION) at 06:27

## 2020-06-29 RX ADMIN — DOXYCYCLINE 100 MG: 100 INJECTION, POWDER, LYOPHILIZED, FOR SOLUTION INTRAVENOUS at 17:40

## 2020-06-29 RX ADMIN — MELATONIN 3 MG: at 20:07

## 2020-06-29 RX ADMIN — IPRATROPIUM BROMIDE AND ALBUTEROL SULFATE 1 AMPULE: .5; 3 SOLUTION RESPIRATORY (INHALATION) at 10:15

## 2020-06-29 RX ADMIN — LEVOTHYROXINE SODIUM 50 MCG: 50 TABLET ORAL at 08:26

## 2020-06-29 RX ADMIN — GABAPENTIN 300 MG: 300 CAPSULE ORAL at 08:24

## 2020-06-29 NOTE — CARE COORDINATION
Spoke with spouse and pt's dtr present at bedside who report intentions of pt returning home with the spouse and family support for managing pt's care needs. Spouse states an adult newphew is able to come assist throughout the day for all the lifting/transport needs of the pt. Spouse denies any in-home services pta, [facility] and is agreeable to services upon dc. Spouse states having a cane and RW pta and could possibly benefit from a transport chair. Pt not currently on O2 and spouse states no home O2 pta, but that pt once was prescribed a cpap for sleep apnea but could not use and returned the DME. Spouse states \"just do not want to put in him the nursing home\" and SW suggested other options such as [address] [place] [address] unit if completely opposing SNF placement. Spouse seemed to consider the swing bed more than a SNF. Spouse is agreeable to assisting with home IV Abx if necessary upon dc. SW will continue to follow and assist with further dc needs as identified.

## 2020-06-29 NOTE — PROGRESS NOTES
consulted for NIDHI, glomerulonephritis,  Check acute hepatitis panel, advance diet check B12 -ve  Ammonia-ve TSH -ve   Objective:   VITALS:  BP (!) 147/73   Pulse 71   Temp 97.3 °F (36.3 °C) (Temporal)   Resp 20   Ht 6' (1.829 m)   Wt 172 lb 1.6 oz (78.1 kg)   SpO2 93%   BMI 23.34 kg/m²   24HR INTAKE/OUTPUT:      Intake/Output Summary (Last 24 hours) at 6/29/2020 5427  Last data filed at 6/29/2020 0442  Gross per 24 hour   Intake --   Output 400 ml   Net -400 ml     Constitutional: axox3  HENT:    Head: Normocephalic and atraumatic. Mouth/Throat: Oropharynx is clear and moist. No oropharyngeal exudate. Eyes: Conjunctivae and EOM are normal. Pupils are equal, round, and reactive to light. No scleral icterus. Neck: Normal range of motion. Neck supple. No JVD present. No tracheal deviation present. No thyromegaly present. Cardiovascular: s1s2 sarah     Pulmonary/Chest: Effort normal and breath sounds normal. No stridor. No respiratory distress. No wheezes. No rales. Abdominal: Soft. Bowel sounds are normal. No distension and no mass. There is no tenderness. There is no rebound and no guarding. Musculoskeletal: Normal range of motion. There is no edema or tenderness. Extremities: No edema  Neurological: lethargic, confused  Skin: Skin is warm and dry. No rash noted. Not diaphoretic. No erythema. No pallor. Psychiatric: Normal mood and affect.  Behavior is normal. Judgment and thought content normal.     Medications:        lisinopril  20 mg Oral Daily    doxycycline (VIBRAMYCIN) IV  100 mg Intravenous Q12H    aspirin  81 mg Oral Daily    lamoTRIgine  25 mg Oral Daily    levothyroxine  50 mcg Oral Daily    sodium chloride flush  10 mL Intravenous 2 times per day    gabapentin  300 mg Oral TID    ipratropium-albuterol  1 ampule Inhalation Q4H WA     meperidine, acetaminophen **OR** acetaminophen, polyethylene glycol, melatonin, ondansetron **OR** ondansetron, nitroGLYCERIN, sodium chloride flush, hydrALAZINE, morphine, morphine, naloxone  DIET CARDIAC;         Lab and other Data:     Recent Labs     06/27/20  0403 06/28/20  0411 06/29/20  0334   WBC 2.7* 4.3* 6.1   HGB 13.5* 13.1* 13.1*   PLT 59* 60* 79*     Recent Labs     06/27/20  0403 06/28/20  0411 06/29/20  0334   * 135* 139   K 4.1 3.8  3.8 3.6  3.6    101 106   CO2 21* 23 20*   BUN 17 20 17   CREATININE 1.7* 1.8* 1.6*   GLUCOSE 107 103 121*     Recent Labs     06/27/20  0403 06/28/20  0411 06/29/20  0334   * 306* 346*   ALT 95* 145* 165*   BILITOT 2.0* 1.8* 1.3*   ALKPHOS 139* 157* 206*     Troponin T:   No results for input(s): TROPONINI in the last 72 hours. Pro-BNP: No results for input(s): BNP in the last 72 hours. INR:   No results for input(s): INR in the last 72 hours. ABGs:   Lab Results   Component Value Date    PHART 7.460 06/25/2020    PO2ART 65.0 06/25/2020    GWZ9MMH 25.0 06/25/2020     UA:  No results for input(s): NITRITE, COLORU, PHUR, LABCAST, WBCUA, RBCUA, MUCUS, TRICHOMONAS, YEAST, BACTERIA, CLARITYU, SPECGRAV, LEUKOCYTESUR, UROBILINOGEN, BILIRUBINUR, BLOODU, GLUCOSEU, AMORPHOUS in the last 72 hours. Invalid input(s): KETONESU    Imaging:   US RENAL COMPLETE   Final Result   1. Negative for hydronephrosis or renal atrophy. 2.  2.1 cm cyst in the LEFT kidney without complex features. Signed by Dr Amber Issa on 6/27/2020 12:05 PM      CT HEAD WO CONTRAST   Final Result   1. No acute intracranial hemorrhage, midline shift or mass effect. 2. Confluent periventricular and subcortical white matter   hypodensities with volume loss, most commonly chronic microvascular   changes. If clinical concern for acute infarct, MRI would be a more   sensitive exam.   Signed by Dr Alvaro Menard on 6/25/2020 4:22 PM      CTA PULMONARY W CONTRAST   Final Result   Impression:   1. No CT angiographic evidence of pulmonary embolus or acute aortic   pathology.    2. Small bilateral pleural effusions with associated airspace   opacities likely atelectasis. 3. Cardiomegaly with atherosclerotic calcifications. 4. Thyroid enlargement with nodules. 5. Pectus excavatum anterior chest wall deformity. Signed by Dr Saint Grimmer on 6/25/2020 4:26 PM      VL DUP LOWER EXTREMITY VENOUS BILATERAL   Final Result      XR CHEST PORTABLE   Final Result   1. Bilateral infiltrates likely representing pulmonary edema. Pneumonia is possible. 2. There may be small pleural effusions. Heart size is upper limits of   normal.   Signed by Dr Haley Quijano on 6/25/2020 1:23 PM        Micro:   Blood Culture, Routine   Date Value Ref Range Status   06/25/2020   Preliminary    No Growth to date. Any change in status will be called.   , No components found for: LABURINE  Patient Active Problem List    Diagnosis Date Noted    Symptomatic bradycardia 06/25/2020       Assessment/plan: Active Problems:    Symptomatic bradycardia    fever      Plan:   Possible Tick-borne illness  FEVER unknown source  covid testing negative at [facility] get blood cx, urine cx,  Will get LA  Will place on empiric abx and   ID consulted- Doxy add  Tick borne panel ordered    Elevated Liver enzymes      Symptomatic Bradycardia with NEW RBB and Left Fascicular Blocks - ACS R/O:  Dobutamine GGT for HR>50bpm  Tele  Cardio on board  Trend TnI for 3 more Q3h apart   echo and CXR reviewed  -cardio reccomends no need for pacemaker at this point.     Sepsis likely secondary to bacterial PNA- Gr Neg suspected  -levaquin, Zosyn, Vanco  -cultures pending      NIDHI-  Stable around 1.7  -Nephro consulted consulted    DVT Prophylaxis: heparin  6/28/20  PMH of dementia, HTN, Presented with syncope with bradycardia , Echo good EF, CTA -ve PE, bradycardia resolved, was found to have fever 105, UC BC -ve,COVID-19 -ve  noted to have leukopenia and thrombocytopenia ID on board Working diagnosis is tickborne most likely Ehrlichia, placed on doxy,renal consulted for NIDHI,

## 2020-06-29 NOTE — PROGRESS NOTES
Nephrology ([facility]) Progress Note    Patient:  Odilia Brewer  YOB: 1940  Date of Service: 6/29/2020  MRN: [id number]   Acct: [de-identified]   Primary Care Physician: No primary care provider on file. Advance Directive: Full Code  Admit Date: 6/25/2020       Hospital Day: 4  Referring Provider: Wicho Degroot MD    Patient independently seen and examined, Chart, Consults, Notes, Operative notes, Labs, Cardiology, and Radiology studies reviewed as able. Subjective: Joseph De Jesus is a [de-identified] y.o. male  whom we were consulted for acute kidney injury. Patient denied any history of chronic kidney disease. Presented to the emergency room as he was brought in by family to the [facility] ER with fever up to 105. In the emergency room he was found to have bradycardia, increasing creatinine, thrombocytopenia and leukopenia. He was transferred to [facility] for further evaluation of fever. Apparently he was confused and had encephalopathy which was resolving slowly. He has a history of hypertension, sleep apnea and trigeminal neuralgia. Nephrology is consulted as he has serum creatinine of 1.7. His urinalysis showed patient has proteinuria and hematuria. He was initially admitted to ICU now transferred back to regular floor.     Today, no overnight events. Denied chest pain, shortness of air, nausea or vomiting.       Allergies:  Patient has no known allergies.     Medicines:  Current Facility-Administered Medications   Medication Dose Route Frequency Provider Last Rate Last Dose    acetaminophen (TYLENOL) tablet 650 mg  650 mg Oral Q4H PRN Ronel Morris MD        0.9 % sodium chloride infusion   Intravenous Continuous Ronel Morris  mL/hr at 06/29/20 0900      melatonin tablet 3 mg  3 mg Oral Nightly Wicho Degroot MD        lisinopril (PRINIVIL;ZESTRIL) tablet 20 mg  20 mg Oral Daily Navjot Christina MD   20 mg at 06/29/20 0824    doxycycline (VIBRAMYCIN) 100 mg in dextrose 5 % 100 mL IVPB  100 mg Intravenous Q12H Kenna Munguia MD   Stopped at 06/29/20 6910    aspirin chewable tablet 81 mg  81 mg Oral Daily Ousmane Hair MD   81 mg at 06/29/20 0824    polyethylene glycol (GLYCOLAX) packet 17 g  17 g Oral Daily PRN Ousmane Hair MD        ondansetron (ZOFRAN-ODT) disintegrating tablet 4 mg  4 mg Oral Q8H PRN Ousmane Hair MD        Or    [facility]ron [facility]) injection 4 mg  4 mg Intravenous Q6H PRN Ousmane Hair MD        nitroGLYCERIN (NITROSTAT) SL tablet 0.4 mg  0.4 mg Sublingual Q5 Min PRN Ousmane Hair MD        lamoTRIgine (LAMICTAL) tablet 25 mg  25 mg Oral Daily Ousmane Hair MD   25 mg at 06/29/20 9712    levothyroxine (SYNTHROID) tablet 50 mcg  50 mcg Oral Daily Ousmane Hair MD   50 mcg at 06/29/20 4184    sodium chloride flush 0.9 % injection 10 mL  10 mL Intravenous 2 times per day Ousmane Hair MD   10 mL at 06/28/20 2041    sodium chloride flush 0.9 % injection 10 mL  10 mL Intravenous PRN Ousmane Hair MD        gabapentin (NEURONTIN) capsule 300 mg  300 mg Oral TID Ousmane Hair MD   300 mg at 06/29/20 0824    ipratropium-albuterol (DUONEB) nebulizer solution 1 ampule  1 ampule Inhalation Q4H WA Ousmane Hair MD   1 ampule at 06/29/20 1015    hydrALAZINE (APRESOLINE) injection 10 mg  10 mg Intravenous Q4H PRN Ousmane Hair MD   10 mg at 06/27/20 0130    morphine injection 1 mg  1 mg Intravenous Q4H PRN Ousmane Hair MD   1 mg at 06/26/20 0211    morphine injection 2 mg  2 mg Intravenous Q4H PRN Ousmane Hair MD   2 mg at 06/27/20 0208    naloxone (NARCAN) injection 0.4 mg  0.4 mg Intravenous PRN Ousmane Hair MD           Past Medical History:  Past Medical History:   Diagnosis Date    Hypertension     Palliative care patient 06/26/2020    Sleep apnea     Thyroid disease     Trigeminal neuropathy        Past Surgical History:  Past Surgical History:   Procedure Laterality Date    CHOLECYSTECTOMY  HERNIA REPAIR         Family History  No family history on file.     Social History  Social History     Socioeconomic History    Marital status:      Spouse name: Hilaria Pascal    Number of children: 2    Years of education: Not on file    Highest education level: Not on file   Occupational History    Not on file   Social Needs    Financial resource strain: Not on file    Food insecurity     Worry: Not on file     Inability: Not on file    Transportation needs     Medical: Not on file     Non-medical: Not on file   Tobacco Use    Smoking status: Former Smoker     Types: Cigarettes    Smokeless tobacco: Never Used   Substance and Sexual Activity    Alcohol use: Not on file    Drug use: Never    Sexual activity: Not Currently     Partners: Male   Lifestyle    Physical activity     Days per week: Not on file     Minutes per session: Not on file    Stress: Not on file   Relationships    Social connections     Talks on phone: Not on file     Gets together: Not on file     Attends Faith service: Not on file     Active member of club or organization: Not on file     Attends meetings of clubs or organizations: Not on file     Relationship status: Not on file    Intimate partner violence     Fear of current or ex partner: Not on file     Emotionally abused: Not on file     Physically abused: Not on file     Forced sexual activity: Not on file   Other Topics Concern    Not on file   Social History Narrative    Not on file         Review of Systems:  History obtained from chart review and the patient  General ROS: + fever or chills  Respiratory ROS: No cough, shortness of breath, wheezing  Cardiovascular ROS: No chest pain or palpitations  Gastrointestinal ROS: No abdominal pain or melena  Genito-Urinary ROS: No dysuria or hematuria  Musculoskeletal ROS: No joint pain or swelling         Objective:  Patient Vitals for the past 24 hrs:   BP Temp Temp src Pulse Resp SpO2 Weight   06/29/20 1010 (!) 150/74 97.8 °F (36.6 °C) Temporal 75 18 93 % --   06/29/20 0631 (!) 147/73 97.3 °F (36.3 °C) Temporal 71 20 93 % --   06/29/20 0627 -- -- -- -- 18 94 % --   06/29/20 0541 -- 99.5 °F (37.5 °C) Temporal -- -- -- --   06/29/20 0430 -- -- -- -- -- -- 172 lb 1.6 oz (78.1 kg)   06/29/20 0316 (!) 166/78 100.3 °F (37.9 °C) Temporal 85 20 94 % --   06/28/20 2154 (!) 141/79 98.8 °F (37.1 °C) Temporal 102 -- 90 % --   06/28/20 2015 -- 99 °F (37.2 °C) Temporal -- -- -- --   06/28/20 2011 -- -- -- 90 -- -- --   06/1940 -- -- -- -- -- 94 % --   06/28/20 1902 (!) 146/77 98.8 °F (37.1 °C) Temporal 84 18 90 % --       Intake/Output Summary (Last 24 hours) at 6/29/2020 1211  Last data filed at 6/29/2020 0442  Gross per 24 hour   Intake --   Output 400 ml   Net -400 ml     General: awake/alert   Chest:  clear to auscultation bilaterally without respiratory distress  CVS: regular rate and rhythm  Abdominal: soft, nontender, normal bowel sounds  Extremities: no cyanosis or edema  Skin: warm/dry    Labs:  BMP:   Recent Labs     06/27/20  0403 06/28/20  0411 06/29/20  0334   * 135* 139   K 4.1 3.8  3.8 3.6  3.6    101 106   CO2 21* 23 20*   BUN 17 20 17   CREATININE 1.7* 1.8* 1.6*   CALCIUM 8.9 9.0 9.0     CBC:   Recent Labs     06/27/20  0403 06/28/20  0411 06/29/20  0334   WBC 2.7* 4.3* 6.1   HGB 13.5* 13.1* 13.1*   HCT 39.3* 37.7* 39.6*   MCV 91.8 91.3 95.2*   PLT 59* 60* 79*     LIVER PROFILE:   Recent Labs     06/27/20  0403 06/28/20  0411 06/29/20  0334   * 306* 346*   ALT 95* 145* 165*   BILITOT 2.0* 1.8* 1.3*   ALKPHOS 139* 157* 206*     PT/INR: No results for input(s): PROTIME, INR in the last 72 hours. APTT: No results for input(s): APTT in the last 72 hours. BNP:  No results for input(s): BNP in the last 72 hours. Ionized Calcium:No results for input(s): IONCA in the last 72 hours. Magnesium:No results for input(s): MG in the last 72 hours.   Phosphorus:No results for input(s): PHOS in the last mass effect. 2. Confluent periventricular and subcortical white matter hypodensities with volume loss, most commonly chronic microvascular changes. If clinical concern for acute infarct, MRI would be a more sensitive exam. Signed by Dr Ananya Andersen on 6/25/2020 4:22 PM    Us Renal Complete    Result Date: 6/27/2020  US RENAL COMPLETE - 6/27/2020 9:30 AM Indication: Acute kidney injury Comparison: None available. Findings: RIGHT kidney measures 9 cm in length. RIGHT renal cortical thickness and echogenicity are within normal limits. No shadowing calculi or hydronephrosis. LEFT kidney measures 9.5 cm in length. LEFT renal cortical thickness and echogenicity are within normal limits. 2.1 cm cyst in the LEFT kidney upper pole without evidence of internal vascularity or complex features. No urolithiasis or hydronephrosis. Urinary bladder is decompressed by Rojas catheter. 1.  Negative for hydronephrosis or renal atrophy. 2.  2.1 cm cyst in the LEFT kidney without complex features. Signed by Dr Dory Colon on 6/27/2020 12:05 PM    Xr Chest Portable    Result Date: 6/25/2020  EXAMINATION:  XR CHEST PORTABLE  6/25/2020 1:12 PM HISTORY: Shortness of breath. COMPARISON: No comparison study. FINDINGS:  There are patchy infiltrates in the perihilar regions and lung bases. There is minimal blunting of the costophrenic angles. Heart size is upper limits of normal. Thoracic aorta is atheromatous. 1. Bilateral infiltrates likely representing pulmonary edema. Pneumonia is possible. 2. There may be small pleural effusions.  Heart size is upper limits of normal. Signed by Dr Zandra Goltz on 6/25/2020 1:23 PM    Vl Dup Lower Extremity Venous Bilateral    Result Date: 6/25/2020  Vascular Lower Extremities DVT Study Procedure  Demographics   Patient Name     [place] Age                    [de-identified]   Patient Number   [id number]       Gender                 Male   Visit Number     [id number]    Interpreting Physician Lidya Lucero MD Date of Birth    1940   Referring Physician    Pretty Beckwith   Accession Number [id number]   [address], RVT  Procedure Type of Study:   Veins:Lower Extremities DVT Study, VL LOWER EXTREMITY BILATERAL VENOUS  DUPLEX . Technical Quality:Limited visualization due to combative patient. Impression   Unable to complete exam because patient was shaking uncontrollably. Only  right common femoral vein and saphenofemoral junction were able to be  imaged. No DVT/SVT seen in these areas. The remainder of the right leg  could not be imaged and therefore could not \"rule out\" deep or superficial  venous thrombosis. Signature   ----------------------------------------------------------------  Electronically signed by Romana Beery MD(Interpreting  physician) on 06/25/2020 05:28 PM  ----------------------------------------------------------------  Velocities are measured in cm/s ; Diameters are measured in mm Right Lower Extremities DVT Study Measurements Right 2D Measurements +------------------------------------+----------+---------------+----------+ ! Location                            ! Visualized! Compressibility! Thrombosis! +------------------------------------+----------+---------------+----------+ ! Sapheno Femoral Junction            ! Yes       ! Yes            ! None      ! +------------------------------------+----------+---------------+----------+ ! Common Femoral                      !Yes       ! Yes            ! None      ! +------------------------------------+----------+---------------+----------+    Cta Pulmonary W Contrast    Result Date: 6/25/2020  CTA PULMONARY W CONTRAST 6/25/2020 11:30 AM HISTORY: Elevated d-dimer. Shortness of breath. Clinical finding is worrisome for pulmonary embolus Comparison: None Technique: CT evaluation of the chest was performed with intravenous contrast. 2 mm transaxial images were obtained.  Maximum intensity projection reconstruction angiographic images were generated . Two-dimensional Coronal and sagittal reconstruction images of the pulmonary arteries, aorta and great vessels were also generated. .Radiation dose equals  mGy cm. AUTOMATED EXPOSURE CONTROL dose reduction technique was implemented Findings: There is image quadrant degradation related to breathing motion artifact. Pulmonary arteries opacify with iodinated contrast without intraluminal filling defects or convincing CT angiographic evidence of pulmonary embolus. The aorta is normal in caliber, without aneurysm or dissection. There is ectasia of the aortic arch with aortic calcifications. There is cardiomegaly with coronary artery calcifications. There are calcified left hilar lymph nodes. There is no mediastinal hilar lymphadenopathy. There are small bilateral pleural effusions with lower lobe airspace opacities, suspect atelectasis. There are emphysematous changes in the upper lobes with bleb formation. Evaluation of the lung fields challenging due to motion, however no definite consolidating parenchymal infiltrates identified. Thyroid gland enlargement with nodules. Left-sided nodule measures 2.3 cm. Right-sided nodule measures 1.6 cm. There is no axillary lymph node enlargement. Limited imaging the upper abdomen demonstrate no acute abnormality. There is pectus excavatum anterior chest wall deformity. No acute osseous abnormalities observed. Impression: 1. No CT angiographic evidence of pulmonary embolus or acute aortic pathology. 2. Small bilateral pleural effusions with associated airspace opacities likely atelectasis. 3. Cardiomegaly with atherosclerotic calcifications. 4. Thyroid enlargement with nodules. 5. Pectus excavatum anterior chest wall deformity.  Signed by Dr Jaylan Dodson on 6/25/2020 4:26 PM       Assessment   Acute kidney injury-uncertain baseline  Possible ehrlichiosis  Metabolic acidosis  Hypertension  Leukopenia/neutropenia  Thrombocytopenia  Elevated transaminases  Microscopic hematuria    Plan:  Antibiotics per ID service  Monitor blood pressure trends  Monitor labs  Continue IV fluids  Repeat urinalysis      Meryle Renshaw, MD  06/29/20  12:11 PM

## 2020-06-29 NOTE — PROGRESS NOTES
SpO2 93%   BMI 23.34 kg/m²   Temp (24hrs), Av °F (37.2 °C), Min:97.3 °F (36.3 °C), Max:100.3 °F (37.9 °C)      Physical Exam  General: The patient is an elderly male, alert, sitting up in bed in no acute distress. His wife is at bedside feeding him. HEENT: Sclera anicteric   Neck: Without meningismus  Respiratory: Effort even and unlabored  Abdomen: Soft, bowel sounds are positive, nontender  Skin: Chest still with erythematous papules however it appears somewhat improved. There is no surrounding erythema of the skin. Did not appreciate any rash on the rest of the chest, abdomen or legs. Neuro: He was alert, conversive, speech was clear. Psych: He was pleasant and cooperative    Line/IV site: No erythema,warmth, induration, or tenderness. Right upper forearm    LAB RESULTS:    CBC with DIFF:  Recent Labs     20   WBC 2.7* 4.3* 6.1   RBC 4.28* 4.13* 4.16*   HGB 13.5* 13.1* 13.1*   HCT 39.3* 37.7* 39.6*   MCV 91.8 91.3 95.2*   MCH 31.5* 31.7* 31.5*   MCHC 34.4 34.7 33.1   RDW 13.1 13.3 13.7   PLT 59* 60* 79*   MPV 13.8* 12.9* 12.7*   NEUTOPHILPCT 48.0* 36.0* 17.0*   LYMPHOPCT 24.0 46.0* 58.0*   MONOPCT 10.0 16.0* 17.0*   EOSRELPCT 0.0 0.0 0.0   BASOPCT 2.0* 0.0 0.0   NEUTROABS 1.5 1.6 1.0*   LYMPHSABS 0.9* 2.0 4.0   MONOSABS 0.30 0.70 1.00*   EOSABS 0.00 0.00 0.00   BASOSABS 0.10 0.00 0.00       CMP/BMP:  Recent Labs     20  0334   * 135* 139   K 4.1 3.8  3.8 3.6  3.6    101 106   CO2 21* 23 20*   ANIONGAP 13 11 13   GLUCOSE 107 103 121*   BUN 17 20 17   CREATININE 1.7* 1.8* 1.6*   LABGLOM 39* 36* 42*   CALCIUM 8.9 9.0 9.0   PROT 5.3* 5.1* 5.2*   LABALBU 3.3* 3.2* 3.2*   BILITOT 2.0* 1.8* 1.3*   ALKPHOS 139* 157* 206*   ALT 95* 145* 165*   * 306* 346*     Results for Myles Hanley (MRN [id number]) as of 2020 08:55   Ref.  Range 2020 02:55 2020 04:03 2020 04:11 2020 03:34   AST Latest Ref Range: 5 - 40 U/L 49 (H) 221 (H) 306 (H) 346 (H)       Culture Results:    No results for input(s): CXSURG in the last 720 hours. Blood Culture Recent:   Recent Labs     06/25/20  0908   BC No Growth to date. Any change in status will be called. Urine culture no growth to date  [address] and blood culture from June 23- to date, urine culture negative, COVID-19 test negative, influenza a and B screen negative        RADIOLOGY      Ct Head Wo Contrast    Result Date: 6/25/2020  EXAM: CT HEAD WO CONTRAST -- 6/25/2020 2:15 PM HISTORY: 79 years, Male, confusion COMPARISON: None DLP: 805 mGy cm. Automated exposure control was utilized to minimize patient radiation dose. TECHNIQUE: Unenhanced axial CT images obtained from vertex to skull base with coronal and sagittal reformats. FINDINGS: No acute intracranial hemorrhage, midline shift, mass effect or large territory cortical infarct. Confluent periventricular and subcortical white matter hypodensities, most likely due to chronic microvascular disease. Ventricles, sulci, basilar cisterns are proportionally prominent suggesting volume loss. Thinning of the ocular lenses may be postoperative or age-related. Paranasal sinuses and mastoid air cells normally aerated. External auditory canals within normal limits. Calvarium appears intact. Subcutaneous tissues within normal limits. 1. No acute intracranial hemorrhage, midline shift or mass effect. 2. Confluent periventricular and subcortical white matter hypodensities with volume loss, most commonly chronic microvascular changes. If clinical concern for acute infarct, MRI would be a more sensitive exam. Signed by Dr Vivian Velazquez on 6/25/2020 4:22 PM    Xr Chest Portable    Result Date: 6/25/2020  EXAMINATION:  XR CHEST PORTABLE  6/25/2020 1:12 PM HISTORY: Shortness of breath. COMPARISON: No comparison study.  FINDINGS:  There are patchy infiltrates in the perihilar regions and lung bases. There is minimal blunting of the costophrenic angles. Heart size is upper limits of normal. Thoracic aorta is atheromatous. 1. Bilateral infiltrates likely representing pulmonary edema. Pneumonia is possible. 2. There may be small pleural effusions. Heart size is upper limits of normal. Signed by Dr Misa Amador on 6/25/2020 1:23 PM    Vl Dup Lower Extremity Venous Bilateral    Result Date: 6/25/2020  Vascular Lower Extremities DVT Study Procedure  Demographics   Patient Name     Haley Mack Age                    [de-identified]   Patient Number   [id number]       Gender                 Male   Visit Number     [id number]    Interpreting Physician Sukumar Asif MD   Date of Birth    1940   Referring Physician    Fang Caba   Accession Number [id number]   Irene 70, RVT  Procedure Type of Study:   Veins:Lower Extremities DVT Study, VL LOWER EXTREMITY BILATERAL VENOUS  DUPLEX . Technical Quality:Limited visualization due to combative patient. Impression   Unable to complete exam because patient was shaking uncontrollably. Only  right common femoral vein and saphenofemoral junction were able to be  imaged. No DVT/SVT seen in these areas. The remainder of the right leg  could not be imaged and therefore could not \"rule out\" deep or superficial  venous thrombosis. Signature   ----------------------------------------------------------------  Electronically signed by Sukumar Asif MD(Interpreting  physician) on 06/25/2020 05:28 PM        Cta Pulmonary W Contrast    Result Date: 6/25/2020  CTA PULMONARY W CONTRAST 6/25/2020 11:30 AM HISTORY: Elevated d-dimer. Shortness of breath. Clinical finding is worrisome for pulmonary embolus Comparison: None Technique: CT evaluation of the chest was performed with intravenous contrast. 2 mm transaxial images were obtained. Maximum intensity projection reconstruction angiographic images were generated .  Two-dimensional Coronal and sagittal reconstruction images of the pulmonary arteries, aorta and great vessels were also generated. .Radiation dose equals  mGy cm. AUTOMATED EXPOSURE CONTROL dose reduction technique was implemented Findings: There is image quadrant degradation related to breathing motion artifact. Pulmonary arteries opacify with iodinated contrast without intraluminal filling defects or convincing CT angiographic evidence of pulmonary embolus. The aorta is normal in caliber, without aneurysm or dissection. There is ectasia of the aortic arch with aortic calcifications. There is cardiomegaly with coronary artery calcifications. There are calcified left hilar lymph nodes. There is no mediastinal hilar lymphadenopathy. There are small bilateral pleural effusions with lower lobe airspace opacities, suspect atelectasis. There are emphysematous changes in the upper lobes with bleb formation. Evaluation of the lung fields challenging due to motion, however no definite consolidating parenchymal infiltrates identified. Thyroid gland enlargement with nodules. Left-sided nodule measures 2.3 cm. Right-sided nodule measures 1.6 cm. There is no axillary lymph node enlargement. Limited imaging the upper abdomen demonstrate no acute abnormality. There is pectus excavatum anterior chest wall deformity. No acute osseous abnormalities observed. Impression: 1. No CT angiographic evidence of pulmonary embolus or acute aortic pathology. 2. Small bilateral pleural effusions with associated airspace opacities likely atelectasis. 3. Cardiomegaly with atherosclerotic calcifications. 4. Thyroid enlargement with nodules. 5. Pectus excavatum anterior chest wall deformity. Signed by Dr Saul Trujillo on 6/25/2020 4:26 PM          Patient Active Problem List   Diagnosis    Symptomatic bradycardia       IMPRESSION:    1. Fever to almost 105 after admisison-temperature curve improving.    No pneumonia on CT angiogram, urine and blood cultures unremarkable at outside hospital as well as at [facility]. No other obvious source on exam.  Working diagnosis is tickborne, most likely Ehrlichia  2. Leukopenia-improving but with progressive neutropenia  3.  thrombocytopenia- stable  4. Elevated transaminases-worsening  5. Acute kidney injury with hematuria and proteinuria  6. Visual hallucinations-sundowning? 7. Rash on chest- appears localized and actually appears to be improving compared to when I first noticed it 3 days ago. 8. Recent falls      RECOMMENDATIONS :  · Continue doxycycline  · Follow-up when acute hepatitis panel  · Monitor liver enzymes closely. Could be worsening because of doxycycline reaction but at this point feel like we need to continue doxycycline and monitor closely. (AST is past 5 times the upper limit of normal and ALT is not)  · Await Ehrlichia and [facility] spotted fever studies. · Add CMV IgG and CMV IgM to blood already in lab  · Monitor absolute neutrophil count closely with manual differentials. · Monitor CLOSELY with change of abx  · Would continue IV fluids that I started yesterday.   · Spoke with nursing about monitoring temperatures closely and holding Tylenol unless temperature greater than or equal to 101 so we can get an accurate assessment of his temperature curve    Discussed with wife Marjorie  Discussed with JONO Leyva MD

## 2020-06-29 NOTE — CONSULTS
Palliative Care Consult Note  6/29/2020 2:00 PM  Subjective:   Admit Date: 6/25/2020  PCP: No primary care provider on file. Reason For Consult: Code status, Symptom management, Family Support    Requesting Physician: Dr. Angie Mckeon    History Obtained From: EMR, nursing, patient's spouse    Chief Complaint: AMS    History of Present Illness: Patient is a 77-year-old male who presented to this hospital on 6/25/2020 from OSH after experiencing episode of AMS and bradycardia. Patient spouse reports they were returning home after patient was seen by his PCP for routine checkup, spouse had stopped on the way home and went into the store, when she returned to the car the patient was unarousable. He was then taken to [facility] ED and was found to have a heart rate of 34-42 bpm and was referred to [facility] for cardiac evaluation. 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 He was admitted to ICU for further evaluation and treatment, cardiology consulted. Labs were completed and were fairly unremarkable, CR 1.7 but this is near his baseline, COVID-19 negative, D-dimer 4.29. CXR showing bilateral infiltrates likely representing pulmonary edema or pneumonia as possibility. CT of the chest negative for PE, CT of the head showing chronic microvascular changes with no acute issues. Cardiology was consulted, EKG shows sinus rhythm with junctional escape beats at rate of 40-50 bpm, RBBB, no pacer needed at this time, BNP slightly elevated, echo showing normal LV EF. Patient has had continued febrile episodes, ID consulted, and he was started on doxycycline for possible Erlichia with noted leukopenia and thrombocytopenia, which has improved. Nephrology consulted and following for NIDHI. He was transferred to regular floor on 6/28/2020 and continues to have confusion and restlessness/agitation.   Palliative care was consulted for discussions regarding CODE STATUS and to assist with symptom management. Past Medical History:        Diagnosis Date    Hypertension     Palliative care patient 06/26/2020    Sleep apnea     Thyroid disease     Trigeminal neuropathy        Past Surgical History:        Procedure Laterality Date    CHOLECYSTECTOMY      HERNIA REPAIR         Allergies:  Patient has no known allergies. Social History:    TOBACCO:   reports that he has quit smoking. His smoking use included cigarettes. He has never used smokeless tobacco.  ETOH:   has no history on file for alcohol. MARITAL STATUS:    Patient currently lives with family     Family History:   No family history on file.     Palliative Performance Score: 40%    Review of Systems   Unable to complete due to AMS    Palliative Review of Advance Directives:     Surrogate Decision Maker:yes, spouse    Durable Power of :no    Advanced Directives/Living Fischer: no    Out of hospital medical orders in place to reflect resuscitation status (MOLST/POLST): no    Information Sharing:  Patient's awareness of illness:  [] Terminal [] Life-Threatening [] Serious [] Non life-threatening [] Not serious   [x] Not discussed    Family awareness of illness:   [] Terminal [] Life-Threatening [x] Serious [] Nonlife-threatening [] Not serious   [] Not discussed    DIET CARDIAC;    Medications:   sodium chloride 100 mL/hr at 06/29/20 0900     Current Facility-Administered Medications   Medication Dose Route Frequency Provider Last Rate Last Dose    acetaminophen (TYLENOL) tablet 650 mg  650 mg Oral Q4H PRN Ashley Gold MD        0.9 % sodium chloride infusion   Intravenous Continuous Ashley Gold  mL/hr at 06/29/20 0900      melatonin tablet 3 mg  3 mg Oral Nightly Steffan Dancer, MD        lisinopril (PRINIVIL;ZESTRIL) tablet 20 mg  20 mg Oral Daily Barbi Llanes MD   20 mg at 06/29/20 0824    doxycycline (VIBRAMYCIN) 100 mg in dextrose 5 % 100 mL IVPB 100 mg Intravenous Q12H Trisha Whiting MD   Stopped at 06/29/20 5617    aspirin chewable tablet 81 mg  81 mg Oral Daily Marilu Agee MD   81 mg at 06/29/20 5975    polyethylene glycol (GLYCOLAX) packet 17 g  17 g Oral Daily PRN Marilu Agee MD        ondansetron (ZOFRAN-ODT) disintegrating tablet 4 mg  4 mg Oral Q8H PRN Marilu Agee MD        Or    [facility]etron [facility]) injection 4 mg  4 mg Intravenous Q6H PRN Marilu Agee MD        nitroGLYCERIN (NITROSTAT) SL tablet 0.4 mg  0.4 mg Sublingual Q5 Min PRN Marilu Agee MD        lamoTRIgine (LAMICTAL) tablet 25 mg  25 mg Oral Daily Marilu Agee MD   25 mg at 06/29/20 1800    levothyroxine (SYNTHROID) tablet 50 mcg  50 mcg Oral Daily Marilu Agee MD   50 mcg at 06/29/20 0826    sodium chloride flush 0.9 % injection 10 mL  10 mL Intravenous 2 times per day Marilu Agee MD   10 mL at 06/28/20 2041    sodium chloride flush 0.9 % injection 10 mL  10 mL Intravenous PRN Marilu Agee MD        gabapentin (NEURONTIN) capsule 300 mg  300 mg Oral TID Marilu Agee MD   300 mg at 06/29/20 0824    ipratropium-albuterol (DUONEB) nebulizer solution 1 ampule  1 ampule Inhalation Q4H WA Marilu Agee MD   1 ampule at 06/29/20 1015    hydrALAZINE (APRESOLINE) injection 10 mg  10 mg Intravenous Q4H PRN Marilu Agee MD   10 mg at 06/27/20 0130    morphine injection 1 mg  1 mg Intravenous Q4H PRN Marilu Agee MD   1 mg at 06/26/20 0211    morphine injection 2 mg  2 mg Intravenous Q4H PRN Marilu Agee MD   2 mg at 06/27/20 0208    naloxone [facility]) injection 0.4 mg  0.4 mg Intravenous PRN Marilu Agee MD            Labs:     Recent Labs     06/27/20  0403 06/28/20  0411 06/29/20  0334   WBC 2.7* 4.3* 6.1   RBC 4.28* 4.13* 4.16*   HGB 13.5* 13.1* 13.1*   HCT 39.3* 37.7* 39.6*   MCV 91.8 91.3 95.2*   MCH 31.5* 31.7* 31.5*   MCHC 34.4 34.7 33.1   PLT 59* 60* 79*     Recent Labs     06/27/20  0403 06/28/20  0411 06/29/20  0334   * 135* 139 K 4.1 3.8  3.8 3.6  3.6   ANIONGAP 13 11 13    101 106   CO2 21* 23 20*   BUN 17 20 17   CREATININE 1.7* 1.8* 1.6*   GLUCOSE 107 103 121*   CALCIUM 8.9 9.0 9.0     No results for input(s): MG, PHOS in the last 72 hours. Recent Labs     06/27/20  0403 06/28/20  0411 06/29/20  0334   * 306* 346*   ALT 95* 145* 165*   BILITOT 2.0* 1.8* 1.3*   ALKPHOS 139* 157* 206*     ABGs:No results for input(s): PHART, ITL2KZS, PO2ART, IXD4GOW, BEART, HGBAE, T1VTCMXH, CARBOXHGBART, 02THERAPY in the last 72 hours. HgBA1c: No results for input(s): LABA1C in the last 72 hours. FLP:    Lab Results   Component Value Date    TRIG 81 06/26/2020    HDL 38 06/26/2020    LDLCALC 50 06/26/2020     TSH:    Lab Results   Component Value Date    TSH 3.510 06/28/2020     Troponin T: No results for input(s): TROPONINI in the last 72 hours. INR: No results for input(s): INR in the last 72 hours.     Objective:   Vitals: BP (!) 150/74   Pulse 75   Temp 97.8 °F (36.6 °C) (Temporal)   Resp 18   Ht 6' (1.829 m)   Wt 172 lb 1.6 oz (78.1 kg)   SpO2 93%   BMI 23.34 kg/m²   24HR INTAKE/OUTPUT:      Intake/Output Summary (Last 24 hours) at 6/29/2020 1400  Last data filed at 6/29/2020 0442  Gross per 24 hour   Intake --   Output 400 ml   Net -400 ml     Physical Exam      General appearance: alert, confused, and cooperative with exam, no acute distress, restless at times  HEENT: atraumatic, eyes with clear conjunctiva and normal lids, pupils and irises normal, external ears and nose are normal,lips normal.  Neck: without masses, supple   Lungs: no increased work of breathing, clear to auscultation bilaterally  Heart: regular rate and rhythm and S1, S2 normal  Abdomen: soft, non-tender; bowel sounds normal; no masses,  no organomegaly  Genitourinary: No bladder fullness, masses, or tenderness  Extremities: extremities normal, atraumatic, no cyanosis or edema  Neurologic: No focal neurologic deficits, normal sensation,  alert to self, discuss the difference between terminal state and acute events, spouse would not indicate a clear choice on what she want should the patient undergo an acute episode, does continue to state that he likely would not want to be intubated, and this should be an ongoing discussion. Patient spouse has arranged for other family to be at the bedside so that she can go home and rest, she does appear to be fairly drained and emotional.  I provided emotional support to the patient's spouse. I will revisit CODE STATUS with her again tomorrow to help clarify her wishes in all instances. Palliative care will continue to follow. Recommendations:     1. Hydroxyzine 12.5 mg every 6 hrs PRN for agitation. Could increase hydroxyzine to 25 mg if ineffective or consider short term seroquel at  for insomnia and agitation. 2. Encouraged completion of living will when patient is able. Patient is currently unable to complete, spouse has requested a blank copy and Palliative Care  will assist.    3. Continue discussion regarding code status. Spouse has indicated that she would not want resuscitative measures taken for the patient if he were in a terminal situation but would not indicate DNR status at this time. Thank you for consulting palliative care and allowing us to participate in the care of the patient.       CounselingTopics: Code Status, symptom management, support    Time Spent Counselin min                                       Total Face to Face Time:  40 min  Time Spent Reviewing Records/Provider Communication: 15 min  Total Time Spent: 55 min    Electronically signed by FEROZ Alvarado on 2020 at 2:00 PM    (Please note that portions of this note were completed with a voice recognition program.  Efforts were made to edit the dictations but occasionally words are mis-transcribed.)

## 2020-06-30 LAB
ALBUMIN SERPL-MCNC: 3.3 G/DL (ref 3.5–5.2)
ALP BLD-CCNC: 228 U/L (ref 40–130)
ALT SERPL-CCNC: 160 U/L (ref 5–41)
ANCA IFA: NORMAL
ANION GAP SERPL CALCULATED.3IONS-SCNC: 13 MMOL/L (ref 7–19)
ANTINUCLEAR AB INTERPRETIVE COMMENT: NORMAL
ANTINUCLEAR ANTIBODY, HEP-2, IGG: NORMAL
AST SERPL-CCNC: 275 U/L (ref 5–40)
BASOPHILS ABSOLUTE: 0 K/UL (ref 0–0.2)
BASOPHILS RELATIVE PERCENT: 0 % (ref 0–1)
BILIRUB SERPL-MCNC: 1.1 MG/DL (ref 0.2–1.2)
BLOOD CULTURE, ROUTINE: NORMAL
BUN BLDV-MCNC: 11 MG/DL (ref 8–23)
BURR CELLS: ABNORMAL
C3 COMPLEMENT: 101 MG/DL (ref 88–201)
C4 COMPLEMENT: 34 MG/DL (ref 10–40)
CALCIUM SERPL-MCNC: 8.8 MG/DL (ref 8.8–10.2)
CHLORIDE BLD-SCNC: 110 MMOL/L (ref 98–111)
CO2: 21 MMOL/L (ref 22–29)
CREAT SERPL-MCNC: 1.3 MG/DL (ref 0.5–1.2)
CULTURE, BLOOD 2: NORMAL
EOSINOPHILS ABSOLUTE: 0.09 K/UL (ref 0–0.6)
EOSINOPHILS RELATIVE PERCENT: 1 % (ref 0–5)
GFR NON-AFRICAN AMERICAN: 53
GLUCOSE BLD-MCNC: 97 MG/DL (ref 74–109)
HCT VFR BLD CALC: 40.8 % (ref 42–52)
HEMOGLOBIN: 12.9 G/DL (ref 14–18)
LYMPHOCYTES ABSOLUTE: 5.6 K/UL (ref 1.1–4.5)
LYMPHOCYTES RELATIVE PERCENT: 60 % (ref 20–40)
MAGNESIUM: 2 MG/DL (ref 1.6–2.4)
MCH RBC QN AUTO: 30.7 PG (ref 27–31)
MCHC RBC AUTO-ENTMCNC: 31.6 G/DL (ref 33–37)
MCV RBC AUTO: 97.1 FL (ref 80–94)
MONOCYTES ABSOLUTE: 0 K/UL (ref 0–0.9)
MONOCYTES RELATIVE PERCENT: 0 % (ref 0–10)
NEUTROPHILS ABSOLUTE: 3.7 K/UL (ref 1.5–7.5)
NEUTROPHILS RELATIVE PERCENT: 39 % (ref 50–65)
PDW BLD-RTO: 14.1 % (ref 11.5–14.5)
PHOSPHORUS: 2.5 MG/DL (ref 2.5–4.5)
PLATELET # BLD: 101 K/UL (ref 130–400)
PLATELET SLIDE REVIEW: ABNORMAL
PMV BLD AUTO: 11.7 FL (ref 9.4–12.4)
POTASSIUM REFLEX MAGNESIUM: 3.2 MMOL/L (ref 3.5–5)
RBC # BLD: 4.2 M/UL (ref 4.7–6.1)
SODIUM BLD-SCNC: 144 MMOL/L (ref 136–145)
TOTAL PROTEIN: 5 G/DL (ref 6.6–8.7)
VITAMIN D 25-HYDROXY: 44.4 NG/ML
WBC # BLD: 9.4 K/UL (ref 4.8–10.8)

## 2020-06-30 PROCEDURE — 36415 COLL VENOUS BLD VENIPUNCTURE: CPT

## 2020-06-30 PROCEDURE — 83735 ASSAY OF MAGNESIUM: CPT

## 2020-06-30 PROCEDURE — 97530 THERAPEUTIC ACTIVITIES: CPT

## 2020-06-30 PROCEDURE — 99232 SBSQ HOSP IP/OBS MODERATE 35: CPT | Performed by: NURSE PRACTITIONER

## 2020-06-30 PROCEDURE — 85027 COMPLETE CBC AUTOMATED: CPT

## 2020-06-30 PROCEDURE — 2140000000 HC CCU INTERMEDIATE R&B

## 2020-06-30 PROCEDURE — 97165 OT EVAL LOW COMPLEX 30 MIN: CPT

## 2020-06-30 PROCEDURE — 6370000000 HC RX 637 (ALT 250 FOR IP): Performed by: HOSPITALIST

## 2020-06-30 PROCEDURE — 6370000000 HC RX 637 (ALT 250 FOR IP): Performed by: INTERNAL MEDICINE

## 2020-06-30 PROCEDURE — 84100 ASSAY OF PHOSPHORUS: CPT

## 2020-06-30 PROCEDURE — 97535 SELF CARE MNGMENT TRAINING: CPT

## 2020-06-30 PROCEDURE — 80053 COMPREHEN METABOLIC PANEL: CPT

## 2020-06-30 PROCEDURE — 97110 THERAPEUTIC EXERCISES: CPT

## 2020-06-30 PROCEDURE — 82306 VITAMIN D 25 HYDROXY: CPT

## 2020-06-30 PROCEDURE — 2500000003 HC RX 250 WO HCPCS: Performed by: INTERNAL MEDICINE

## 2020-06-30 PROCEDURE — 85007 BL SMEAR W/DIFF WBC COUNT: CPT

## 2020-06-30 PROCEDURE — 2580000003 HC RX 258: Performed by: HOSPITALIST

## 2020-06-30 PROCEDURE — 94640 AIRWAY INHALATION TREATMENT: CPT

## 2020-06-30 PROCEDURE — 2580000003 HC RX 258: Performed by: INTERNAL MEDICINE

## 2020-06-30 RX ORDER — MAGNESIUM SULFATE 1 G/100ML
1 INJECTION INTRAVENOUS PRN
Status: DISCONTINUED | OUTPATIENT
Start: 2020-06-30 | End: 2020-07-03 | Stop reason: HOSPADM

## 2020-06-30 RX ORDER — POTASSIUM CHLORIDE 20 MEQ/1
40 TABLET, EXTENDED RELEASE ORAL PRN
Status: DISCONTINUED | OUTPATIENT
Start: 2020-06-30 | End: 2020-07-03 | Stop reason: HOSPADM

## 2020-06-30 RX ORDER — POTASSIUM CHLORIDE 7.45 MG/ML
10 INJECTION INTRAVENOUS PRN
Status: DISCONTINUED | OUTPATIENT
Start: 2020-06-30 | End: 2020-07-03 | Stop reason: HOSPADM

## 2020-06-30 RX ADMIN — DOXYCYCLINE 100 MG: 100 INJECTION, POWDER, LYOPHILIZED, FOR SOLUTION INTRAVENOUS at 05:53

## 2020-06-30 RX ADMIN — LISINOPRIL 20 MG: 20 TABLET ORAL at 08:40

## 2020-06-30 RX ADMIN — GABAPENTIN 300 MG: 300 CAPSULE ORAL at 08:40

## 2020-06-30 RX ADMIN — SODIUM CHLORIDE, PRESERVATIVE FREE 10 ML: 5 INJECTION INTRAVENOUS at 08:44

## 2020-06-30 RX ADMIN — LAMOTRIGINE 25 MG: 25 TABLET ORAL at 08:40

## 2020-06-30 RX ADMIN — LEVOTHYROXINE SODIUM 50 MCG: 50 TABLET ORAL at 05:53

## 2020-06-30 RX ADMIN — DOXYCYCLINE 100 MG: 100 INJECTION, POWDER, LYOPHILIZED, FOR SOLUTION INTRAVENOUS at 17:56

## 2020-06-30 RX ADMIN — GABAPENTIN 300 MG: 300 CAPSULE ORAL at 20:36

## 2020-06-30 RX ADMIN — IPRATROPIUM BROMIDE AND ALBUTEROL SULFATE 1 AMPULE: .5; 3 SOLUTION RESPIRATORY (INHALATION) at 06:49

## 2020-06-30 RX ADMIN — SODIUM CHLORIDE, PRESERVATIVE FREE 10 ML: 5 INJECTION INTRAVENOUS at 20:36

## 2020-06-30 RX ADMIN — IPRATROPIUM BROMIDE AND ALBUTEROL SULFATE 1 AMPULE: .5; 3 SOLUTION RESPIRATORY (INHALATION) at 14:07

## 2020-06-30 RX ADMIN — IPRATROPIUM BROMIDE AND ALBUTEROL SULFATE 1 AMPULE: .5; 3 SOLUTION RESPIRATORY (INHALATION) at 19:12

## 2020-06-30 RX ADMIN — POTASSIUM BICARBONATE 40 MEQ: 782 TABLET, EFFERVESCENT ORAL at 10:02

## 2020-06-30 RX ADMIN — ASPIRIN 81 MG 81 MG: 81 TABLET ORAL at 08:40

## 2020-06-30 RX ADMIN — MELATONIN 3 MG: at 20:36

## 2020-06-30 RX ADMIN — IPRATROPIUM BROMIDE AND ALBUTEROL SULFATE 1 AMPULE: .5; 3 SOLUTION RESPIRATORY (INHALATION) at 10:18

## 2020-06-30 NOTE — PROGRESS NOTES
INFECTIOUS DISEASES PROGRESS NOTE    Patient:  Renato Negron  YOB: 1940  MRN: [id number]   Admit date: 6/25/2020   Admitting Physician: Julio Cesar Kilgore MD  Primary Care Physician: No primary care provider on file. CHIEF COMPLAINT:   Not sleeping well    Interval History: Patient's daughter is at bedside. She reports the patient had not been sleeping well at night. He is finally rested some today. He has not eaten much but he did work with PT and OT    Less confusion today. Allergies: No Known Allergies    Current Meds: potassium chloride (KLOR-CON M) extended release tablet 40 mEq, PRN    Or  potassium bicarb-citric acid (EFFER-K) effervescent tablet 40 mEq, PRN    Or  potassium chloride 10 mEq/100 mL IVPB (Peripheral Line), PRN  magnesium sulfate 1 g in dextrose 5% 100 mL IVPB, PRN  acetaminophen (TYLENOL) tablet 650 mg, Q4H PRN  0.9 % sodium chloride infusion, Continuous  melatonin tablet 3 mg, Nightly  hydrOXYzine (ATARAX) tablet 12.5 mg, Q6H PRN  lisinopril (PRINIVIL;ZESTRIL) tablet 20 mg, Daily  doxycycline (VIBRAMYCIN) 100 mg in dextrose 5 % 100 mL IVPB, Q12H  aspirin chewable tablet 81 mg, Daily  polyethylene glycol (GLYCOLAX) packet 17 g, Daily PRN  ondansetron (ZOFRAN-ODT) disintegrating tablet 4 mg, Q8H PRN    Or  ondansetron (ZOFRAN) injection 4 mg, Q6H PRN  nitroGLYCERIN (NITROSTAT) SL tablet 0.4 mg, Q5 Min PRN  lamoTRIgine (LAMICTAL) tablet 25 mg, Daily  levothyroxine (SYNTHROID) tablet 50 mcg, Daily  sodium chloride flush 0.9 % injection 10 mL, 2 times per day  sodium chloride flush 0.9 % injection 10 mL, PRN  gabapentin (NEURONTIN) capsule 300 mg, TID  ipratropium-albuterol (DUONEB) nebulizer solution 1 ampule, Q4H WA  hydrALAZINE (APRESOLINE) injection 10 mg, Q4H PRN  morphine injection 1 mg, Q4H PRN  morphine injection 2 mg, Q4H PRN  naloxone (NARCAN) injection 0.4 mg, PRN        Review of Systems   Constitutional: Positive for fever (Low-grade).    Neurological: Positive for weakness. Vital Signs:  BP (!) 151/77   Pulse 57   Temp 97.7 °F (36.5 °C) (Temporal)   Resp 16   Ht 6' (1.829 m)   Wt 172 lb 1.6 oz (78.1 kg)   SpO2 93%   BMI 23.34 kg/m²   Temp (24hrs), Av.4 °F (36.9 °C), Min:97 °F (36.1 °C), Max:99.5 °F (37.5 °C)      Physical Exam  General: The patient is an elderly male, awakened from sleep in no acute distress  HEENT: Sclera anicteric   Neck: Without meningismus  Respiratory: Effort even and unlabored  Abdomen: Soft, bowel sounds are positive, nontender  Neuro: He was awakened from sleep, answer questions appropriately, follows simple commands appropriately. Psych: He was pleasant and cooperative    Line/IV site: No erythema,warmth, induration, or tenderness. Left upper extremity    LAB RESULTS:    CBC with DIFF:  Recent Labs     20   WBC 4.3* 6.1 9.4   RBC 4.13* 4.16* 4.20*   HGB 13.1* 13.1* 12.9*   HCT 37.7* 39.6* 40.8*   MCV 91.3 95.2* 97.1*   MCH 31.7* 31.5* 30.7   MCHC 34.7 33.1 31.6*   RDW 13.3 13.7 14.1   PLT 60* 79* 101*   MPV 12.9* 12.7* 11.7   NEUTOPHILPCT 36.0* 17.0* 39.0*   LYMPHOPCT 46.0* 58.0* 60.0*   MONOPCT 16.0* 17.0* 0.0   EOSRELPCT 0.0 0.0 1.0   BASOPCT 0.0 0.0 0.0   NEUTROABS 1.6 1.0* 3.7   LYMPHSABS 2.0 4.0 5.6*   MONOSABS 0.70 1.00* 0.00   EOSABS 0.00 0.00 0.09   BASOSABS 0.00 0.00 0.00       CMP/BMP:  Recent Labs     20/20  0330   * 139 144   K 3.8  3.8 3.6  3.6 3.2*    106 110   CO2 23 20* 21*   ANIONGAP 11 13 13   GLUCOSE 103 121* 97   BUN 20 17 11   CREATININE 1.8* 1.6* 1.3*   LABGLOM 36* 42* 53*   CALCIUM 9.0 9.0 8.8   PROT 5.1* 5.2* 5.0*   LABALBU 3.2* 3.2* 3.3*   BILITOT 1.8* 1.3* 1.1   ALKPHOS 157* 206* 228*   * 165* 160*   * 346* 275*     Results for Myles Hanley (MRN [id number]) as of 2020 08:55   Ref.  Range 2020 02:55 2020 04:03 2020 04:11 2020 03:34   AST Latest Ref Range: 5 - 40 U/L 49 (H) 221 (H) 306 (H) 346 (H)       Culture Results:    No results for input(s): CXSURG in the last 720 hours. Blood Culture Recent:   Recent Labs     06/25/20  0908   BC No growth after 5 days of incubation. Urine culture no growth to date  [address] and blood culture from June 23- to date, urine culture negative, COVID-19 test negative, influenza a and B screen negative        RADIOLOGY      Ct Head Wo Contrast    Result Date: 6/25/2020  EXAM: CT HEAD WO CONTRAST -- 6/25/2020 2:15 PM HISTORY: 79 years, Male, confusion COMPARISON: None DLP: 805 mGy cm. Automated exposure control was utilized to minimize patient radiation dose. TECHNIQUE: Unenhanced axial CT images obtained from vertex to skull base with coronal and sagittal reformats. FINDINGS: No acute intracranial hemorrhage, midline shift, mass effect or large territory cortical infarct. Confluent periventricular and subcortical white matter hypodensities, most likely due to chronic microvascular disease. Ventricles, sulci, basilar cisterns are proportionally prominent suggesting volume loss. Thinning of the ocular lenses may be postoperative or age-related. Paranasal sinuses and mastoid air cells normally aerated. External auditory canals within normal limits. Calvarium appears intact. Subcutaneous tissues within normal limits. 1. No acute intracranial hemorrhage, midline shift or mass effect. 2. Confluent periventricular and subcortical white matter hypodensities with volume loss, most commonly chronic microvascular changes. If clinical concern for acute infarct, MRI would be a more sensitive exam. Signed by Dr Tara Sam on 6/25/2020 4:22 PM    Xr Chest Portable    Result Date: 6/25/2020  EXAMINATION:  XR CHEST PORTABLE  6/25/2020 1:12 PM HISTORY: Shortness of breath. COMPARISON: No comparison study. FINDINGS:  There are patchy infiltrates in the perihilar regions and lung bases. There is minimal blunting of the costophrenic angles. vessels were also generated. .Radiation dose equals  mGy cm. AUTOMATED EXPOSURE CONTROL dose reduction technique was implemented Findings: There is image quadrant degradation related to breathing motion artifact. Pulmonary arteries opacify with iodinated contrast without intraluminal filling defects or convincing CT angiographic evidence of pulmonary embolus. The aorta is normal in caliber, without aneurysm or dissection. There is ectasia of the aortic arch with aortic calcifications. There is cardiomegaly with coronary artery calcifications. There are calcified left hilar lymph nodes. There is no mediastinal hilar lymphadenopathy. There are small bilateral pleural effusions with lower lobe airspace opacities, suspect atelectasis. There are emphysematous changes in the upper lobes with bleb formation. Evaluation of the lung fields challenging due to motion, however no definite consolidating parenchymal infiltrates identified. Thyroid gland enlargement with nodules. Left-sided nodule measures 2.3 cm. Right-sided nodule measures 1.6 cm. There is no axillary lymph node enlargement. Limited imaging the upper abdomen demonstrate no acute abnormality. There is pectus excavatum anterior chest wall deformity. No acute osseous abnormalities observed. Impression: 1. No CT angiographic evidence of pulmonary embolus or acute aortic pathology. 2. Small bilateral pleural effusions with associated airspace opacities likely atelectasis. 3. Cardiomegaly with atherosclerotic calcifications. 4. Thyroid enlargement with nodules. 5. Pectus excavatum anterior chest wall deformity. Signed by Dr Rosa Maria Goode on 6/25/2020 4:26 PM          Patient Active Problem List   Diagnosis    Symptomatic bradycardia    Palliative care patient    NIDHI (acute kidney injury) ([facility] [address]    Altered mental status    Fever, unspecified       IMPRESSION:    1. Fever to almost 105 after admisison-temperature curve improving.    No pneumonia on CT angiogram, urine and blood cultures unremarkable at outside hospital as well as at [facility]. No other obvious source on exam.  Working diagnosis is tickborne, most likely Ehrlichia  2. Leukopenia- resolved  3.  thrombocytopenia- improving  4. Elevated transaminases- slightly improving  5. Acute kidney injury with hematuria and proteinuria-improving  6. Visual hallucinations-sundowning? Appears to be resolved  7. Rash on chest-   8. Recent falls      RECOMMENDATIONS :  · Continue doxycycline-if temperature down tomorrow can transition to oral  · Monitor liver enzymes closely. Could be worsening because of doxycycline reaction but at this point feel like we need to continue doxycycline and monitor closely. (AST is past 5 times the upper limit of normal and ALT is not)  · Await Ehrlichia and [facility] spotted fever studies. · Await CMV IgG and CMV IgM to blood already in lab    Discussed with daughter at bedside.     Brandi Mike MD

## 2020-06-30 NOTE — PROGRESS NOTES
Palliative Care Progress Note  6/30/2020 11:58 AM  Subjective:   Admit Date: 6/25/2020  PCP: No primary care provider on file. Chief Complaint: AMS    Interval History: No acute overnight events. Patient is currently sleeping soundly in bed. He continues to be followed by ID and remains on ABX. Nephrology following for NIDHI and management of fluids. He was able to work with physical therapy today and was assisted up to bedside chair for a while. Oral intake remains poor, hopefully this will improve as his mental status improves. Continue discussions regarding goals of care. Portions of this note have been copied forward, however, changed to reflect the most current clinical status of this patient.     Review of Systems   Unable to obtain due to AMS    DIET CARDIAC;    Medications:   sodium chloride 40 mL/hr (06/30/20 1131)     Current Facility-Administered Medications   Medication Dose Route Frequency Provider Last Rate Last Dose    potassium chloride (KLOR-CON M) extended release tablet 40 mEq  40 mEq Oral PRN Gilberto Platt MD        Or    potassium bicarb-citric acid (EFFER-K) effervescent tablet 40 mEq  40 mEq Oral PRN Gilberto Platt MD   40 mEq at 06/30/20 1002    Or    potassium chloride 10 mEq/100 mL IVPB (Peripheral Line)  10 mEq Intravenous PRN Gilberto Platt MD        magnesium sulfate 1 g in dextrose 5% 100 mL IVPB  1 g Intravenous PRN Gilberto Platt MD        acetaminophen (TYLENOL) tablet 650 mg  650 mg Oral Q4H PRN Israel Arredondo MD        0.9 % sodium chloride infusion   Intravenous Continuous Alida Persaud MD 40 mL/hr at 06/30/20 1131 40 mL/hr at 06/30/20 1131    melatonin tablet 3 mg  3 mg Oral Nightly [facility], MD   3 mg at 06/29/20 2007    hydrOXYzine (ATARAX) tablet 12.5 mg  12.5 mg Oral Q6H PRN FEROZ Alvarado   12.5 mg at 06/29/20 1739    lisinopril (PRINIVIL;ZESTRIL) tablet 20 mg  20 mg Oral Daily Parisa Graham MD   20 mg at 06/30/20 0840    the care of this patient.        Electronically signed by FEROZ Simmons on 6/30/2020 at 11:58 AM    (Please note that portions of this note were completed with a voice recognition program.  Jose Car made to edit the dictations but occasionally words are mis-transcribed.)

## 2020-06-30 NOTE — PROGRESS NOTES
[facility]      Patient:  Amaury Haas  YOB: 1940  Date of Service: 6/30/2020  MRN: [id number]   Acct: [de-identified]   Primary Care Physician: No primary care provider on file. Advance Directive: Full Code  Admit Date: 6/25/2020       Hospital Day: 5    Chief Complaint: No chief complaint on file. Mr. Amaury Haas is a [de-identified]year old  american gentleman. He was referred to us from the [facility] ED by the EP Dr. Lazcano Son who also happens to be his PCP. He states that the patient has no history of bradycardia. The patient was on the way back home from being seen in the PCP's office for a routine checkup. The patient and his wife stopped at the store on the way home, and he had waited in the car. When his wife came back he was unarousable. He was seen and assessed at the ED, and was found to have a heart rate of 34-42bpm. He was referred for cardiac evaluation. [address] [facility] covid test results negative.     Subjective: family at bedside, patient asleep   6/30/20  PMH of dementia, HTN, Presented with syncope with bradycardia , Echo good EF, CTA -ve PE, bradycardia resolved, was found to have fever 105, UC BC -ve,COVID-19 -ve  noted to have leukopenia and thrombocytopenia ID on board Working diagnosis is tickborne most likely Ehrlichia, placed on doxy,renal consulted for NIDHI, glomerulonephritis, now Cr down to 1.3, noticed elevated LFTs  Check acute hepatitis panel -ve , advance diet check B12 ammonia TSH -ve all , per ID awaiting  CMV IgG and CMV IgM ,   6/29/20  PMH of dementia, HTN, Presented with syncope with bradycardia , Echo good EF, CTA -ve PE, bradycardia resolved, was found to have fever 105, UC BC -ve,COVID-19 -ve  noted to have leukopenia and thrombocytopenia ID on board Working diagnosis is tickborne most likely Ehrlichia, placed on doxy,renal consulted for NIDHI, glomerulonephritis, now Cr down to 1.6, noticed elevated LFTs  Check acute hepatitis panel, advance diet check B12 ammonia TSH -ve all   6/28/20  PMH of dementia, HTN, Presented with syncope with bradycardia , Echo good EF, CTA -ve PE, bradycardia resolved, was found to have fever 105, UC BC -ve,COVID-19 -ve  noted to have leukopenia and thrombocytopenia ID on board Working diagnosis is tickborne most likely Ehrlichia, placed on doxy,renal consulted for NIDHI, glomerulonephritis,  Check acute hepatitis panel, advance diet check B12 -ve  Ammonia-ve TSH -ve   Objective:   VITALS:  /72   Pulse 55   Temp 98.6 °F (37 °C) (Temporal)   Resp 20   Ht 6' (1.829 m)   Wt 172 lb 1.6 oz (78.1 kg)   SpO2 100%   BMI 23.34 kg/m²   24HR INTAKE/OUTPUT:      Intake/Output Summary (Last 24 hours) at 6/30/2020 0811  Last data filed at 6/30/2020 0431  Gross per 24 hour   Intake 1060 ml   Output 850 ml   Net 210 ml     Constitutional: axox3  HENT:    Head: Normocephalic and atraumatic. Mouth/Throat: Oropharynx is clear and moist. No oropharyngeal exudate. Eyes: Conjunctivae and EOM are normal. Pupils are equal, round, and reactive to light. No scleral icterus. Neck: Normal range of motion. Neck supple. No JVD present. No tracheal deviation present. No thyromegaly present. Cardiovascular: s1s2 sarah     Pulmonary/Chest: Effort normal and breath sounds normal. No stridor. No respiratory distress. No wheezes. No rales. Abdominal: Soft. Bowel sounds are normal. No distension and no mass. There is no tenderness. There is no rebound and no guarding. Musculoskeletal: Normal range of motion. There is no edema or tenderness. Extremities: No edema  Neurological: sleeping   Skin: Skin is warm and dry. No rash noted. Not diaphoretic. No erythema. No pallor.    Psychiatric: sleeping     Medications:      sodium chloride 100 mL/hr at 06/29/20 0900      melatonin  3 mg Oral Nightly    lisinopril  20 mg Oral Daily    doxycycline (VIBRAMYCIN) IV  100 mg Intravenous Q12H    aspirin  81 mg Oral Daily    lamoTRIgine 25 mg Oral Daily    levothyroxine  50 mcg Oral Daily    sodium chloride flush  10 mL Intravenous 2 times per day    gabapentin  300 mg Oral TID    ipratropium-albuterol  1 ampule Inhalation Q4H WA     potassium chloride **OR** potassium alternative oral replacement **OR** potassium chloride, magnesium sulfate, acetaminophen, hydrOXYzine, polyethylene glycol, ondansetron **OR** ondansetron, nitroGLYCERIN, sodium chloride flush, hydrALAZINE, morphine, morphine, naloxone  DIET CARDIAC;         Lab and other Data:     Recent Labs     06/28/20 0411 06/29/20  0334 06/30/20  0330   WBC 4.3* 6.1 9.4   HGB 13.1* 13.1* 12.9*   PLT 60* 79* 101*     Recent Labs     06/28/20 0411 06/29/20  0334 06/30/20  0330   * 139 144   K 3.8  3.8 3.6  3.6 3.2*    106 110   CO2 23 20* 21*   BUN 20 17 11   CREATININE 1.8* 1.6* 1.3*   GLUCOSE 103 121* 97     Recent Labs     06/28/20 0411 06/29/20  0334 06/30/20  0330   * 346* 275*   * 165* 160*   BILITOT 1.8* 1.3* 1.1   ALKPHOS 157* 206* 228*     Troponin T:   No results for input(s): TROPONINI in the last 72 hours. Pro-BNP: No results for input(s): BNP in the last 72 hours. INR:   No results for input(s): INR in the last 72 hours. ABGs:   Lab Results   Component Value Date    PHART 7.460 06/25/2020    PO2ART 65.0 06/25/2020    XCP5PSX 25.0 06/25/2020     UA:  Recent Labs     06/29/20  1550   COLORU YELLOW   PHUR 6.0   CLARITYU Clear   SPECGRAV 1.009   LEUKOCYTESUR Negative   UROBILINOGEN 1.0   BILIRUBINUR Negative   BLOODU Negative   GLUCOSEU Negative       Imaging:   US RENAL COMPLETE   Final Result   1. Negative for hydronephrosis or renal atrophy. 2.  2.1 cm cyst in the LEFT kidney without complex features. Signed by Dr Tahir Pittman on 6/27/2020 12:05 PM      CT HEAD WO CONTRAST   Final Result   1. No acute intracranial hemorrhage, midline shift or mass effect.    2. Confluent periventricular and subcortical white matter   hypodensities with volume loss, most commonly chronic microvascular   changes. If clinical concern for acute infarct, MRI would be a more   sensitive exam.   Signed by Dr Masterson Going on 6/25/2020 4:22 PM      CTA PULMONARY W CONTRAST   Final Result   Impression:   1. No CT angiographic evidence of pulmonary embolus or acute aortic   pathology. 2. Small bilateral pleural effusions with associated airspace   opacities likely atelectasis. 3. Cardiomegaly with atherosclerotic calcifications. 4. Thyroid enlargement with nodules. 5. Pectus excavatum anterior chest wall deformity. Signed by Dr Pereira Pac on 6/25/2020 4:26 PM      VL DUP LOWER EXTREMITY VENOUS BILATERAL   Final Result      XR CHEST PORTABLE   Final Result   1. Bilateral infiltrates likely representing pulmonary edema. Pneumonia is possible. 2. There may be small pleural effusions. Heart size is upper limits of   normal.   Signed by Dr Clarisa Blackburn on 6/25/2020 1:23 PM        Micro:   Blood Culture, Routine   Date Value Ref Range Status   06/25/2020   Preliminary    No Growth to date. Any change in status will be called.   , No components found for: LABURINE  Patient Active Problem List    Diagnosis Date Noted    N[facility]HI [facility]) ([facility] [address]     Altered mental status     Fever, unspecified     Palliative care patient 06/26/2020    Symptomatic bradycardia 06/25/2020       Assessment/plan: Active Problems:    Symptomatic bradycardia    fever      Plan:   Possible Tick-borne illness  FEVER unknown source  covid testing negative at [facility] get blood cx, urine cx,  Will get LA  Will place on empiric abx and   ID consulted- Doxy add  Tick borne panel ordered    Elevated Liver enzymes      Symptomatic Bradycardia with NEW RBB and Left Fascicular Blocks - ACS R/O:  Dobutamine GGT for HR>50bpm  Tele  Cardio on board  Trend TnI for 3 more Q3h apart   echo and CXR reviewed  -cardio reccomends no need for pacemaker at this point.     Sepsis likely secondary

## 2020-06-30 NOTE — PROGRESS NOTES
Nephrology ([address]) Progress Note    Patient:  Araseli Caro  YOB: 1940  Date of Service: 6/30/2020  MRN: [id number]   Acct: [de-identified]   Primary Care Physician: No primary care provider on file. Advance Directive: Full Code  Admit Date: 6/25/2020       Hospital Day: 5  Referring Provider: Negro Tate MD    Patient independently seen and examined, Chart, Consults, Notes, Operative notes, Labs, Cardiology, and Radiology studies reviewed as able. Subjective: Joseph Delaney is a [de-identified] y.o. male  whom we were consulted for acute kidney injury. Patient denied any history of chronic kidney disease. Presented to the emergency room as he was brought in by family to the [facility] ER with fever up to 105. In the emergency room he was found to have bradycardia, increasing creatinine, thrombocytopenia and leukopenia. He was transferred to [facility] for further evaluation of fever. Apparently he was confused and had encephalopathy which was resolving slowly. He has a history of hypertension, sleep apnea and trigeminal neuralgia. Nephrology is consulted as he has serum creatinine of 1.7. His urinalysis showed patient has proteinuria and hematuria. He was initially admitted to ICU now transferred back to regular floor.     Today, no overnight events. Family present. Noted to be less agitated per nursing. No overnight events noted. Denies chest pain, shortness of air, nausea vomiting.       Allergies:  Patient has no known allergies.     Medicines:  Current Facility-Administered Medications   Medication Dose Route Frequency Provider Last Rate Last Dose    potassium chloride (KLOR-CON M) extended release tablet 40 mEq  40 mEq Oral PRN Regan Fernando MD        Or    potassium bicarb-citric acid (EFFER-K) effervescent tablet 40 mEq  40 mEq Oral PRN Regan Fernando MD   40 mEq at 06/30/20 1002    Or    potassium chloride 10 mEq/100 mL IVPB (Peripheral Line)  10 mEq Intravenous PRN Lisset Cali MD        magnesium sulfate 1 g in dextrose 5% 100 mL IVPB  1 g Intravenous PRN Lisset Cali MD        acetaminophen (TYLENOL) tablet 650 mg  650 mg Oral Q4H PRN Sirisha Norman MD        0.9 % sodium chloride infusion   Intravenous Continuous Sirisha Norman  mL/hr at 06/29/20 0900      melatonin tablet 3 mg  3 mg Oral Nightly Adelia Servin MD   3 mg at 06/29/20 2007    hydrOXYzine (ATARAX) tablet 12.5 mg  12.5 mg Oral Q6H PRN Terryl Lyndon, APRN   12.5 mg at 06/29/20 1739    lisinopril (PRINIVIL;ZESTRIL) tablet 20 mg  20 mg Oral Daily Nakul Pabon MD   20 mg at 06/30/20 0840    doxycycline (VIBRAMYCIN) 100 mg in dextrose 5 % 100 mL IVPB  100 mg Intravenous Q12H Sirisha Norman MD   Stopped at 06/30/20 4650    aspirin chewable tablet 81 mg  81 mg Oral Daily Fang Caba MD   81 mg at 06/30/20 0840    polyethylene glycol (GLYCOLAX) packet 17 g  17 g Oral Daily PRN Fang Caba MD        ondansetron (ZOFRAN-ODT) disintegrating tablet 4 mg  4 mg Oral Q8H PRN Fang Caba MD        Or    ondansetron [facility] injection 4 mg  4 mg Intravenous Q6H PRN Fang Caba MD        nitroGLYCERIN (NITROSTAT) SL tablet 0.4 mg  0.4 mg Sublingual Q5 Min PRN Fang Caba MD        lamoTRIgine (LAMICTAL) tablet 25 mg  25 mg Oral Daily Fang Caba MD   25 mg at 06/30/20 0840    levothyroxine (SYNTHROID) tablet 50 mcg  50 mcg Oral Daily Fang Caba MD   50 mcg at 06/30/20 0553    sodium chloride flush 0.9 % injection 10 mL  10 mL Intravenous 2 times per day Fang Caba MD   10 mL at 06/30/20 0844    sodium chloride flush 0.9 % injection 10 mL  10 mL Intravenous PRN Fang aCba MD        gabapentin (NEURONTIN) capsule 300 mg  300 mg Oral TID Fang Caba MD   300 mg at 06/30/20 0840    ipratropium-albuterol (DUONEB) nebulizer solution 1 ampule  1 ampule Inhalation Q4H WA Fang Caba MD   1 ampule at 06/30/20 1018    hydrALAZINE (APRESOLINE) injection 10 mg  10 mg Intravenous Q4H PRN Steffanie Ray MD   10 mg at 06/27/20 0130    morphine injection 1 mg  1 mg Intravenous Q4H PRN Steffanie Ray MD   1 mg at 06/26/20 0211    morphine injection 2 mg  2 mg Intravenous Q4H PRN Steffanie Ray MD   2 mg at 06/27/20 0208    naloxone (NARCAN) injection 0.4 mg  0.4 mg Intravenous PRN Steffanie Ray MD           Past Medical History:  Past Medical History:   Diagnosis Date    Hypertension     Palliative care patient 06/26/2020    Sleep apnea     Thyroid disease     Trigeminal neuropathy        Past Surgical History:  Past Surgical History:   Procedure Laterality Date    CHOLECYSTECTOMY      HERNIA REPAIR         Family History  No family history on file.     Social History  Social History     Socioeconomic History    Marital status:      Spouse name: Ronel Caban    Number of children: 2    Years of education: Not on file    Highest education level: Not on file   Occupational History    Not on file   Social Needs    Financial resource strain: Not on file    Food insecurity     Worry: Not on file     Inability: Not on file    Transportation needs     Medical: Not on file     Non-medical: Not on file   Tobacco Use    Smoking status: Former Smoker     Types: Cigarettes    Smokeless tobacco: Never Used   Substance and Sexual Activity    Alcohol use: Not on file    Drug use: Never    Sexual activity: Not Currently     Partners: Male   Lifestyle    Physical activity     Days per week: Not on file     Minutes per session: Not on file    Stress: Not on file   Relationships    Social connections     Talks on phone: Not on file     Gets together: Not on file     Attends [employer] service: Not on file     Active member of club or organization: Not on file     Attends meetings of clubs or organizations: Not on file     Relationship status: Not on file    Intimate partner violence     Fear of current or ex partner: Not on file Emotionally abused: Not on file     Physically abused: Not on file     Forced sexual activity: Not on file   Other Topics Concern    Not on file   Social History Narrative    Not on file         Review of Systems:  History obtained from chart review and the patient  General ROS: + fever or chills  Respiratory ROS: No cough, shortness of breath, wheezing  Cardiovascular ROS: No chest pain or palpitations  Gastrointestinal ROS: No abdominal pain or melena  Genito-Urinary ROS: No dysuria or hematuria  Musculoskeletal ROS: No joint pain or swelling         Objective:  Patient Vitals for the past 24 hrs:   BP Temp Temp src Pulse Resp SpO2   06/30/20 1020 (!) 141/70 97 °F (36.1 °C) Temporal 61 16 94 %   06/30/20 0654 118/72 98.6 °F (37 °C) Temporal 55 20 100 %   06/30/20 0100 -- 98.4 °F (36.9 °C) Oral -- -- --   06/30/20 0012 (!) 159/73 99.5 °F (37.5 °C) Temporal 73 18 92 %   06/29/20 1858 (!) 176/82 99.4 °F (37.4 °C) Temporal 70 18 96 %   06/29/20 1505 (!) 159/80 99.8 °F (37.7 °C) Temporal 92 16 91 %   06/29/20 1414 -- -- -- -- 16 94 %       Intake/Output Summary (Last 24 hours) at 6/30/2020 1127  Last data filed at 6/30/2020 0904  Gross per 24 hour   Intake 1180 ml   Output 850 ml   Net 330 ml     General: awake/alert   Chest:  clear to auscultation bilaterally without respiratory distress  CVS: regular rate and rhythm  Abdominal: soft, nontender, normal bowel sounds  Extremities: no cyanosis or edema  Skin: warm/dry    Labs:  BMP:   Recent Labs     06/28/20  0411 06/29/20  0334 06/30/20  0330   * 139 144   K 3.8  3.8 3.6  3.6 3.2*    106 110   CO2 23 20* 21*   PHOS  --   --  2.5   BUN 20 17 11   CREATININE 1.8* 1.6* 1.3*   CALCIUM 9.0 9.0 8.8     CBC:   Recent Labs     06/28/20  0411 06/29/20  0334 06/30/20  0330   WBC 4.3* 6.1 9.4   HGB 13.1* 13.1* 12.9*   HCT 37.7* 39.6* 40.8*   MCV 91.3 95.2* 97.1*   PLT 60* 79* 101*     LIVER PROFILE:   Recent Labs     06/28/20  0411 06/29/20  0334 06/30/20  0330 most likely due to chronic microvascular disease. Ventricles, sulci, basilar cisterns are proportionally prominent suggesting volume loss. Thinning of the ocular lenses may be postoperative or age-related. Paranasal sinuses and mastoid air cells normally aerated. External auditory canals within normal limits. Calvarium appears intact. Subcutaneous tissues within normal limits. 1. No acute intracranial hemorrhage, midline shift or mass effect. 2. Confluent periventricular and subcortical white matter hypodensities with volume loss, most commonly chronic microvascular changes. If clinical concern for acute infarct, MRI would be a more sensitive exam. Signed by Dr Shazia Galdamez on 6/25/2020 4:22 PM    Us Renal Complete    Result Date: 6/27/2020   RENAL COMPLETE - 6/27/2020 9:30 AM Indication: Acute kidney injury Comparison: None available. Findings: RIGHT kidney measures 9 cm in length. RIGHT renal cortical thickness and echogenicity are within normal limits. No shadowing calculi or hydronephrosis. LEFT kidney measures 9.5 cm in length. LEFT renal cortical thickness and echogenicity are within normal limits. 2.1 cm cyst in the LEFT kidney upper pole without evidence of internal vascularity or complex features. No urolithiasis or hydronephrosis. Urinary bladder is decompressed by Rojas catheter. 1.  Negative for hydronephrosis or renal atrophy. 2.  2.1 cm cyst in the LEFT kidney without complex features. Signed by Dr Bekah King on 6/27/2020 12:05 PM    Xr Chest Portable    Result Date: 6/25/2020  EXAMINATION:  XR CHEST PORTABLE  6/25/2020 1:12 PM HISTORY: Shortness of breath. COMPARISON: No comparison study. FINDINGS:  There are patchy infiltrates in the perihilar regions and lung bases. There is minimal blunting of the costophrenic angles. Heart size is upper limits of normal. Thoracic aorta is atheromatous. 1. Bilateral infiltrates likely representing pulmonary edema. Pneumonia is possible.  2. There may be small pleural effusions. Heart size is upper limits of normal. Signed by Dr Christiane Vazquez on 6/25/2020 1:23 PM    Vl Dup Lower Extremity Venous Bilateral    Result Date: 6/25/2020  Vascular Lower Extremities DVT Study Procedure  Demographics   Patient Name     Lita Kennedy Age                    [de-identified]   Patient Number   [id number]       Gender                 Male   Visit Number     [id number]    Interpreting Physician Antoinette Alexander MD   Date of Birth    1940   Referring Physician    Noemí Bio   Accession Number [id number]   [address], RVT  Procedure Type of Study:   Veins:Lower Extremities DVT Study, VL LOWER EXTREMITY BILATERAL VENOUS  DUPLEX . Technical Quality:Limited visualization due to combative patient. Impression   Unable to complete exam because patient was shaking uncontrollably. Only  right common femoral vein and saphenofemoral junction were able to be  imaged. No DVT/SVT seen in these areas. The remainder of the right leg  could not be imaged and therefore could not \"rule out\" deep or superficial  venous thrombosis. Signature   ----------------------------------------------------------------  Electronically signed by Antoinette Alexander MD(Interpreting  physician) on 06/25/2020 05:28 PM  ----------------------------------------------------------------  Velocities are measured in cm/s ; Diameters are measured in mm Right Lower Extremities DVT Study Measurements Right 2D Measurements +------------------------------------+----------+---------------+----------+ ! Location                            ! Visualized! Compressibility! Thrombosis! +------------------------------------+----------+---------------+----------+ ! Sapheno Femoral Junction            ! Yes       ! Yes            ! None      ! +------------------------------------+----------+---------------+----------+ ! Common Femoral                      !Yes       ! Yes            ! None      ! +------------------------------------+----------+---------------+----------+    Cta Pulmonary W Contrast    Result Date: 6/25/2020  CTA PULMONARY W CONTRAST 6/25/2020 11:30 AM HISTORY: Elevated d-dimer. Shortness of breath. Clinical finding is worrisome for pulmonary embolus Comparison: None Technique: CT evaluation of the chest was performed with intravenous contrast. 2 mm transaxial images were obtained. Maximum intensity projection reconstruction angiographic images were generated . Two-dimensional Coronal and sagittal reconstruction images of the pulmonary arteries, aorta and great vessels were also generated. .Radiation dose equals  mGy cm. AUTOMATED EXPOSURE CONTROL dose reduction technique was implemented Findings: There is image quadrant degradation related to breathing motion artifact. Pulmonary arteries opacify with iodinated contrast without intraluminal filling defects or convincing CT angiographic evidence of pulmonary embolus. The aorta is normal in caliber, without aneurysm or dissection. There is ectasia of the aortic arch with aortic calcifications. There is cardiomegaly with coronary artery calcifications. There are calcified left hilar lymph nodes. There is no mediastinal hilar lymphadenopathy. There are small bilateral pleural effusions with lower lobe airspace opacities, suspect atelectasis. There are emphysematous changes in the upper lobes with bleb formation. Evaluation of the lung fields challenging due to motion, however no definite consolidating parenchymal infiltrates identified. Thyroid gland enlargement with nodules. Left-sided nodule measures 2.3 cm. Right-sided nodule measures 1.6 cm. There is no axillary lymph node enlargement. Limited imaging the upper abdomen demonstrate no acute abnormality. There is pectus excavatum anterior chest wall deformity. No acute osseous abnormalities observed. Impression: 1.  No CT angiographic evidence of pulmonary embolus or acute aortic pathology. 2. Small bilateral pleural effusions with associated airspace opacities likely atelectasis. 3. Cardiomegaly with atherosclerotic calcifications. 4. Thyroid enlargement with nodules. 5. Pectus excavatum anterior chest wall deformity.  Signed by Dr Mark Diaz on 6/25/2020 4:26 PM       Assessment   Acute kidney injury-uncertain baseline  Possible ehrlichiosis  Metabolic acidosis  Hypertension  Leukopenia/neutropenia  Thrombocytopenia  Elevated transaminases  Microscopic hematuria    Plan:  Antibiotics per ID service  Monitor blood pressure trends  Monitor labs  wean IV fluids  Repeat urinalysis  Replace K, mag was adequate  Discussed with patient, family, Torie Vaughan MD  06/30/20  11:27 AM

## 2020-06-30 NOTE — PROGRESS NOTES
Assistance: Needs assistance  Additional Comments: per chart review, pt lives with wife and appears to have been ind with moiblity prior to this incident       Objective   Vision: Within Functional Limits  Hearing: Within functional limits    Orientation  Overall Orientation Status: Impaired  Orientation Level: Oriented to person;Disoriented to place; Disoriented to situation;Disoriented to time        ADL  Feeding: Minimal assistance  Grooming: Minimal assistance  UE Bathing: Moderate assistance  LE Bathing: Moderate assistance  UE Dressing: Moderate assistance  LE Dressing: Maximum assistance  Toileting: Maximum assistance        Bed mobility  Supine to Sit: Moderate assistance  Transfers  Stand Step Transfers: Minimal assistance; Moderate assistance  Sit to stand: Minimal assistance  Transfer Comments: Min-Mod A sidestepping to [address] [place] [address]  Overall Cognitive Status: Exceptions  Arousal/Alertness: Inconsistent responses to stimuli  Following Commands: Inconsistently follows commands  Attention Span: Difficulty attending to directions; Difficulty dividing attention  Memory: Decreased short term memory;Decreased long term memory  Insights: Not aware of deficits  Initiation: Requires cues for all  Sequencing: Requires cues for all                 LUE AROM (degrees)  LUE AROM : WFL  RUE AROM (degrees)  RUE AROM : WFL  LUE Strength  Gross LUE Strength: WFL  RUE Strength  Gross RUE Strength: WFL                   Plan   Plan  Times per week: 3-5x/week  Times per day: Daily    G-Code     OutComes Score                                                  AM-PAC Score             Goals  Short term goals  Time Frame for Short term goals: 1 week  Short term goal 1: CGA consistently with toilet tfers  Short term goal 2: Tolerate standing 60 seconds with CGA and no lob to assist with ADLs  Short term goal 3: Pt. feed self 5 bites of food with supervision and no spills  Long term goals  Long term goal 1: Return to [facility] Therapy Time   Individual Concurrent Group Co-treatment   Time In           Time Out           Minutes                   Jana Zimmerman, OT

## 2020-06-30 NOTE — PROGRESS NOTES
Patient disoriented x4, continues to try to get out of bed and pulling off external olivas, new external olivas placed on patient. Patient reoriented to situation each time. Patient's nephew sitting at bedside.

## 2020-07-01 LAB
ALBUMIN SERPL-MCNC: 3.1 G/DL (ref 3.5–5.2)
ALP BLD-CCNC: 210 U/L (ref 40–130)
ALT SERPL-CCNC: 120 U/L (ref 5–41)
ANION GAP SERPL CALCULATED.3IONS-SCNC: 11 MMOL/L (ref 7–19)
AST SERPL-CCNC: 167 U/L (ref 5–40)
BETA GLOBULIN: 0 AU/ML (ref 0–19)
BILIRUB SERPL-MCNC: 1.1 MG/DL (ref 0.2–1.2)
BUN BLDV-MCNC: 11 MG/DL (ref 8–23)
CALCIUM SERPL-MCNC: 8.8 MG/DL (ref 8.8–10.2)
CHLORIDE BLD-SCNC: 109 MMOL/L (ref 98–111)
CO2: 21 MMOL/L (ref 22–29)
CREAT SERPL-MCNC: 1.2 MG/DL (ref 0.5–1.2)
CYTOMEGALOVIRUS IGG ANTIBODY: 22.1
CYTOMEGALOVIRUS IGM ANTIBODY: 0.1
GFR NON-AFRICAN AMERICAN: 58
GLUCOSE BLD-MCNC: 100 MG/DL (ref 74–109)
GLUCOSE BLD-MCNC: 102 MG/DL (ref 70–99)
PERFORMED ON: ABNORMAL
POTASSIUM REFLEX MAGNESIUM: 3.6 MMOL/L (ref 3.5–5)
SODIUM BLD-SCNC: 141 MMOL/L (ref 136–145)
TOTAL PROTEIN: 5.1 G/DL (ref 6.6–8.7)

## 2020-07-01 PROCEDURE — 2140000000 HC CCU INTERMEDIATE R&B

## 2020-07-01 PROCEDURE — 6370000000 HC RX 637 (ALT 250 FOR IP): Performed by: HOSPITALIST

## 2020-07-01 PROCEDURE — 6370000000 HC RX 637 (ALT 250 FOR IP): Performed by: INTERNAL MEDICINE

## 2020-07-01 PROCEDURE — 2500000003 HC RX 250 WO HCPCS: Performed by: INTERNAL MEDICINE

## 2020-07-01 PROCEDURE — 94640 AIRWAY INHALATION TREATMENT: CPT

## 2020-07-01 PROCEDURE — 97110 THERAPEUTIC EXERCISES: CPT

## 2020-07-01 PROCEDURE — 2580000003 HC RX 258: Performed by: INTERNAL MEDICINE

## 2020-07-01 PROCEDURE — 97116 GAIT TRAINING THERAPY: CPT

## 2020-07-01 PROCEDURE — 82947 ASSAY GLUCOSE BLOOD QUANT: CPT

## 2020-07-01 PROCEDURE — 80053 COMPREHEN METABOLIC PANEL: CPT

## 2020-07-01 PROCEDURE — 2580000003 HC RX 258: Performed by: HOSPITALIST

## 2020-07-01 PROCEDURE — 99232 SBSQ HOSP IP/OBS MODERATE 35: CPT | Performed by: NURSE PRACTITIONER

## 2020-07-01 PROCEDURE — 36415 COLL VENOUS BLD VENIPUNCTURE: CPT

## 2020-07-01 PROCEDURE — 6370000000 HC RX 637 (ALT 250 FOR IP): Performed by: NURSE PRACTITIONER

## 2020-07-01 RX ORDER — DOXYCYCLINE 50 MG/1
100 CAPSULE ORAL EVERY 12 HOURS SCHEDULED
Status: DISCONTINUED | OUTPATIENT
Start: 2020-07-01 | End: 2020-07-03 | Stop reason: HOSPADM

## 2020-07-01 RX ORDER — AMLODIPINE BESYLATE 5 MG/1
5 TABLET ORAL DAILY
Status: DISCONTINUED | OUTPATIENT
Start: 2020-07-01 | End: 2020-07-02

## 2020-07-01 RX ADMIN — IPRATROPIUM BROMIDE AND ALBUTEROL SULFATE 1 AMPULE: .5; 3 SOLUTION RESPIRATORY (INHALATION) at 06:28

## 2020-07-01 RX ADMIN — HYDROXYZINE HYDROCHLORIDE 12.5 MG: 25 TABLET, FILM COATED ORAL at 23:40

## 2020-07-01 RX ADMIN — IPRATROPIUM BROMIDE AND ALBUTEROL SULFATE 1 AMPULE: .5; 3 SOLUTION RESPIRATORY (INHALATION) at 10:32

## 2020-07-01 RX ADMIN — LEVOTHYROXINE SODIUM 50 MCG: 50 TABLET ORAL at 06:09

## 2020-07-01 RX ADMIN — DOXYCYCLINE 100 MG: 50 CAPSULE ORAL at 19:32

## 2020-07-01 RX ADMIN — LAMOTRIGINE 25 MG: 25 TABLET ORAL at 09:11

## 2020-07-01 RX ADMIN — ACETAMINOPHEN 650 MG: 325 TABLET, FILM COATED ORAL at 01:37

## 2020-07-01 RX ADMIN — LISINOPRIL 20 MG: 20 TABLET ORAL at 09:11

## 2020-07-01 RX ADMIN — AMLODIPINE BESYLATE 5 MG: 5 TABLET ORAL at 15:49

## 2020-07-01 RX ADMIN — ACETAMINOPHEN 650 MG: 325 TABLET, FILM COATED ORAL at 23:40

## 2020-07-01 RX ADMIN — ASPIRIN 81 MG 81 MG: 81 TABLET ORAL at 09:11

## 2020-07-01 RX ADMIN — SODIUM CHLORIDE, PRESERVATIVE FREE 10 ML: 5 INJECTION INTRAVENOUS at 19:33

## 2020-07-01 RX ADMIN — DOXYCYCLINE 100 MG: 100 INJECTION, POWDER, LYOPHILIZED, FOR SOLUTION INTRAVENOUS at 06:09

## 2020-07-01 RX ADMIN — GABAPENTIN 300 MG: 300 CAPSULE ORAL at 15:49

## 2020-07-01 RX ADMIN — GABAPENTIN 300 MG: 300 CAPSULE ORAL at 09:11

## 2020-07-01 RX ADMIN — MELATONIN 3 MG: at 19:32

## 2020-07-01 RX ADMIN — GABAPENTIN 300 MG: 300 CAPSULE ORAL at 19:32

## 2020-07-01 RX ADMIN — SODIUM CHLORIDE, PRESERVATIVE FREE 10 ML: 5 INJECTION INTRAVENOUS at 09:15

## 2020-07-01 RX ADMIN — IPRATROPIUM BROMIDE AND ALBUTEROL SULFATE 1 AMPULE: .5; 3 SOLUTION RESPIRATORY (INHALATION) at 14:38

## 2020-07-01 ASSESSMENT — ENCOUNTER SYMPTOMS
SHORTNESS OF BREATH: 0
GASTROINTESTINAL NEGATIVE: 1
NAUSEA: 0
EYES NEGATIVE: 1
BACK PAIN: 0
RESPIRATORY NEGATIVE: 1
DIARRHEA: 0
VOMITING: 0
CONSTIPATION: 0
EYE PAIN: 1

## 2020-07-01 ASSESSMENT — PAIN SCALES - GENERAL
PAINLEVEL_OUTOF10: 10
PAINLEVEL_OUTOF10: 4

## 2020-07-01 ASSESSMENT — PAIN DESCRIPTION - PAIN TYPE: TYPE: CHRONIC PAIN

## 2020-07-01 ASSESSMENT — PAIN DESCRIPTION - LOCATION: LOCATION: JAW

## 2020-07-01 NOTE — PROGRESS NOTES
Hospitalist Progress Note    Patient:  Chantel Tuttle  YOB: 1940  Date of Service: 7/1/2020  MRN: [id number]   Acct: [de-identified]   Primary Care Physician: No primary care provider on file. Advance Directive: Full Code  Admit Date: 6/25/2020       Hospital Day: 6  Referring Provider: Bhakti Monroy DO    Patient Seen, Chart, Consults, Notes, Labs, Radiology studies reviewed. Subjective: Joseph Spencer is a [de-identified] y.o. male  whom we are following for acute kidney injury, febrile illness, syncope, symptomatic bradycardia. His renal function is improving. His fever has also resolved. High index of suspicion for acute tickborne illness. Ehrlichia and [facility] spotted fever titers are pending. He is feeling significantly better    Allergies:  Patient has no known allergies.     Medicines:  Current Facility-Administered Medications   Medication Dose Route Frequency Provider Last Rate Last Dose    potassium chloride (KLOR-CON M) extended release tablet 40 mEq  40 mEq Oral PRN Ama Zamora MD        Or    potassium bicarb-citric acid (EFFER-K) effervescent tablet 40 mEq  40 mEq Oral PRN Ama Zamora MD   40 mEq at 06/30/20 1002    Or    potassium chloride 10 mEq/100 mL IVPB (Peripheral Line)  10 mEq Intravenous PRN Ama Zamora MD        magnesium sulfate 1 g in dextrose 5% 100 mL IVPB  1 g Intravenous PRN Ama Zamora MD        acetaminophen (TYLENOL) tablet 650 mg  650 mg Oral Q4H PRN Marvel Amaya MD   650 mg at 07/01/20 0137    melatonin tablet 3 mg  3 mg Oral Nightly Elizabeth Geiger MD   3 mg at 06/30/20 2036    hydrOXYzine (ATARAX) tablet 12.5 mg  12.5 mg Oral Q6H PRN [place], APRN   12.5 mg at 06/29/20 1739    lisinopril (PRINIVIL;ZESTRIL) tablet 20 mg  20 mg Oral Daily Casey Luz MD   20 mg at 07/01/20 0911    doxycycline (VIBRAMYCIN) 100 mg in dextrose 5 % 100 mL IVPB  100 mg Intravenous Q12H Marvel Amaya MD   Stopped at 07/01/20 0709    aspirin chewable tablet 81 mg  81 mg Oral Daily Myles Centeno MD   81 mg at 07/01/20 0911    polyethylene glycol (GLYCOLAX) packet 17 g  17 g Oral Daily PRN Myles Centeno MD        ondansetron (ZOFRAN-ODT) disintegrating tablet 4 mg  4 mg Oral Q8H PRN Myles Centeno MD        Or    [facility] injection 4 mg  4 mg Intravenous Q6H PRN Myles Centeno MD        nitroGLYCERIN (NITROSTAT) SL tablet 0.4 mg  0.4 mg Sublingual Q5 Min PRN Myles Centeno MD        lamoTRIgine (LAMICTAL) tablet 25 mg  25 mg Oral Daily Myles Centeno MD   25 mg at 07/01/20 0911    levothyroxine (SYNTHROID) tablet 50 mcg  50 mcg Oral Daily Myles Centeno MD   50 mcg at 07/01/20 4597    sodium chloride flush 0.9 % injection 10 mL  10 mL Intravenous 2 times per day Myles Centeno MD   10 mL at 07/01/20 0915    sodium chloride flush 0.9 % injection 10 mL  10 mL Intravenous PRN Myles Centeno MD        gabapentin (NEURONTIN) capsule 300 mg  300 mg Oral TID Myles Centeno MD   300 mg at 07/01/20 0911    ipratropium-albuterol (DUONEB) nebulizer solution 1 ampule  1 ampule Inhalation Q4H WA Myles Centeno MD   1 ampule at 07/01/20 1032    hydrALAZINE (APRESOLINE) injection 10 mg  10 mg Intravenous Q4H PRN Myles Centeno MD   10 mg at 06/27/20 0130    morphine injection 1 mg  1 mg Intravenous Q4H PRN Myles Centeno MD   1 mg at 06/26/20 0211    morphine injection 2 mg  2 mg Intravenous Q4H PRN Myles Centeno MD   2 mg at 06/27/20 0208    naloxone (NARCAN) injection 0.4 mg  0.4 mg Intravenous PRN Myles Centeno MD           Past Medical History:  Past Medical History:   Diagnosis Date    Hypertension     Palliative care patient 06/26/2020    Sleep apnea     Thyroid disease     Trigeminal neuropathy        Past Surgical History:  Past Surgical History:   Procedure Laterality Date    CHOLECYSTECTOMY      HERNIA REPAIR         Family History  No family history on file.     Social History  Social History     Socioeconomic History    Marital status:      Spouse name: Gabriel Reyes    Number of children: 2    Years of education: Not on file    Highest education level: Not on file   Occupational History    Not on file   Social Needs    Financial resource strain: Not on file    Food insecurity     Worry: Not on file     Inability: Not on file    Transportation needs     Medical: Not on file     Non-medical: Not on file   Tobacco Use    Smoking status: Former Smoker     Types: Cigarettes    Smokeless tobacco: Never Used   Substance and Sexual Activity    Alcohol use: Not on file    Drug use: Never    Sexual activity: Not Currently     Partners: Male   Lifestyle    Physical activity     Days per week: Not on file     Minutes per session: Not on file    Stress: Not on file   Relationships    Social connections     Talks on phone: Not on file     Gets together: Not on file     Attends Yazidi service: Not on file     Active member of club or organization: Not on file     Attends meetings of clubs or organizations: Not on file     Relationship status: Not on file    Intimate partner violence     Fear of current or ex partner: Not on file     Emotionally abused: Not on file     Physically abused: Not on file     Forced sexual activity: Not on file   Other Topics Concern    Not on file   Social History Narrative    Not on file         Review of Systems:    Review of Systems   Constitutional: Positive for activity change. Negative for fever. Respiratory: Negative for shortness of breath. Cardiovascular: Negative for chest pain and leg swelling. Gastrointestinal: Negative for constipation, diarrhea, nausea and vomiting. Genitourinary: Negative for difficulty urinating and dysuria. Musculoskeletal: Negative for back pain and neck pain. Neurological: Negative for dizziness and light-headedness. Objective:  Blood pressure (!) 166/76, pulse 58, temperature 98.3 °F (36.8 °C), temperature source Temporal, resp.  rate 16, height 6' (1.829 m), weight 172 lb 1.6 oz (78.1 kg), SpO2 92 %. Intake/Output Summary (Last 24 hours) at 7/1/2020 1253  Last data filed at 7/1/2020 0845  Gross per 24 hour   Intake 240 ml   Output 1425 ml   Net -1185 ml       Physical Exam  Vitals signs reviewed. Constitutional:       Appearance: He is well-developed. HENT:      Head: Normocephalic and atraumatic. Eyes:      Conjunctiva/sclera: Conjunctivae normal.      Pupils: Pupils are equal, round, and reactive to light. Cardiovascular:      Rate and Rhythm: Normal rate and regular rhythm. Heart sounds: Normal heart sounds. Pulmonary:      Effort: Pulmonary effort is normal.      Breath sounds: Normal breath sounds. Abdominal:      General: Abdomen is flat. Palpations: Abdomen is soft. Skin:     General: Skin is warm and dry. Neurological:      Mental Status: He is alert and oriented to person, place, and time. Labs:  BMP:   Recent Labs     06/29/20  0334 06/30/20 0330 07/01/20  0132    144 141   K 3.6  3.6 3.2* 3.6    110 109   CO2 20* 21* 21*   PHOS  --  2.5  --    BUN 17 11 11   CREATININE 1.6* 1.3* 1.2   CALCIUM 9.0 8.8 8.8     CBC:   Recent Labs     06/29/20  0334 06/30/20  0330   WBC 6.1 9.4   HGB 13.1* 12.9*   HCT 39.6* 40.8*   MCV 95.2* 97.1*   PLT 79* 101*     LIVER PROFILE:   Recent Labs     06/29/20  0334 06/30/20  0330 07/01/20  0132   * 275* 167*   * 160* 120*   BILITOT 1.3* 1.1 1.1   ALKPHOS 206* 228* 210*     PT/INR: No results for input(s): PROTIME, INR in the last 72 hours. APTT: No results for input(s): APTT in the last 72 hours. BNP:  No results for input(s): BNP in the last 72 hours. Ionized Calcium:No results for input(s): IONCA in the last 72 hours. Magnesium:  Recent Labs     06/30/20  0330   MG 2.0     Phosphorus:  Recent Labs     06/30/20 0330   PHOS 2.5     HgbA1C: No results for input(s): LABA1C in the last 72 hours.   Hepatic:   Recent Labs     06/29/20 0334 06/30/20  0330 07/01/20  0132   ALKPHOS 206* 228* 210*   * 160* 120*   * 275* 167*   PROT 5.2* 5.0* 5.1*   BILITOT 1.3* 1.1 1.1   LABALBU 3.2* 3.3* 3.1*     Lactic Acid: No results for input(s): LACTA in the last 72 hours. Troponin: No results for input(s): CKTOTAL, CKMB, TROPONINT in the last 72 hours. ABGs: No results for input(s): PH, PCO2, PO2, HCO3, O2SAT in the last 72 hours. CRP:  No results for input(s): CRP in the last 72 hours. Sed Rate:  No results for input(s): SEDRATE in the last 72 hours. Cultures:   No results for input(s): CULTURE in the last 72 hours. No results for input(s): BC, Celine Poet in the last 72 hours. No results for input(s): CXSURG in the last 72 hours. Radiology reports as per the Radiologist  Radiology: Ct Head Wo Contrast    Result Date: 6/25/2020  EXAM: CT HEAD WO CONTRAST -- 6/25/2020 2:15 PM HISTORY: 79 years, Male, confusion COMPARISON: None DLP: 805 mGy cm. Automated exposure control was utilized to minimize patient radiation dose. TECHNIQUE: Unenhanced axial CT images obtained from vertex to skull base with coronal and sagittal reformats. FINDINGS: No acute intracranial hemorrhage, midline shift, mass effect or large territory cortical infarct. Confluent periventricular and subcortical white matter hypodensities, most likely due to chronic microvascular disease. Ventricles, sulci, basilar cisterns are proportionally prominent suggesting volume loss. Thinning of the ocular lenses may be postoperative or age-related. Paranasal sinuses and mastoid air cells normally aerated. External auditory canals within normal limits. Calvarium appears intact. Subcutaneous tissues within normal limits. 1. No acute intracranial hemorrhage, midline shift or mass effect. 2. Confluent periventricular and subcortical white matter hypodensities with volume loss, most commonly chronic microvascular changes.  If clinical concern for acute infarct, MRI would be a more Study Measurements Right 2D Measurements +------------------------------------+----------+---------------+----------+ ! Location                            ! Visualized! Compressibility! Thrombosis! +------------------------------------+----------+---------------+----------+ ! Sapheno Femoral Junction            ! Yes       ! Yes            ! None      ! +------------------------------------+----------+---------------+----------+ ! Common Femoral                      !Yes       ! Yes            ! None      ! +------------------------------------+----------+---------------+----------+    Cta Pulmonary W Contrast    Result Date: 6/25/2020  CTA PULMONARY W CONTRAST 6/25/2020 11:30 AM HISTORY: Elevated d-dimer. Shortness of breath. Clinical finding is worrisome for pulmonary embolus Comparison: None Technique: CT evaluation of the chest was performed with intravenous contrast. 2 mm transaxial images were obtained. Maximum intensity projection reconstruction angiographic images were generated . Two-dimensional Coronal and sagittal reconstruction images of the pulmonary arteries, aorta and great vessels were also generated. .Radiation dose equals  mGy cm. AUTOMATED EXPOSURE CONTROL dose reduction technique was implemented Findings: There is image quadrant degradation related to breathing motion artifact. Pulmonary arteries opacify with iodinated contrast without intraluminal filling defects or convincing CT angiographic evidence of pulmonary embolus. The aorta is normal in caliber, without aneurysm or dissection. There is ectasia of the aortic arch with aortic calcifications. There is cardiomegaly with coronary artery calcifications. There are calcified left hilar lymph nodes. There is no mediastinal hilar lymphadenopathy. There are small bilateral pleural effusions with lower lobe airspace opacities, suspect atelectasis. There are emphysematous changes in the upper lobes with bleb formation.  Evaluation of the lung fields

## 2020-07-01 NOTE — PROGRESS NOTES
07/01/20 1020   Restrictions/Precautions   Restrictions/Precautions Fall Risk   General   Chart Reviewed Yes   Subjective   Subjective Patient in bed agrees to therapy   Pain Assessment   Pain Assessment 0-10   Pain Level 10   Pain Type Chronic pain  (Nerve)   Pain Location Jaw   Vital Signs   Level of Consciousness 0   Oxygen Therapy   O2 Device None (Room air)   Orientation   Overall Orientation Status WNL   Bed Mobility   Supine to Sit Stand by assistance   Transfers   Sit to Stand Minimal Assistance   Stand to sit Minimal Assistance   Ambulation   Ambulation? Yes   Ambulation 1   Surface level tile   Device Rolling Walker   Other Apparatus Wheelchair follow   Assistance Moderate assistance   Quality of Gait Poor constant cues for proper gait sequence  overall  unsteady   Gait Deviations Slow Yazmin;Decreased step length; Increased NASEEM   Distance 10'   Comments Patient walked untill fatigued sat in chair. Balance   Posture Fair   Sitting - Static Fair   Sitting - Dynamic Fair   Standing - Static Fair;-   Standing - Dynamic Poor   Exercises   Heelslides 10   Hip Flexion 10   Hip Abduction 10   Knee Long Arc Quad 10   Knee Active Range of Motion yes   Ankle Pumps 10   Conditions Requiring Skilled Therapeutic Intervention   Body structures, Functions, Activity limitations Decreased functional mobility ; Decreased cognition;Decreased safe awareness;Decreased coordination;Decreased endurance;Decreased fine motor control   Activity Tolerance   Activity Tolerance Patient Tolerated treatment well   Safety Devices   Type of devices Call light within reach; Left in chair  (Wife present)   Physical Therapy    Electronically signed by Daja Roth PTA on 7/1/2020 at 10:39 AM

## 2020-07-01 NOTE — PROGRESS NOTES
INFECTIOUS DISEASES PROGRESS NOTE    Patient:  Renato Negron  YOB: 1940  MRN: [id number]   Admit date: 6/25/2020   Admitting Physician: Mitali Lenz DO  Primary Care Physician: No primary care provider on file. CHIEF COMPLAINT: \"Eyes are bothering me\"      Interval History: Patient reports his eyes are bothering him. He reports he usually has eyedrops at home. He does not know if they are prescription or over-the-counter. \"My wife would know\"      Allergies: No Known Allergies    Current Meds: amLODIPine (NORVASC) tablet 5 mg, Daily  potassium chloride (KLOR-CON M) extended release tablet 40 mEq, PRN    Or  potassium bicarb-citric acid (EFFER-K) effervescent tablet 40 mEq, PRN    Or  potassium chloride 10 mEq/100 mL IVPB (Peripheral Line), PRN  magnesium sulfate 1 g in dextrose 5% 100 mL IVPB, PRN  acetaminophen (TYLENOL) tablet 650 mg, Q4H PRN  melatonin tablet 3 mg, Nightly  hydrOXYzine (ATARAX) tablet 12.5 mg, Q6H PRN  lisinopril (PRINIVIL;ZESTRIL) tablet 20 mg, Daily  doxycycline (VIBRAMYCIN) 100 mg in dextrose 5 % 100 mL IVPB, Q12H  aspirin chewable tablet 81 mg, Daily  polyethylene glycol (GLYCOLAX) packet 17 g, Daily PRN  ondansetron (ZOFRAN-ODT) disintegrating tablet 4 mg, Q8H PRN    Or  ondansetron (ZOFRAN) injection 4 mg, Q6H PRN  nitroGLYCERIN (NITROSTAT) SL tablet 0.4 mg, Q5 Min PRN  lamoTRIgine (LAMICTAL) tablet 25 mg, Daily  levothyroxine (SYNTHROID) tablet 50 mcg, Daily  sodium chloride flush 0.9 % injection 10 mL, 2 times per day  sodium chloride flush 0.9 % injection 10 mL, PRN  gabapentin (NEURONTIN) capsule 300 mg, TID  ipratropium-albuterol (DUONEB) nebulizer solution 1 ampule, Q4H WA  hydrALAZINE (APRESOLINE) injection 10 mg, Q4H PRN  morphine injection 1 mg, Q4H PRN  morphine injection 2 mg, Q4H PRN  naloxone (NARCAN) injection 0.4 mg, PRN        Review of Systems   Constitutional: Negative for fever. Eyes: Positive for pain.        Vital Signs:  BP (!) 166/76   Pulse 58   Temp 98.3 °F (36.8 °C) (Temporal)   Resp 16   Ht 6' (1.829 m)   Wt 172 lb 1.6 oz (78.1 kg)   SpO2 92%   BMI 23.34 kg/m²   Temp (24hrs), Av.6 °F (37 °C), Min:98.3 °F (36.8 °C), Max:98.9 °F (37.2 °C)      Physical Exam  General: The patient is an elderly male, sitting up in the chair in no acute distress  HEENT: Sclera anicteric they are somewhat injected mainly along the lower lid. Neck: Without meningismus  Respiratory: Effort even and unlabored  Abdomen: Soft, bowel sounds are positive, nontender  Neuro: He was following simple commands without difficulty. Speech was clear  Psych: He was pleasant and cooperative    Line/IV site: No erythema,warmth, induration, or tenderness. Left upper extremity    LAB RESULTS:    CBC with DIFF:  Recent Labs     20  0334 20  0330   WBC 6.1 9.4   RBC 4.16* 4.20*   HGB 13.1* 12.9*   HCT 39.6* 40.8*   MCV 95.2* 97.1*   MCH 31.5* 30.7   MCHC 33.1 31.6*   RDW 13.7 14.1   PLT 79* 101*   MPV 12.7* 11.7   NEUTOPHILPCT 17.0* 39.0*   LYMPHOPCT 58.0* 60.0*   MONOPCT 17.0* 0.0   EOSRELPCT 0.0 1.0   BASOPCT 0.0 0.0   NEUTROABS 1.0* 3.7   LYMPHSABS 4.0 5.6*   MONOSABS 1.00* 0.00   EOSABS 0.00 0.09   BASOSABS 0.00 0.00       CMP/BMP:  Recent Labs     20  0334 20  0330 20  0132    144 141   K 3.6  3.6 3.2* 3.6    110 109   CO2 20* 21* 21*   ANIONGAP 13 13 11   GLUCOSE 121* 97 100   BUN 17 11 11   CREATININE 1.6* 1.3* 1.2   LABGLOM 42* 53* 58*   CALCIUM 9.0 8.8 8.8   PROT 5.2* 5.0* 5.1*   LABALBU 3.2* 3.3* 3.1*   BILITOT 1.3* 1.1 1.1   ALKPHOS 206* 228* 210*   * 160* 120*   * 275* 167*     CMV IgG positive - c/w past infection    CMV IgM negative    Ehrlichia by PCR pending  [place] spotted fever IgG and IgM pending    Culture Results:    No results for input(s): CXSURG in the last 720 hours. Blood Culture Recent:   Recent Labs     20  0908   BC No growth after 5 days of incubation.        [facility] and blood culture from June 23-, urine culture negative, COVID-19 test negative, influenza a and B screen negative        RADIOLOGY      Ct Head Wo Contrast    Result Date: 6/25/2020  EXAM: CT HEAD WO CONTRAST -- 6/25/2020 2:15 PM HISTORY: 79 years, Male, confusion COMPARISON: None DLP: 805 mGy cm. Automated exposure control was utilized to minimize patient radiation dose. TECHNIQUE: Unenhanced axial CT images obtained from vertex to skull base with coronal and sagittal reformats. FINDINGS: No acute intracranial hemorrhage, midline shift, mass effect or large territory cortical infarct. Confluent periventricular and subcortical white matter hypodensities, most likely due to chronic microvascular disease. Ventricles, sulci, basilar cisterns are proportionally prominent suggesting volume loss. Thinning of the ocular lenses may be postoperative or age-related. Paranasal sinuses and mastoid air cells normally aerated. External auditory canals within normal limits. Calvarium appears intact. Subcutaneous tissues within normal limits. 1. No acute intracranial hemorrhage, midline shift or mass effect. 2. Confluent periventricular and subcortical white matter hypodensities with volume loss, most commonly chronic microvascular changes. If clinical concern for acute infarct, MRI would be a more sensitive exam. Signed by Dr Ananya Andersen on 6/25/2020 4:22 PM    Xr Chest Portable    Result Date: 6/25/2020  EXAMINATION:  XR CHEST PORTABLE  6/25/2020 1:12 PM HISTORY: Shortness of breath. COMPARISON: No comparison study. FINDINGS:  There are patchy infiltrates in the perihilar regions and lung bases. There is minimal blunting of the costophrenic angles. Heart size is upper limits of normal. Thoracic aorta is atheromatous. 1. Bilateral infiltrates likely representing pulmonary edema. Pneumonia is possible. 2. There may be small pleural effusions.  Heart size is upper limits of normal. Signed by Dr Gumaro Latham contrast without intraluminal filling defects or convincing CT angiographic evidence of pulmonary embolus. The aorta is normal in caliber, without aneurysm or dissection. There is ectasia of the aortic arch with aortic calcifications. There is cardiomegaly with coronary artery calcifications. There are calcified left hilar lymph nodes. There is no mediastinal hilar lymphadenopathy. There are small bilateral pleural effusions with lower lobe airspace opacities, suspect atelectasis. There are emphysematous changes in the upper lobes with bleb formation. Evaluation of the lung fields challenging due to motion, however no definite consolidating parenchymal infiltrates identified. Thyroid gland enlargement with nodules. Left-sided nodule measures 2.3 cm. Right-sided nodule measures 1.6 cm. There is no axillary lymph node enlargement. Limited imaging the upper abdomen demonstrate no acute abnormality. There is pectus excavatum anterior chest wall deformity. No acute osseous abnormalities observed. Impression: 1. No CT angiographic evidence of pulmonary embolus or acute aortic pathology. 2. Small bilateral pleural effusions with associated airspace opacities likely atelectasis. 3. Cardiomegaly with atherosclerotic calcifications. 4. Thyroid enlargement with nodules. 5. Pectus excavatum anterior chest wall deformity. Signed by Dr Rena Toribio on 6/25/2020 4:26 PM          Patient Active Problem List   Diagnosis    Symptomatic bradycardia    Palliative care patient    NIDHI (acute kidney injury) ([facility] [address]    Altered mental status    Fever, unspecified       IMPRESSION:    1. Fever resolved. No pneumonia on CT angiogram, urine and blood cultures unremarkable at outside hospital as well as at [facility]. No other obvious source on exam.  Working diagnosis is tickborne, most likely Ehrlichia  2. Leukopenia- resolved  3.  thrombocytopenia- improving  4. Elevated transaminases-  improving  5.  Acute kidney injury with hematuria and proteinuria-continued improvement  6. Bilateral eye pain      RECOMMENDATIONS :  · Continue doxycycline- change to oral.  · Monitor liver enzymes closely. · Await Ehrlichia and [facility] spotted fever studies.-Ehrlichia should be reported tomorrow per lab  · Place nursing order to contact patient's wife to resume appropriate eyedrops.   Delmy Manzo MD

## 2020-07-01 NOTE — PROGRESS NOTES
Nephrology ([facility]) Progress Note    Patient:  Araseli Caro  YOB: 1940  Date of Service: 7/1/2020  MRN: [id number]   Acct: [de-identified]   Primary Care Physician: No primary care provider on file. Advance Directive: Full Code  Admit Date: 6/25/2020       Hospital Day: 6  Referring Provider: Charo Munoz DO    Patient independently seen and examined, Chart, Consults, Notes, Operative notes, Labs, Cardiology, and Radiology studies reviewed as able. Subjective: Joseph Delaney is a [de-identified] y.o. male  whom we were consulted for acute kidney injury. Patient denied any history of chronic kidney disease. Presented to the emergency room as he was brought in by family to the [facility] ER with fever up to 105. In the emergency room he was found to have bradycardia, increasing creatinine, thrombocytopenia and leukopenia. He was transferred to [facility] for further evaluation of fever. Apparently he was confused and had encephalopathy which was resolving slowly. He has a history of hypertension, sleep apnea and trigeminal neuralgia. Nephrology is consulted as he has serum creatinine of 1.7. His urinalysis showed patient has proteinuria and hematuria. He was initially admitted to ICU now transferred back to regular floor.     Today, no overnight events. Family present. Mental status improving. No overnight events noted. Denies chest pain, shortness of air, nausea vomiting.       Allergies:  Patient has no known allergies.     Medicines:  Current Facility-Administered Medications   Medication Dose Route Frequency Provider Last Rate Last Dose    potassium chloride (KLOR-CON M) extended release tablet 40 mEq  40 mEq Oral PRN Regan Fernando MD        Or    potassium bicarb-citric acid (EFFER-K) effervescent tablet 40 mEq  40 mEq Oral PRN Regan Fernando MD   40 mEq at 06/30/20 1002    Or    potassium chloride 10 mEq/100 mL IVPB (Peripheral Line)  10 mEq Intravenous PRN Jason Maradiaga MD        magnesium sulfate 1 g in dextrose 5% 100 mL IVPB  1 g Intravenous PRN Jason Maradiaga MD        acetaminophen (TYLENOL) tablet 650 mg  650 mg Oral Q4H PRN Munir Corley MD   650 mg at 07/01/20 0137    0.9 % sodium chloride infusion   Intravenous Continuous Cristina Marie MD 40 mL/hr at 06/30/20 1131 40 mL/hr at 06/30/20 1131    melatonin tablet 3 mg  3 mg Oral Nightly Jorge Wellington MD   3 mg at 06/30/20 2036    hydrOXYzine (ATARAX) tablet 12.5 mg  12.5 mg Oral Q6H PRN FEROZ Mixon   12.5 mg at 06/29/20 1739    lisinopril (PRINIVIL;ZESTRIL) tablet 20 mg  20 mg Oral Daily Kaitlynn Mann MD   20 mg at 07/01/20 0911    doxycycline (VIBRAMYCIN) 100 mg in dextrose 5 % 100 mL IVPB  100 mg Intravenous Q12H Munir Corley MD   Stopped at 07/01/20 0709    aspirin chewable tablet 81 mg  81 mg Oral Daily Janneth Bell MD   81 mg at 07/01/20 0911    polyethylene glycol (GLYCOLAX) packet 17 g  17 g Oral Daily PRN Janneth Bell MD        ondansetron (ZOFRAN-ODT) disintegrating tablet 4 mg  4 mg Oral Q8H PRN Janneth Bell MD        Or    ondansetron [facility]) injection 4 mg  4 mg Intravenous Q6H PRN Janneth Bell MD        nitroGLYCERIN (NITROSTAT) SL tablet 0.4 mg  0.4 mg Sublingual Q5 Min PRN Janneth Bell MD        lamoTRIgine (LAMICTAL) tablet 25 mg  25 mg Oral Daily Janneth Bell MD   25 mg at 07/01/20 0911    levothyroxine (SYNTHROID) tablet 50 mcg  50 mcg Oral Daily Janneth Bell MD   50 mcg at 07/01/20 9104    sodium chloride flush 0.9 % injection 10 mL  10 mL Intravenous 2 times per day Janneth Bell MD   10 mL at 07/01/20 0915    sodium chloride flush 0.9 % injection 10 mL  10 mL Intravenous PRN Janneth Bell MD        gabapentin (NEURONTIN) capsule 300 mg  300 mg Oral TID Janneth Bell MD   300 mg at 07/01/20 0911    ipratropium-albuterol (DUONEB) nebulizer solution 1 ampule  1 ampule Inhalation Q4H Maria De Jesus Ramachandran MD   1 ampule at 07/01/20 1032    hydrALAZINE (APRESOLINE) injection 10 mg  10 mg Intravenous Q4H PRN [facility], MD   10 mg at 06/27/20 0130    morphine injection 1 mg  1 mg Intravenous Q4H [facility]N [facility], [facility]   1 mg at 06/26/20 0211    morphine injection 2 mg  2 mg Intravenous Q4H PRN [facility], MD   2 mg at 06/27/20 0208    naloxone (NARCAN) injection 0.4 mg  0.4 mg Intravenous PRN [facility], MD           Past Medical History:  Past Medical History:   Diagnosis Date    Hypertension     Palliative care patient 06/26/2020    Sleep apnea     Thyroid disease     Trigeminal neuropathy        Past Surgical History:  Past Surgical History:   Procedure Laterality Date    CHOLECYSTECTOMY      HERNIA REPAIR         Family History  No family history on file.     Social History  Social History     Socioeconomic History    Marital status:      Spouse name: Roosevelt Bangura    Number of children: 2    Years of education: Not on file    Highest education level: Not on file   Occupational History    Not on file   Social Needs    Financial resource strain: Not on file    Food insecurity     Worry: Not on file     Inability: Not on file    Transportation needs     Medical: Not on file     Non-medical: Not on file   Tobacco Use    Smoking status: Former Smoker     Types: Cigarettes    Smokeless tobacco: Never Used   Substance and Sexual Activity    Alcohol use: Not on file    Drug use: Never    Sexual activity: Not Currently     Partners: Male   Lifestyle    Physical activity     Days per week: Not on file     Minutes per session: Not on file    Stress: Not on file   Relationships    Social connections     Talks on phone: Not on file     Gets together: Not on file     Attends Church service: Not on file     Active member of club or organization: Not on file     Attends meetings of clubs or organizations: Not on file     Relationship status: Not on file    Intimate partner violence     Fear of current or ex partner: Not on file     Emotionally abused: Not on file     Physically abused: Not on file     Forced sexual activity: Not on file   Other Topics Concern    Not on file   Social History Narrative    Not on file         Review of Systems:  History obtained from chart review and the patient  General ROS: + fever or chills  Respiratory ROS: No cough, shortness of breath, wheezing  Cardiovascular ROS: No chest pain or palpitations  Gastrointestinal ROS: No abdominal pain or melena  Genito-Urinary ROS: No dysuria or hematuria  Musculoskeletal ROS: No joint pain or swelling         Objective:  Patient Vitals for the past 24 hrs:   BP Temp Temp src Pulse Resp SpO2   07/01/20 0721 (!) 166/76 98.3 °F (36.8 °C) Temporal 58 16 92 %   07/01/20 0207 -- 98.8 °F (37.1 °C) -- -- -- --   07/01/20 0055 (!) 149/70 98.9 °F (37.2 °C) Temporal 63 17 95 %   06/30/20 1913 -- -- -- -- -- 97 %   06/30/20 1858 (!) 150/64 98.4 °F (36.9 °C) Temporal 57 18 94 %   06/30/20 1355 (!) 151/77 97.7 °F (36.5 °C) Temporal 57 16 93 %       Intake/Output Summary (Last 24 hours) at 7/1/2020 1155  Last data filed at 7/1/2020 0845  Gross per 24 hour   Intake 240 ml   Output 1425 ml   Net -1185 ml     General: awake/alert   Chest:  clear to auscultation bilaterally without respiratory distress  CVS: regular rate and rhythm  Abdominal: soft, nontender, normal bowel sounds  Extremities: no cyanosis or edema  Skin: warm/dry    Labs:  BMP:   Recent Labs     06/29/20  0334 06/30/20  0330 07/01/20  0132    144 141   K 3.6  3.6 3.2* 3.6    110 109   CO2 20* 21* 21*   PHOS  --  2.5  --    BUN 17 11 11   CREATININE 1.6* 1.3* 1.2   CALCIUM 9.0 8.8 8.8     CBC:   Recent Labs     06/29/20  0334 06/30/20  0330   WBC 6.1 9.4   HGB 13.1* 12.9*   HCT 39.6* 40.8*   MCV 95.2* 97.1*   PLT 79* 101*     LIVER PROFILE:   Recent Labs     06/29/20  0334 06/30/20  0330 07/01/20  0132   * 275* 167*   * 160* 120*   BILITOT 1.3* 1.1 1.1 ALKPHOS 206* 228* 210*     PT/INR: No results for input(s): PROTIME, INR in the last 72 hours. APTT: No results for input(s): APTT in the last 72 hours. BNP:  No results for input(s): BNP in the last 72 hours. Ionized Calcium:No results for input(s): IONCA in the last 72 hours. Magnesium:  Recent Labs     06/30/20  0330   MG 2.0     Phosphorus:  Recent Labs     06/30/20  0330   PHOS 2.5     HgbA1C: No results for input(s): LABA1C in the last 72 hours. Hepatic:   Recent Labs     06/29/20  0334 06/30/20  0330 07/01/20  0132   ALKPHOS 206* 228* 210*   * 160* 120*   * 275* 167*   PROT 5.2* 5.0* 5.1*   BILITOT 1.3* 1.1 1.1   LABALBU 3.2* 3.3* 3.1*     Lactic Acid: No results for input(s): LACTA in the last 72 hours. Troponin: No results for input(s): CKTOTAL, CKMB, TROPONINT in the last 72 hours. ABGs:   Lab Results   Component Value Date    PHART 7.460 06/25/2020    PO2ART 65.0 06/25/2020    VCT4YOZ 25.0 06/25/2020     CRP:  No results for input(s): CRP in the last 72 hours. Sed Rate:  No results for input(s): SEDRATE in the last 72 hours. Culture Results:   Blood Culture Recent:   Recent Labs     06/25/20  0908   BC No growth after 5 days of incubation. Urine Culture Recent :   Recent Labs     06/25/20  1150   LABURIN No growth at 36-48 hours       Radiology reports as per the Radiologist  Radiology: Ct Head Wo Contrast    Result Date: 6/25/2020  EXAM: CT HEAD WO CONTRAST -- 6/25/2020 2:15 PM HISTORY: 79 years, Male, confusion COMPARISON: None DLP: 805 mGy cm. Automated exposure control was utilized to minimize patient radiation dose. TECHNIQUE: Unenhanced axial CT images obtained from vertex to skull base with coronal and sagittal reformats. FINDINGS: No acute intracranial hemorrhage, midline shift, mass effect or large territory cortical infarct. Confluent periventricular and subcortical white matter hypodensities, most likely due to chronic microvascular disease.  Ventricles, sulci, Signed by Dr Marv Chavarria on 6/25/2020 1:23 PM    Vl Dup Lower Extremity Venous Bilateral    Result Date: 6/25/2020  Vascular Lower Extremities DVT Study Procedure  Demographics   Patient Name     Shahbaz Kim Age                    [de-identified]   Patient Number   [id number]       Gender                 Male   Visit Number     [id number]    Interpreting Physician Redd Grant MD   Date of Birth    1940   Referring Physician    Laverne Mullen   Accession Number [id number]   [id number], RVT  Procedure Type of Study:   Veins:Lower Extremities DVT Study, VL LOWER EXTREMITY BILATERAL VENOUS  DUPLEX . Technical Quality:Limited visualization due to combative patient. Impression   Unable to complete exam because patient was shaking uncontrollably. Only  right common femoral vein and saphenofemoral junction were able to be  imaged. No DVT/SVT seen in these areas. The remainder of the right leg  could not be imaged and therefore could not \"rule out\" deep or superficial  venous thrombosis. Signature   ----------------------------------------------------------------  Electronically signed by Redd Grant MD(Interpreting  physician) on 06/25/2020 05:28 PM  ----------------------------------------------------------------  Velocities are measured in cm/s ; Diameters are measured in mm Right Lower Extremities DVT Study Measurements Right 2D Measurements +------------------------------------+----------+---------------+----------+ ! Location                            ! Visualized! Compressibility! Thrombosis! +------------------------------------+----------+---------------+----------+ ! Sapheno Femoral Junction            ! Yes       ! Yes            ! None      ! +------------------------------------+----------+---------------+----------+ ! Common Femoral                      !Yes       ! Yes            ! None      ! +------------------------------------+----------+---------------+----------+    Cta Pulmonary W Contrast    Result Date: 6/25/2020  CTA PULMONARY W CONTRAST 6/25/2020 11:30 AM HISTORY: Elevated d-dimer. Shortness of breath. Clinical finding is worrisome for pulmonary embolus Comparison: None Technique: CT evaluation of the chest was performed with intravenous contrast. 2 mm transaxial images were obtained. Maximum intensity projection reconstruction angiographic images were generated . Two-dimensional Coronal and sagittal reconstruction images of the pulmonary arteries, aorta and great vessels were also generated. .Radiation dose equals  mGy cm. AUTOMATED EXPOSURE CONTROL dose reduction technique was implemented Findings: There is image quadrant degradation related to breathing motion artifact. Pulmonary arteries opacify with iodinated contrast without intraluminal filling defects or convincing CT angiographic evidence of pulmonary embolus. The aorta is normal in caliber, without aneurysm or dissection. There is ectasia of the aortic arch with aortic calcifications. There is cardiomegaly with coronary artery calcifications. There are calcified left hilar lymph nodes. There is no mediastinal hilar lymphadenopathy. There are small bilateral pleural effusions with lower lobe airspace opacities, suspect atelectasis. There are emphysematous changes in the upper lobes with bleb formation. Evaluation of the lung fields challenging due to motion, however no definite consolidating parenchymal infiltrates identified. Thyroid gland enlargement with nodules. Left-sided nodule measures 2.3 cm. Right-sided nodule measures 1.6 cm. There is no axillary lymph node enlargement. Limited imaging the upper abdomen demonstrate no acute abnormality. There is pectus excavatum anterior chest wall deformity. No acute osseous abnormalities observed. Impression: 1. No CT angiographic evidence of pulmonary embolus or acute aortic pathology.  2. Small bilateral pleural effusions with associated airspace opacities likely

## 2020-07-01 NOTE — PROGRESS NOTES
Palliative Care Progress Note  7/1/2020 11:37 AM  Subjective:   Admit Date: 6/25/2020  PCP: No primary care provider on file. Chief Complaint: Facial Pain    Interval History: No acute overnight events. Mental status much improved, he continues on IV abx, working with therapy today. Does have increased complaints of facial pain from trigeminal neuralgia. Portions of this note have been copied forward, however, changed to reflect the most current clinical status of this patient. Review of Systems   Constitutional: Positive for activity change. Gen weakness   HENT:        Facial pain    Eyes: Negative. Respiratory: Negative. Cardiovascular: Negative. Gastrointestinal: Negative. Endocrine: Negative. Genitourinary: Negative. Musculoskeletal: Negative. Skin: Negative. Neurological: Negative. Psychiatric/Behavioral: Negative.            DIET CARDIAC;    Medications:   sodium chloride 40 mL/hr (06/30/20 1131)     Current Facility-Administered Medications   Medication Dose Route Frequency Provider Last Rate Last Dose    potassium chloride (KLOR-CON M) extended release tablet 40 mEq  40 mEq Oral PRN Donovan Hurt MD        Or    potassium bicarb-citric acid (EFFER-K) effervescent tablet 40 mEq  40 mEq Oral PRN Donovan Hurt MD   40 mEq at 06/30/20 1002    Or    potassium chloride 10 mEq/100 mL IVPB (Peripheral Line)  10 mEq Intravenous PRN Donovan Hurt MD        magnesium sulfate 1 g in dextrose 5% 100 mL IVPB  1 g Intravenous PRN Donovan Hurt MD        acetaminophen (TYLENOL) tablet 650 mg  650 mg Oral Q4H PRN Sam Goddard MD   650 mg at 07/01/20 0137    0.9 % sodium chloride infusion   Intravenous Continuous Nellie John MD 40 mL/hr at 06/30/20 1131 40 mL/hr at 06/30/20 1131    melatonin tablet 3 mg  3 mg Oral Nightly Flaco Ackerman MD   3 mg at 06/30/20 2036    hydrOXYzine (ATARAX) tablet 12.5 mg  12.5 mg Oral Q6H PRN FEROZ Rose 12.5 mg at 06/29/20 1739    lisinopril (PRINIVIL;ZESTRIL) tablet 20 mg  20 mg Oral Daily Erum Barroso MD   20 mg at 07/01/20 0911    doxycycline (VIBRAMYCIN) 100 mg in dextrose 5 % 100 mL IVPB  100 mg Intravenous Q12H Ana Edge MD   Stopped at 07/01/20 0709    aspirin chewable tablet 81 mg  81 mg Oral Daily Carolyn Calderon MD   81 mg at 07/01/20 0911    polyethylene glycol (GLYCOLAX) packet 17 g  17 g Oral Daily PRN Carolyn Calderon MD        ondansetron (ZOFRAN-ODT) disintegrating tablet 4 mg  4 mg Oral Q8H PRN Carolyn Calderon MD        Or    ondansetron [facility]) injection 4 mg  4 mg Intravenous Q6H PRN Carolyn Calderon MD        nitroGLYCERIN (NITROSTAT) SL tablet 0.4 mg  0.4 mg Sublingual Q5 Min PRN Carolyn Calderon MD        lamoTRIgine (LAMICTAL) tablet 25 mg  25 mg Oral Daily Carolyn Calderon MD   25 mg at 07/01/20 0911    levothyroxine (SYNTHROID) tablet 50 mcg  50 mcg Oral Daily Carolyn Calderon MD   50 mcg at 07/01/20 3541    sodium chloride flush 0.9 % injection 10 mL  10 mL Intravenous 2 times per day Carolyn Calderon MD   10 mL at 07/01/20 0915    sodium chloride flush 0.9 % injection 10 mL  10 mL Intravenous PRN Carolyn Calderon MD        gabapentin (NEURONTIN) capsule 300 mg  300 mg Oral TID Carolyn Calderon MD   300 mg at 07/01/20 0911    ipratropium-albuterol (DUONEB) nebulizer solution 1 ampule  1 ampule Inhalation Q4H [place] Carolyn Calderon MD   1 ampule at 07/01/20 1032    hydrALAZINE (APRESOLINE) injection 10 mg  10 mg Intravenous Q4H PRN Carolyn Calderon MD   10 mg at 06/27/20 0130    morphine injection 1 mg  1 mg Intravenous Q4H PRN Carolyn Calderon MD   1 mg at 06/26/20 0211    morphine injection 2 mg  2 mg Intravenous Q4H PRN Carolyn Calderon MD   2 mg at 06/27/20 0208    naloxone [facility]) injection 0.4 mg  0.4 mg Intravenous PRN Carolyn Calderon MD            Labs:     Recent Labs     06/29/20  0334 06/30/20  0330   WBC 6.1 9.4   RBC 4.16* 4.20*   HGB 13.1* 12.9*   HCT 39.6* 40.8*   MCV 95.2* 97.1*   MCH 31.5* 30.7   MCHC 33.1 31.6*   PLT 79* 101*     Recent Labs     06/29/20  0334 06/30/20  0330 07/01/20  0132    144 141   K 3.6  3.6 3.2* 3.6   ANIONGAP 13 13 11    110 109   CO2 20* 21* 21*   BUN 17 11 11   CREATININE 1.6* 1.3* 1.2   GLUCOSE 121* 97 100   CALCIUM 9.0 8.8 8.8     Recent Labs     06/30/20  0330   MG 2.0   PHOS 2.5     Recent Labs     06/29/20  0334 06/30/20  0330 07/01/20  0132   * 275* 167*   * 160* 120*   BILITOT 1.3* 1.1 1.1   ALKPHOS 206* 228* 210*     ABGs:No results for input(s): PHART, EMV9MVI, PO2ART, OGU6KBK, BEART, HGBAE, A2MWKXQG, CARBOXHGBART, 02THERAPY in the last 72 hours. HgBA1c: No results for input(s): LABA1C in the last 72 hours. FLP:    Lab Results   Component Value Date    TRIG 81 06/26/2020    HDL 38 06/26/2020    LDLCALC 50 06/26/2020     TSH:    Lab Results   Component Value Date    TSH 3.510 06/28/2020     Troponin T: No results for input(s): TROPONINI in the last 72 hours. INR: No results for input(s): INR in the last 72 hours.     Objective:   Vitals: BP (!) 166/76   Pulse 58   Temp 98.3 °F (36.8 °C) (Temporal)   Resp 16   Ht 6' (1.829 m)   Wt 172 lb 1.6 oz (78.1 kg)   SpO2 92%   BMI 23.34 kg/m²   24HR INTAKE/OUTPUT:      Intake/Output Summary (Last 24 hours) at 7/1/2020 1137  Last data filed at 7/1/2020 0845  Gross per 24 hour   Intake 240 ml   Output 1425 ml   Net -1185 ml     Physical Exam  General appearance: alert and cooperative with exam, no acute distress  HEENT: atraumatic, eyes with clear conjunctiva and normal lids, pupils and irises normal, external ears and nose are normal,lips normal.  Neck: without masses, supple   Lungs: no increased work of breathing, clear to auscultation bilaterally  Heart: regular rate and rhythm and S1, S2 normal  Abdomen: soft, non-tender; bowel sounds normal; no masses,  no organomegaly  Genitourinary: No bladder fullness, masses, or tenderness  Extremities: extremities normal, atraumatic, no cyanosis or edema  Neurologic: No focal neurologic deficits, normal sensation,  alert and oriented x 3  Psychiatric: alert and oriented, mood and affect appropriate  Skin: warm, dry        Assessment and Plan: Active Problems:    Symptomatic bradycardia    Palliative care patient    NIDHI (acute kidney injury) ([facility] [address]    Altered mental status    Fever, unspecified  Resolved Problems:    * No resolved hospital problems. *      Visit Summary: I saw the patient at the bedside with his spouse present. He is sitting up in the recliner, alert, oriented, and answering questions appropriately. Much improved and mental status is baseline. I discussed goals of care and goal remains to return home with [facility] and the assistance of family. Patient is having increased jaw pain from his trigeminal neuralgia, home dose of gabapentin is 800mg BID, consider increasing to home dose now that mental status has improved. I have encouraged him to continue with therapy and increase his nutritional intake. No other issues at this time. Palliative will continue to follow. Recommendations:   1. Current goal is to return home with [facility] services, continue to encourage participation in therapy and mobilize  2. Patient is having increased jaw pain from his trigeminal neuralgia, home dose of gabapentin is 800mg BID, consider increasing to home dose now that mental status has improved  3. Encourage and assist with nutritional intake    Thank you for consulting Palliative Care and allowing us to participate in the care of this patient.        Electronically signed by FEROZ Echeverria on 7/1/2020 at 11:37 AM    (Please note that portions of this note were completed with a voice recognition program.  Radha Rossi made to edit the dictations but occasionally words are mis-transcribed.)

## 2020-07-02 LAB
ALBUMIN SERPL-MCNC: 3.08 G/DL (ref 3.75–5.01)
ALPHA-1-GLOBULIN: 0.39 G/DL (ref 0.19–0.46)
ALPHA-2-GLOBULIN: 0.43 G/DL (ref 0.48–1.05)
ANAPLASMA PHAGNOCYTOPHILUM BY PCR: NOT DETECTED
ANION GAP SERPL CALCULATED.3IONS-SCNC: 10 MMOL/L (ref 7–19)
BETA GLOBULIN: 0.62 G/DL (ref 0.48–1.1)
BUN BLDV-MCNC: 12 MG/DL (ref 8–23)
CALCIUM SERPL-MCNC: 9.8 MG/DL (ref 8.8–10.2)
CHLORIDE BLD-SCNC: 108 MMOL/L (ref 98–111)
CO2: 24 MMOL/L (ref 22–29)
CREAT SERPL-MCNC: 1.3 MG/DL (ref 0.5–1.2)
EHRLICHIA CHAFFEENSIS BY PCR: DETECTED
EHRLICHIA EWINGIL/CANIS BY PCR: NOT DETECTED
EHRLICHIA MURIS-LIKE BY PCR: NOT DETECTED
GAMMA GLOBULIN: 0.58 G/DL (ref 0.62–1.51)
GFR NON-AFRICAN AMERICAN: 53
GLUCOSE BLD-MCNC: 106 MG/DL (ref 74–109)
POTASSIUM REFLEX MAGNESIUM: 3.9 MMOL/L (ref 3.5–5)
PROTEIN ELECTROPHORESIS, SERUM: ABNORMAL
ROCKY MOUNTAIN SPOTTED FEVER AB IGM,: ABNORMAL
ROCKY MOUNTAIN SPOTTED FEVER ANTIBODY IGG: ABNORMAL
SODIUM BLD-SCNC: 142 MMOL/L (ref 136–145)
SPE/IFE INTERPRETATION: ABNORMAL
TOTAL PROTEIN: 5.1 G/DL (ref 6.3–8.2)

## 2020-07-02 PROCEDURE — 6370000000 HC RX 637 (ALT 250 FOR IP): Performed by: INTERNAL MEDICINE

## 2020-07-02 PROCEDURE — 6370000000 HC RX 637 (ALT 250 FOR IP): Performed by: HOSPITALIST

## 2020-07-02 PROCEDURE — 2140000000 HC CCU INTERMEDIATE R&B

## 2020-07-02 PROCEDURE — 97535 SELF CARE MNGMENT TRAINING: CPT

## 2020-07-02 PROCEDURE — 94640 AIRWAY INHALATION TREATMENT: CPT

## 2020-07-02 PROCEDURE — 97530 THERAPEUTIC ACTIVITIES: CPT

## 2020-07-02 PROCEDURE — 2580000003 HC RX 258: Performed by: HOSPITALIST

## 2020-07-02 PROCEDURE — 97110 THERAPEUTIC EXERCISES: CPT

## 2020-07-02 PROCEDURE — 97116 GAIT TRAINING THERAPY: CPT

## 2020-07-02 PROCEDURE — 36415 COLL VENOUS BLD VENIPUNCTURE: CPT

## 2020-07-02 PROCEDURE — 80048 BASIC METABOLIC PNL TOTAL CA: CPT

## 2020-07-02 RX ORDER — AMLODIPINE BESYLATE 5 MG/1
10 TABLET ORAL DAILY
Status: DISCONTINUED | OUTPATIENT
Start: 2020-07-03 | End: 2020-07-03 | Stop reason: HOSPADM

## 2020-07-02 RX ORDER — GABAPENTIN 400 MG/1
400 CAPSULE ORAL 4 TIMES DAILY
Status: DISCONTINUED | OUTPATIENT
Start: 2020-07-02 | End: 2020-07-03 | Stop reason: HOSPADM

## 2020-07-02 RX ADMIN — LISINOPRIL 20 MG: 20 TABLET ORAL at 11:16

## 2020-07-02 RX ADMIN — ASPIRIN 81 MG 81 MG: 81 TABLET ORAL at 11:16

## 2020-07-02 RX ADMIN — LEVOTHYROXINE SODIUM 50 MCG: 50 TABLET ORAL at 05:47

## 2020-07-02 RX ADMIN — GABAPENTIN 400 MG: 400 CAPSULE ORAL at 18:11

## 2020-07-02 RX ADMIN — IPRATROPIUM BROMIDE AND ALBUTEROL SULFATE 1 AMPULE: .5; 3 SOLUTION RESPIRATORY (INHALATION) at 06:50

## 2020-07-02 RX ADMIN — LAMOTRIGINE 25 MG: 25 TABLET ORAL at 11:15

## 2020-07-02 RX ADMIN — IPRATROPIUM BROMIDE AND ALBUTEROL SULFATE 1 AMPULE: .5; 3 SOLUTION RESPIRATORY (INHALATION) at 14:54

## 2020-07-02 RX ADMIN — SODIUM CHLORIDE, PRESERVATIVE FREE 10 ML: 5 INJECTION INTRAVENOUS at 11:16

## 2020-07-02 RX ADMIN — SODIUM CHLORIDE, PRESERVATIVE FREE 10 ML: 5 INJECTION INTRAVENOUS at 23:00

## 2020-07-02 RX ADMIN — GABAPENTIN 300 MG: 300 CAPSULE ORAL at 11:19

## 2020-07-02 RX ADMIN — MELATONIN 3 MG: at 23:00

## 2020-07-02 RX ADMIN — DOXYCYCLINE 100 MG: 50 CAPSULE ORAL at 11:15

## 2020-07-02 RX ADMIN — IPRATROPIUM BROMIDE AND ALBUTEROL SULFATE 1 AMPULE: .5; 3 SOLUTION RESPIRATORY (INHALATION) at 10:55

## 2020-07-02 RX ADMIN — AMLODIPINE BESYLATE 5 MG: 5 TABLET ORAL at 11:15

## 2020-07-02 RX ADMIN — GABAPENTIN 400 MG: 400 CAPSULE ORAL at 22:59

## 2020-07-02 RX ADMIN — DOXYCYCLINE 100 MG: 50 CAPSULE ORAL at 22:59

## 2020-07-02 ASSESSMENT — PAIN SCALES - GENERAL: PAINLEVEL_OUTOF10: 0

## 2020-07-02 ASSESSMENT — ENCOUNTER SYMPTOMS: EYE PAIN: 0

## 2020-07-02 NOTE — PROGRESS NOTES
07/02/20 0954   Restrictions/Precautions   Restrictions/Precautions Fall Risk   General   Chart Reviewed Yes   Subjective   Subjective Patient in bed agrees to therapy   Pain Screening   Patient Currently in Pain Denies   Vital Signs   Level of Consciousness 0   Oxygen Therapy   O2 Device None (Room air)   Patient Observation   Observations Patient calmer more safety awarness answers all questions appropriately   Orientation   Overall Orientation Status WNL   Bed Mobility   Supine to Sit Supervision   Transfers   Sit to Stand Stand by assistance   Stand to sit Contact guard assistance   Bed to Chair Contact guard assistance   Ambulation   Ambulation?  Yes   Ambulation 1   Surface level tile   Device Hand-Held Assist  (Assist x 2)   Assistance Contact guard assistance;2 Person assistance  (Hand Held x 2)   Quality of Gait Steady No LOB safe pace   Gait Deviations Slow Yazmin   Distance 200'   Comments Patient much improved with gait   Balance   Posture Fair   Sitting - Static Good   Sitting - Dynamic Good   Standing - Static Fair   Standing - Dynamic Fair   Exercises   Heelslides 20   Hip Flexion 20   Hip Abduction 20   Knee Long Arc Quad 20   Knee Active Range of Motion yes   Ankle Pumps 20   Comments BL LE EX in sitting AROM   Activity Tolerance   Activity Tolerance Patient Tolerated treatment well   Safety Devices   Type of devices Left in chair;Call light within reach  (Daughter present)   Physical Therapy    Electronically signed by Vidya Villanueva PTA on 7/2/2020 at 10:03 AM

## 2020-07-02 NOTE — PROGRESS NOTES
Occupational Therapy  Facility/Department: [facility] 7 PROGRESSIVE CARE  Daily Treatment Note  NAME: Sherry Bai  : 1940  MRN: [id number]    Date of Service: 2020    Discharge Recommendations:          Assessment   Assessment: Pt. much clearer and doing better than 2 days ago. Now seems like possibility to return home with spouse. Will need to use r.w. or cane short term            Patient Diagnosis(es): There were no encounter diagnoses. has a past medical history of Hypertension, Palliative care patient, Sleep apnea, Thyroid disease, and Trigeminal neuropathy. has a past surgical history that includes Cholecystectomy and hernia repair.     Restrictions  Restrictions/Precautions  Restrictions/Precautions: (P) Fall Risk  Subjective   General  Chart Reviewed: Yes  Patient assessed for rehabilitation services?: Yes  Family / Caregiver Present: Yes(Daughter present)  Diagnosis: Symptomatic bradycardia      Orientation     Objective    ADL  LE Dressing: Contact guard assistance(Supervision with slip on shoes and underwear over knees, CGA to pull underwear up over hips.)              Transfers  Stand Step Transfers: Contact guard assistance  Sit to stand: Supervision  Transfer Comments: HHA to walk over 200 ft with no lob                                                                 Plan   Plan  Times per week: 3-5x/week  Times per day: Daily  G-Code     OutComes Score                                                  AM-PAC Score             Goals  Short term goals  Time Frame for Short term goals: 1 week  Short term goal 1: CGA consistently with toilet tfers  Short term goal 2: Tolerate standing 60 seconds with CGA and no lob to assist with ADLs  Short term goal 3: Pt. feed self 5 bites of food with supervision and no spills  Long term goals  Long term goal 1: Return to PLOF       Therapy Time   Individual Concurrent Group Co-treatment   Time In           Time Out           Minutes                   Ethel Hardin [place]

## 2020-07-02 NOTE — PROGRESS NOTES
INFECTIOUS DISEASES PROGRESS NOTE    Patient:  Lefty Conner  YOB: 1940  MRN: [id number]   Admit date: 6/25/2020   Admitting Physician: Nikolay Sharif DO  Primary Care Physician: No primary care provider on file. CHIEF COMPLAINT: \"I am okay\"    Interval History:   Patient reports he thinks his eyes are little better. His daughter is at bedside. She reports he has been sitting up somewhat and just charted to lay down. We discussed his antibiotic and she said he is having some trouble swallowing because of his trigeminal neuralgia worsening. They asked that I increase his Neurontin back to his home dose (looked in the computer and appears this was done today)    Made sure that he took his doxycycline with a full glass of water as I put this in the comment yesterday. She reports he only took his meds with some applesauce so she was concerned about that. She reports she has not seen the hospitalist but did go down to [place].   Asked the nurse if he had rounded and he said \"I have not seen them\"      Allergies: No Known Allergies    Current Meds: [START ON 7/3/2020] amLODIPine (NORVASC) tablet 10 mg, Daily  gabapentin (NEURONTIN) capsule 400 mg, 4x Daily  doxycycline monohydrate (MONODOX) capsule 100 mg, 2 times per day  potassium chloride (KLOR-CON M) extended release tablet 40 mEq, PRN    Or  potassium bicarb-citric acid (EFFER-K) effervescent tablet 40 mEq, PRN    Or  potassium chloride 10 mEq/100 mL IVPB (Peripheral Line), PRN  magnesium sulfate 1 g in dextrose 5% 100 mL IVPB, PRN  acetaminophen (TYLENOL) tablet 650 mg, Q4H PRN  melatonin tablet 3 mg, Nightly  hydrOXYzine (ATARAX) tablet 12.5 mg, Q6H PRN  lisinopril (PRINIVIL;ZESTRIL) tablet 20 mg, Daily  aspirin chewable tablet 81 mg, Daily  polyethylene glycol (GLYCOLAX) packet 17 g, Daily PRN  ondansetron (ZOFRAN-ODT) disintegrating tablet 4 mg, Q8H PRN    Or  ondansetron (ZOFRAN) injection 4 mg, Q6H PRN  nitroGLYCERIN (NITROSTAT) SL tablet 0.4 mg, Q5 Min PRN  lamoTRIgine (LAMICTAL) tablet 25 mg, Daily  levothyroxine (SYNTHROID) tablet 50 mcg, Daily  sodium chloride flush 0.9 % injection 10 mL, 2 times per day  sodium chloride flush 0.9 % injection 10 mL, PRN  ipratropium-albuterol (DUONEB) nebulizer solution 1 ampule, Q4H WA  hydrALAZINE (APRESOLINE) injection 10 mg, Q4H PRN  morphine injection 1 mg, Q4H PRN  morphine injection 2 mg, Q4H PRN  naloxone (NARCAN) injection 0.4 mg, PRN        Review of Systems   Constitutional: Negative for fever. Eyes: Negative for pain. Neurological:        Right-sided face pain secondary to trigeminal neuralgia       Vital Signs:  BP (!) 172/73   Pulse 62   Temp 99.1 °F (37.3 °C) (Temporal)   Resp 18   Ht 6' (1.829 m)   Wt 172 lb 1.6 oz (78.1 kg)   SpO2 95%   BMI 23.34 kg/m²   Temp (24hrs), Av.4 °F (36.9 °C), Min:97.5 °F (36.4 °C), Max:99.1 °F (37.3 °C)      Physical Exam  General: The patient is an elderly male, lying flat in bed in no acute distress  HEENT: Sclera anicteric and are less injected today. Neck: Without meningismus  Respiratory: Effort even and unlabored  Abdomen: Soft, bowel sounds are positive, nontender  Neuro: He was following simple commands without difficulty.   Speech was clear  Psych: He was pleasant and cooperative        LAB RESULTS:    CBC with DIFF:  Recent Labs     20  0330   WBC 9.4   RBC 4.20*   HGB 12.9*   HCT 40.8*   MCV 97.1*   MCH 30.7   MCHC 31.6*   RDW 14.1   *   MPV 11.7   NEUTOPHILPCT 39.0*   LYMPHOPCT 60.0*   MONOPCT 0.0   EOSRELPCT 1.0   BASOPCT 0.0   NEUTROABS 3.7   LYMPHSABS 5.6*   MONOSABS 0.00   EOSABS 0.09   BASOSABS 0.00       CMP/BMP:  Recent Labs     20  0330 20  0132 20  1013    141 142   K 3.2* 3.6 3.9    109 108   CO2 21* 21* 24   ANIONGAP 13 11 10   GLUCOSE 97 100 106   BUN 11 11 12   CREATININE 1.3* 1.2 1.3*   LABGLOM 53* 58* 53*   CALCIUM 8.8 8.8 9.8   PROT 5.0* 5.1*  --    LABALBU 3.3* 3.1*  -- BILITOT 1.1 1.1  --    ALKPHOS 228* 210*  --    * 120*  --    * 167*  --      CMV IgG positive - c/w past infection    CMV IgM negative      Ehrlichia Chaffeensis by PCR Abnormal  06/27/2020  7:48 AM [facility]   Detected          [place] spotted fever IgG and IgM pending    Culture Results:    No results for input(s): CXSURG in the last 720 hours. Blood Culture Recent:   Recent Labs     06/25/20  0908   BC No growth after 5 days of incubation. [address] and blood culture from June 23-, urine culture negative, COVID-19 test negative, influenza a and B screen negative        RADIOLOGY      Ct Head Wo Contrast    Result Date: 6/25/2020  EXAM: CT HEAD WO CONTRAST -- 6/25/2020 2:15 PM HISTORY: 79 years, Male, confusion COMPARISON: None DLP: 805 mGy cm. Automated exposure control was utilized to minimize patient radiation dose. TECHNIQUE: Unenhanced axial CT images obtained from vertex to skull base with coronal and sagittal reformats. FINDINGS: No acute intracranial hemorrhage, midline shift, mass effect or large territory cortical infarct. Confluent periventricular and subcortical white matter hypodensities, most likely due to chronic microvascular disease. Ventricles, sulci, basilar cisterns are proportionally prominent suggesting volume loss. Thinning of the ocular lenses may be postoperative or age-related. Paranasal sinuses and mastoid air cells normally aerated. External auditory canals within normal limits. Calvarium appears intact. Subcutaneous tissues within normal limits. 1. No acute intracranial hemorrhage, midline shift or mass effect. 2. Confluent periventricular and subcortical white matter hypodensities with volume loss, most commonly chronic microvascular changes.  If clinical concern for acute infarct, MRI would be a more sensitive exam. Signed by Dr Yefri Weldon on 6/25/2020 4:22 PM    Xr Chest Portable    Result Date: 6/25/2020  EXAMINATION: XR CHEST PORTABLE  6/25/2020 1:12 PM HISTORY: Shortness of breath. COMPARISON: No comparison study. FINDINGS:  There are patchy infiltrates in the perihilar regions and lung bases. There is minimal blunting of the costophrenic angles. Heart size is upper limits of normal. Thoracic aorta is atheromatous. 1. Bilateral infiltrates likely representing pulmonary edema. Pneumonia is possible. 2. There may be small pleural effusions. Heart size is upper limits of normal. Signed by Dr Birdie Maria on 6/25/2020 1:23 PM    Vl Dup Lower Extremity Venous Bilateral    Result Date: 6/25/2020  Vascular Lower Extremities DVT Study Procedure  Demographics   Patient Name     Jono Oden Age                    [de-identified]   Patient Number   [id number]       Gender                 Male   Visit Number     [id number]    Interpreting Physician Sy Wiggins MD   Date of Birth    1940   Referring Physician    Marek Kwong   Accession Number [id number]   [address], RVT  Procedure Type of Study:   Veins:Lower Extremities DVT Study, VL LOWER EXTREMITY BILATERAL VENOUS  DUPLEX . Technical Quality:Limited visualization due to combative patient. Impression   Unable to complete exam because patient was shaking uncontrollably. Only  right common femoral vein and saphenofemoral junction were able to be  imaged. No DVT/SVT seen in these areas. The remainder of the right leg  could not be imaged and therefore could not \"rule out\" deep or superficial  venous thrombosis. Signature   ----------------------------------------------------------------  Electronically signed by Sy Wiggins MD(Interpreting  physician) on 06/25/2020 05:28 PM        Cta Pulmonary W Contrast    Result Date: 6/25/2020  CTA PULMONARY W CONTRAST 6/25/2020 11:30 AM HISTORY: Elevated d-dimer. Shortness of breath.  Clinical finding is worrisome for pulmonary embolus Comparison: None Technique: CT evaluation of the chest was performed with intravenous bradycardia    Palliative care patient    NIDHI (acute kidney injury) ([facility] [address]    Altered mental status    Fever, unspecified       IMPRESSION:    1. Fever resolved. Secondary to Ehrlichia   2. leukopenia- resolved  3.  thrombocytopenia- improving  4. Elevated transaminases- had been improving. Will make sure liver enzymes checked tomorrow  5. Acute kidney injury with hematuria and proteinuria- improved  6. Trigeminal neuralgia      RECOMMENDATIONS :  · Continue doxycycline- oral.  · Increase gabapentin-done  · Monitor liver enzymes closely. We will add liver test for tomorrow  · Okay for discharge home on oral doxycycline when otherwise ready for discharge    .     Sharon Harris MD

## 2020-07-02 NOTE — PROGRESS NOTES
Nephrology ([facility]) Progress Note    Patient:  Terril Cranker  YOB: 1940  Date of Service: 7/2/2020  MRN: [id number]   Acct: [de-identified]   Primary Care Physician: No primary care provider on file. Advance Directive: Full Code  Admit Date: 6/25/2020       Hospital Day: 7  Referring Provider: Salvatore Greenwood DO    Patient independently seen and examined, Chart, Consults, Notes, Operative notes, Labs, Cardiology, and Radiology studies reviewed as able. Subjective: Joseph Austin is a [de-identified] y.o. male  whom we were consulted for acute kidney injury. Patient denied any history of chronic kidney disease. Presented to the emergency room as he was brought in by family to the [facility] ER with fever up to 105. In the emergency room he was found to have bradycardia, increasing creatinine, thrombocytopenia and leukopenia. He was transferred to [facility] for further evaluation of fever. Apparently he was confused and had encephalopathy which was resolving slowly. He has a history of hypertension, sleep apnea and trigeminal neuralgia. Nephrology is consulted as he has serum creatinine of 1.7. His urinalysis showed patient has proteinuria and hematuria. He was initially admitted to ICU now transferred back to regular floor.     Today, no overnight events. Family present. Mental status improving. No overnight events noted. Denies chest pain, shortness of air, nausea vomiting. Up walking walking around the room     Allergies:  Patient has no known allergies.     Medicines:  Current Facility-Administered Medications   Medication Dose Route Frequency Provider Last Rate Last Dose    amLODIPine (NORVASC) tablet 5 mg  5 mg Oral Daily Salvatore Greenwood DO   5 mg at 07/02/20 1115    doxycycline monohydrate (MONODOX) capsule 100 mg  100 mg Oral 2 times per day Brandon Baires MD   100 mg at 07/02/20 1115    potassium chloride (KLOR-CON M) extended release tablet 40 mEq  40 mEq Oral PRN Gilberto Platt MD        Or    potassium bicarb-citric acid (EFFER-K) effervescent tablet 40 mEq  40 mEq Oral PRN Gilberto Platt MD   40 mEq at 06/30/20 1002    Or    potassium chloride 10 mEq/100 mL IVPB (Peripheral Line)  10 mEq Intravenous PRN Gilberto Platt MD        magnesium sulfate 1 g in dextrose 5% 100 mL IVPB  1 g Intravenous PRN Gilberto Platt MD        acetaminophen (TYLENOL) tablet 650 mg  650 mg Oral Q4H PRN Israel Arredondo MD   650 mg at 07/01/20 2340    melatonin tablet 3 mg  3 mg Oral Nightly Maria D Martinez MD   3 mg at 07/01/20 1932    hydrOXYzine (ATARAX) tablet 12.5 mg  12.5 mg Oral Q6H PRN FEROZ Alvarado   12.5 mg at 07/01/20 2340    lisinopril (PRINIVIL;ZESTRIL) tablet 20 mg  20 mg Oral Daily Parisa Graham MD   20 mg at 07/02/20 1116    aspirin chewable tablet 81 mg  81 mg Oral Daily Hoang Connors MD   81 mg at 07/02/20 1116    polyethylene glycol (GLYCOLAX) packet 17 g  17 g Oral Daily PRN Hoang Connors MD        ondansetron (ZOFRAN-ODT) disintegrating tablet 4 mg  4 mg Oral Q8H PRN Hoang Connors MD        Or    ondansetron (ZOFRAN) injection 4 mg  4 mg Intravenous Q6H PRN Hoang Connors MD        nitroGLYCERIN (NITROSTAT) SL tablet 0.4 mg  0.4 mg Sublingual Q5 Min PRN Hoang Connors MD        lamoTRIgine (LAMICTAL) tablet 25 mg  25 mg Oral Daily Hoang Connors MD   25 mg at 07/02/20 1115    levothyroxine (SYNTHROID) tablet 50 mcg  50 mcg Oral Daily Hoang Connors MD   50 mcg at 07/02/20 0547    sodium chloride flush 0.9 % injection 10 mL  10 mL Intravenous 2 times per day Hoang Connors MD   10 mL at 07/02/20 1116    sodium chloride flush 0.9 % injection 10 mL  10 mL Intravenous PRN Hoang Connors MD        gabapentin (NEURONTIN) capsule 300 mg  300 mg Oral TID Hoang Connors MD   300 mg at 07/02/20 1119    ipratropium-albuterol (DUONEB) nebulizer solution 1 ampule  1 ampule Inhalation Q4H GABBI Connors MD   1 ampule at 07/02/20 1055    hydrALAZINE (APRESOLINE) injection 10 mg  10 mg Intravenous Q4H PRN Fang Caba MD   10 mg at 06/27/20 0130    morphine injection 1 mg  1 mg Intravenous Q4H PRN Fang Caba MD   1 mg at 06/26/20 0211    morphine injection 2 mg  2 mg Intravenous Q4H PRN Fang Caba MD   2 mg at 06/27/20 0208    naloxone (NARCAN) injection 0.4 mg  0.4 mg Intravenous PRN Fang Caba MD           Past Medical History:  Past Medical History:   Diagnosis Date    Hypertension     Palliative care patient 06/26/2020    Sleep apnea     Thyroid disease     Trigeminal neuropathy        Past Surgical History:  Past Surgical History:   Procedure Laterality Date    CHOLECYSTECTOMY      HERNIA REPAIR         Family History  No family history on file.     Social History  Social History     Socioeconomic History    Marital status:      Spouse name: Bettie Dyson    Number of children: 2    Years of education: Not on file    Highest education level: Not on file   Occupational History    Not on file   Social Needs    Financial resource strain: Not on file    Food insecurity     Worry: Not on file     Inability: Not on file    Transportation needs     Medical: Not on file     Non-medical: Not on file   Tobacco Use    Smoking status: Former Smoker     Types: Cigarettes    Smokeless tobacco: Never Used   Substance and Sexual Activity    Alcohol use: Not on file    Drug use: Never    Sexual activity: Not Currently     Partners: Male   Lifestyle    Physical activity     Days per week: Not on file     Minutes per session: Not on file    Stress: Not on file   Relationships    Social connections     Talks on phone: Not on file     Gets together: Not on file     Attends Gnosticist service: Not on file     Active member of club or organization: Not on file     Attends meetings of clubs or organizations: Not on file     Relationship status: Not on file    Intimate partner violence     Fear of current or ex partner: Not on file     Emotionally abused: Not on file     Physically abused: Not on file     Forced sexual activity: Not on file   Other Topics Concern    Not on file   Social History Narrative    Not on file         Review of Systems:  History obtained from chart review and the patient  General ROS: + fever or chills  Respiratory ROS: No cough, shortness of breath, wheezing  Cardiovascular ROS: No chest pain or palpitations  Gastrointestinal ROS: No abdominal pain or melena  Genito-Urinary ROS: No dysuria or hematuria  Musculoskeletal ROS: No joint pain or swelling         Objective:  Patient Vitals for the past 24 hrs:   BP Temp Temp src Pulse Resp SpO2   07/02/20 0650 (!) 172/73 99.1 °F (37.3 °C) Temporal 61 18 95 %   07/01/20 1717 (!) 140/67 98.7 °F (37.1 °C) Temporal 64 16 95 %   07/01/20 1507 (!) 141/71 97.5 °F (36.4 °C) Temporal 68 18 95 %       Intake/Output Summary (Last 24 hours) at 7/2/2020 1159  Last data filed at 7/2/2020 0908  Gross per 24 hour   Intake 630 ml   Output 1250 ml   Net -620 ml     General: awake/alert   Chest:  clear to auscultation bilaterally without respiratory distress  CVS: regular rate and rhythm  Abdominal: soft, nontender, normal bowel sounds  Extremities: no cyanosis or edema  Skin: warm/dry    Labs:  BMP:   Recent Labs     06/30/20  0330 07/01/20  0132 07/02/20  1013    141 142   K 3.2* 3.6 3.9    109 108   CO2 21* 21* 24   PHOS 2.5  --   --    BUN 11 11 12   CREATININE 1.3* 1.2 1.3*   CALCIUM 8.8 8.8 9.8     CBC:   Recent Labs     06/30/20  0330   WBC 9.4   HGB 12.9*   HCT 40.8*   MCV 97.1*   *     LIVER PROFILE:   Recent Labs     06/30/20 0330 07/01/20  0132   * 167*   * 120*   BILITOT 1.1 1.1   ALKPHOS 228* 210*     PT/INR: No results for input(s): PROTIME, INR in the last 72 hours. APTT: No results for input(s): APTT in the last 72 hours. BNP:  No results for input(s): BNP in the last 72 hours.   Ionized Calcium:No results for input(s): IONCA in the last 72 hours. Magnesium:  Recent Labs     06/30/20  0330   MG 2.0     Phosphorus:  Recent Labs     06/30/20  0330   PHOS 2.5     HgbA1C: No results for input(s): LABA1C in the last 72 hours. Hepatic:   Recent Labs     06/30/20  0330 07/01/20  0132   ALKPHOS 228* 210*   * 120*   * 167*   PROT 5.0* 5.1*   BILITOT 1.1 1.1   LABALBU 3.3* 3.1*     Lactic Acid: No results for input(s): LACTA in the last 72 hours. Troponin: No results for input(s): CKTOTAL, CKMB, TROPONINT in the last 72 hours. ABGs:   Lab Results   Component Value Date    PHART 7.460 06/25/2020    PO2ART 65.0 06/25/2020    XNY6YMP 25.0 06/25/2020     CRP:  No results for input(s): CRP in the last 72 hours. Sed Rate:  No results for input(s): SEDRATE in the last 72 hours. Culture Results:   Blood Culture Recent:   Recent Labs     06/25/20  0908   BC No growth after 5 days of incubation. Urine Culture Recent :   Recent Labs     06/25/20  1150   LABURIN No growth at 36-48 hours       Radiology reports as per the Radiologist  Radiology: Ct Head Wo Contrast    Result Date: 6/25/2020  EXAM: CT HEAD WO CONTRAST -- 6/25/2020 2:15 PM HISTORY: 79 years, Male, confusion COMPARISON: None DLP: 805 mGy cm. Automated exposure control was utilized to minimize patient radiation dose. TECHNIQUE: Unenhanced axial CT images obtained from vertex to skull base with coronal and sagittal reformats. FINDINGS: No acute intracranial hemorrhage, midline shift, mass effect or large territory cortical infarct. Confluent periventricular and subcortical white matter hypodensities, most likely due to chronic microvascular disease. Ventricles, sulci, basilar cisterns are proportionally prominent suggesting volume loss. Thinning of the ocular lenses may be postoperative or age-related. Paranasal sinuses and mastoid air cells normally aerated. External auditory canals within normal limits. Calvarium appears intact.  Subcutaneous tissues within normal limits. 1. No acute intracranial hemorrhage, midline shift or mass effect. 2. Confluent periventricular and subcortical white matter hypodensities with volume loss, most commonly chronic microvascular changes. If clinical concern for acute infarct, MRI would be a more sensitive exam. Signed by Dr Chris Wilkerson on 6/25/2020 4:22 PM    Us Renal Complete    Result Date: 6/27/2020  US RENAL COMPLETE - 6/27/2020 9:30 AM Indication: Acute kidney injury Comparison: None available. Findings: RIGHT kidney measures 9 cm in length. RIGHT renal cortical thickness and echogenicity are within normal limits. No shadowing calculi or hydronephrosis. LEFT kidney measures 9.5 cm in length. LEFT renal cortical thickness and echogenicity are within normal limits. 2.1 cm cyst in the LEFT kidney upper pole without evidence of internal vascularity or complex features. No urolithiasis or hydronephrosis. Urinary bladder is decompressed by Rojas catheter. 1.  Negative for hydronephrosis or renal atrophy. 2.  2.1 cm cyst in the LEFT kidney without complex features. Signed by Dr Antonietta Linton on 6/27/2020 12:05 PM    Xr Chest Portable    Result Date: 6/25/2020  EXAMINATION:  XR CHEST PORTABLE  6/25/2020 1:12 PM HISTORY: Shortness of breath. COMPARISON: No comparison study. FINDINGS:  There are patchy infiltrates in the perihilar regions and lung bases. There is minimal blunting of the costophrenic angles. Heart size is upper limits of normal. Thoracic aorta is atheromatous. 1. Bilateral infiltrates likely representing pulmonary edema. Pneumonia is possible. 2. There may be small pleural effusions.  Heart size is upper limits of normal. Signed by Dr Claude Inman on 6/25/2020 1:23 PM    Vl Dup Lower Extremity Venous Bilateral    Result Date: 6/25/2020  Vascular Lower Extremities DVT Study Procedure  Demographics   Patient Name     Latisha Flores Age                    [de-identified]   Patient Number   [id number]       Gender Male   Visit Number     [id number]    Interpreting Physician Sixto Watson MD   Date of Birth    1940   Referring Physician    [employer]   Accession Number [id number]   Irene 70, RVT  Procedure Type of Study:   Veins:Lower Extremities DVT Study, VL LOWER EXTREMITY BILATERAL VENOUS  DUPLEX . Technical Quality:Limited visualization due to combative patient. Impression   Unable to complete exam because patient was shaking uncontrollably. Only  right common femoral vein and saphenofemoral junction were able to be  imaged. No DVT/SVT seen in these areas. The remainder of the right leg  could not be imaged and therefore could not \"rule out\" deep or superficial  venous thrombosis. Signature   ----------------------------------------------------------------  Electronically signed by Sixto Watson MD(Interpreting  physician) on 06/25/2020 05:28 PM  ----------------------------------------------------------------  Velocities are measured in cm/s ; Diameters are measured in mm Right Lower Extremities DVT Study Measurements Right 2D Measurements +------------------------------------+----------+---------------+----------+ ! Location                            ! Visualized! Compressibility! Thrombosis! +------------------------------------+----------+---------------+----------+ ! Sapheno Femoral Junction            ! Yes       ! Yes            ! None      ! +------------------------------------+----------+---------------+----------+ ! Common Femoral                      !Yes       ! Yes            ! None      ! +------------------------------------+----------+---------------+----------+    Cta Pulmonary W Contrast    Result Date: 6/25/2020  CTA PULMONARY W CONTRAST 6/25/2020 11:30 AM HISTORY: Elevated d-dimer. Shortness of breath.  Clinical finding is worrisome for pulmonary embolus Comparison: None Technique: CT evaluation of the chest was performed with intravenous contrast. 2 mm transaxial images were obtained. Maximum intensity projection reconstruction angiographic images were generated . Two-dimensional Coronal and sagittal reconstruction images of the pulmonary arteries, aorta and great vessels were also generated. .Radiation dose equals  mGy cm. AUTOMATED EXPOSURE CONTROL dose reduction technique was implemented Findings: There is image quadrant degradation related to breathing motion artifact. Pulmonary arteries opacify with iodinated contrast without intraluminal filling defects or convincing CT angiographic evidence of pulmonary embolus. The aorta is normal in caliber, without aneurysm or dissection. There is ectasia of the aortic arch with aortic calcifications. There is cardiomegaly with coronary artery calcifications. There are calcified left hilar lymph nodes. There is no mediastinal hilar lymphadenopathy. There are small bilateral pleural effusions with lower lobe airspace opacities, suspect atelectasis. There are emphysematous changes in the upper lobes with bleb formation. Evaluation of the lung fields challenging due to motion, however no definite consolidating parenchymal infiltrates identified. Thyroid gland enlargement with nodules. Left-sided nodule measures 2.3 cm. Right-sided nodule measures 1.6 cm. There is no axillary lymph node enlargement. Limited imaging the upper abdomen demonstrate no acute abnormality. There is pectus excavatum anterior chest wall deformity. No acute osseous abnormalities observed. Impression: 1. No CT angiographic evidence of pulmonary embolus or acute aortic pathology. 2. Small bilateral pleural effusions with associated airspace opacities likely atelectasis. 3. Cardiomegaly with atherosclerotic calcifications. 4. Thyroid enlargement with nodules. 5. Pectus excavatum anterior chest wall deformity.  Signed by Dr Alfredo Dewitt on 6/25/2020 4:26 PM       Assessment   Acute kidney injury-uncertain baseline  Possible ehrlichiosis  Metabolic acidosis  Hypertension  Leukopenia/neutropenia  Thrombocytopenia  Elevated transaminases  Microscopic hematuria    Plan:  Antibiotics per ID service  Monitor blood pressure trends  Monitor labs  continue IV fluids  Repeat urinalysis ok  Replace K again as needed, mag was adequate, recheck level tomorrow  Discussed with patient, family, Joann Carlson MD  07/02/20  11:59 AM

## 2020-07-02 NOTE — PROGRESS NOTES
Nutrition Assessment (Low Risk)    Type and Reason for Visit: Initial    Nutrition Assessment: LOS assessment. Pt reports his appetite is great. PO intake varies but wt has remained stable. Pt is at low risk for nutritional decline. Will continue to monitor for nutrition needs.      Malnutrition Assessment:  · Malnutrition Status: No malnutrition    Nutrition Risk Level   Risk Level: Low    Nutrition Diagnosis:   · Problem: No nutrition diagnosis at this time    Nutrition Intervention:  Food and/or Delivery: Continue current diet  Nutrition Education/Counseling/Coordination of Care:  Continued Inpatient Monitoring      Electronically signed by Hai Dorsey RD on 7/2/20 at 11:08 AM CDT    Contact Number: [phone]

## 2020-07-02 NOTE — PROGRESS NOTES
Spoke to pts daughter about eyedrops, she says she is not sure what kind he uses she thinks they are over the counter, but she will ask pts wife today and if needed they can bring them from home.

## 2020-07-02 NOTE — PROGRESS NOTES
Hospitalist Progress Note    Patient:  Geni Martell  YOB: 1940  Date of Service: 7/2/2020  MRN: [id number]   Acct: [de-identified]   Primary Care Physician: No primary care provider on file. Advance Directive: Full Code  Admit Date: 6/25/2020       Hospital Day: 7  Referring Provider: Isamar Skelton DO    Patient Seen, Chart, Consults, Notes, Labs, Radiology studies reviewed. Subjective: Joseph iRzzo is a [de-identified] y.o. male  whom we are following for acute kidney injury, febrile illness, syncope, symptomatic bradycardia. Overall he is doing much better. 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 His GFR is also improved. He is working with physical therapy. Allergies:  Patient has no known allergies.     Medicines:  Current Facility-Administered Medications   Medication Dose Route Frequency Provider Last Rate Last Dose    amLODIPine (NORVASC) tablet 5 mg  5 mg Oral Daily Isamar Skelton DO   5 mg at 07/02/20 1115    doxycycline monohydrate (MONODOX) capsule 100 mg  100 mg Oral 2 times per day Alexander Artis MD   100 mg at 07/02/20 1115    potassium chloride (KLOR-CON M) extended release tablet 40 mEq  40 mEq Oral PRN Reji Medina MD        Or    potassium bicarb-citric acid (EFFER-K) effervescent tablet 40 mEq  40 mEq Oral PRN Reji Medina MD   40 mEq at 06/30/20 1002    Or    potassium chloride 10 mEq/100 mL IVPB (Peripheral Line)  10 mEq Intravenous PRN Reji Medina MD        magnesium sulfate 1 g in dextrose 5% 100 mL IVPB  1 g Intravenous PRN Reji Medina MD        acetaminophen (TYLENOL) tablet 650 mg  650 mg Oral Q4H PRN Alexander Artis MD   650 mg at 07/01/20 2340    melatonin tablet 3 mg  3 mg Oral Nightly Lakisha Merlos MD   3 mg at 07/01/20 1932    hydrOXYzine (ATARAX) tablet 12.5 mg  12.5 mg Oral Q6H PRN FEROZ Yeager   12.5 mg at 07/01/20 2340    lisinopril (PRINIVIL;ZESTRIL) tablet 20 mg  20 mg Oral Daily Kyler Sampson MD 20 mg at 07/02/20 1116    aspirin chewable tablet 81 mg  81 mg Oral Daily Raine Masters MD   81 mg at 07/02/20 1116    polyethylene glycol (GLYCOLAX) packet 17 g  17 g Oral Daily PRN Raine Masters MD        ondansetron (ZOFRAN-ODT) disintegrating tablet 4 mg  4 mg Oral Q8H PRN Raine Masters MD        Or    ondansetron [facility]) injection 4 mg  4 mg Intravenous Q6H PRN Raine Masters MD        nitroGLYCERIN (NITROSTAT) SL tablet 0.4 mg  0.4 mg Sublingual Q5 Min PRN Raine Masters MD        lamoTRIgine (LAMICTAL) tablet 25 mg  25 mg Oral Daily Raine Masters MD   25 mg at 07/02/20 1115    levothyroxine (SYNTHROID) tablet 50 mcg  50 mcg Oral Daily Raine Masters MD   50 mcg at 07/02/20 0547    sodium chloride flush 0.9 % injection 10 mL  10 mL Intravenous 2 times per day Raine Masters MD   10 mL at 07/02/20 1116    sodium chloride flush 0.9 % injection 10 mL  10 mL Intravenous PRN Raine Masters MD        gabapentin (NEURONTIN) capsule 300 mg  300 mg Oral TID Raine Masters MD   300 mg at 07/02/20 1119    ipratropium-albuterol (DUONEB) nebulizer solution 1 ampule  1 ampule Inhalation Q4H WA Raine Masters MD   1 ampule at 07/02/20 1055    hydrALAZINE (APRESOLINE) injection 10 mg  10 mg Intravenous Q4H PRN Raine Masters MD   10 mg at 06/27/20 0130    morphine injection 1 mg  1 mg Intravenous Q4H PRN Raine Masters MD   1 mg at 06/26/20 0211    morphine injection 2 mg  2 mg Intravenous Q4H PRN Raine Masters MD   2 mg at 06/27/20 0208    naloxone (NARCAN) injection 0.4 mg  0.4 mg Intravenous PRN Raine Masters MD           Past Medical History:  Past Medical History:   Diagnosis Date    Hypertension     Palliative care patient 06/26/2020    Sleep apnea     Thyroid disease     Trigeminal neuropathy        Past Surgical History:  Past Surgical History:   Procedure Laterality Date    CHOLECYSTECTOMY      HERNIA REPAIR         Family History  No family history on file.     Social History  Social History     Socioeconomic History    Marital status:      Spouse name: Bettie Dyson    Number of children: 2    Years of education: Not on file    Highest education level: Not on file   Occupational History    Not on file   Social Needs    Financial resource strain: Not on file    Food insecurity     Worry: Not on file     Inability: Not on file    Transportation needs     Medical: Not on file     Non-medical: Not on file   Tobacco Use    Smoking status: Former Smoker     Types: Cigarettes    Smokeless tobacco: Never Used   Substance and Sexual Activity    Alcohol use: Not on file    Drug use: Never    Sexual activity: Not Currently     Partners: Male   Lifestyle    Physical activity     Days per week: Not on file     Minutes per session: Not on file    Stress: Not on file   Relationships    Social connections     Talks on phone: Not on file     Gets together: Not on file     Attends Buddhist service: Not on file     Active member of club or organization: Not on file     Attends meetings of clubs or organizations: Not on file     Relationship status: Not on file    Intimate partner violence     Fear of current or ex partner: Not on file     Emotionally abused: Not on file     Physically abused: Not on file     Forced sexual activity: Not on file   Other Topics Concern    Not on file   Social History Narrative    Not on file         Review of Systems:    Review of Systems        Objective:  Blood pressure (!) 172/73, pulse 61, temperature 99.1 °F (37.3 °C), temperature source Temporal, resp. rate 18, height 6' (1.829 m), weight 172 lb 1.6 oz (78.1 kg), SpO2 95 %.     Intake/Output Summary (Last 24 hours) at 7/2/2020 1232  Last data filed at 7/2/2020 0908  Gross per 24 hour   Intake 630 ml   Output 1250 ml   Net -620 ml       Physical Exam    Labs:  BMP:   Recent Labs     06/30/20  0330 07/01/20  0132 07/02/20  1013    141 142   K 3.2* 3.6 3.9    109 108   CO2 21* 21* 24   PHOS 2.5 --   --    BUN 11 11 12   CREATININE 1.3* 1.2 1.3*   CALCIUM 8.8 8.8 9.8     CBC:   Recent Labs     06/30/20  0330   WBC 9.4   HGB 12.9*   HCT 40.8*   MCV 97.1*   *     LIVER PROFILE:   Recent Labs     06/30/20  0330 07/01/20 0132   * 167*   * 120*   BILITOT 1.1 1.1   ALKPHOS 228* 210*     PT/INR: No results for input(s): PROTIME, INR in the last 72 hours. APTT: No results for input(s): APTT in the last 72 hours. BNP:  No results for input(s): BNP in the last 72 hours. Ionized Calcium:No results for input(s): IONCA in the last 72 hours. Magnesium:  Recent Labs     06/30/20  0330   MG 2.0     Phosphorus:  Recent Labs     06/30/20  0330   PHOS 2.5     HgbA1C: No results for input(s): LABA1C in the last 72 hours. Hepatic:   Recent Labs     06/30/20 0330 07/01/20 0132   ALKPHOS 228* 210*   * 120*   * 167*   PROT 5.0* 5.1*   BILITOT 1.1 1.1   LABALBU 3.3* 3.1*     Lactic Acid: No results for input(s): LACTA in the last 72 hours. Troponin: No results for input(s): CKTOTAL, CKMB, TROPONINT in the last 72 hours. ABGs: No results for input(s): PH, PCO2, PO2, HCO3, O2SAT in the last 72 hours. CRP:  No results for input(s): CRP in the last 72 hours. Sed Rate:  No results for input(s): SEDRATE in the last 72 hours. Cultures:   No results for input(s): CULTURE in the last 72 hours. No results for input(s): BCWaleska in the last 72 hours. No results for input(s): CXSURG in the last 72 hours. Radiology reports as per the Radiologist  Radiology: Ct Head Wo Contrast    Result Date: 6/25/2020  EXAM: CT HEAD WO CONTRAST -- 6/25/2020 2:15 PM HISTORY: 79 years, Male, confusion COMPARISON: None DLP: 805 mGy cm. Automated exposure control was utilized to minimize patient radiation dose. TECHNIQUE: Unenhanced axial CT images obtained from vertex to skull base with coronal and sagittal reformats.  FINDINGS: No acute intracranial hemorrhage, midline shift, mass effect or large VENOUS  DUPLEX . Technical Quality:Limited visualization due to combative patient. Impression   Unable to complete exam because patient was shaking uncontrollably. Only  right common femoral vein and saphenofemoral junction were able to be  imaged. No DVT/SVT seen in these areas. The remainder of the right leg  could not be imaged and therefore could not \"rule out\" deep or superficial  venous thrombosis. Signature   ----------------------------------------------------------------  Electronically signed by Sarai Emmanuel MD(Interpreting  physician) on 06/25/2020 05:28 PM  ----------------------------------------------------------------  Velocities are measured in cm/s ; Diameters are measured in mm Right Lower Extremities DVT Study Measurements Right 2D Measurements +------------------------------------+----------+---------------+----------+ ! Location                            ! Visualized! Compressibility! Thrombosis! +------------------------------------+----------+---------------+----------+ ! Sapheno Femoral Junction            ! Yes       ! Yes            ! None      ! +------------------------------------+----------+---------------+----------+ ! Common Femoral                      !Yes       ! Yes            ! None      ! +------------------------------------+----------+---------------+----------+    Cta Pulmonary W Contrast    Result Date: 6/25/2020  CTA PULMONARY W CONTRAST 6/25/2020 11:30 AM HISTORY: Elevated d-dimer. Shortness of breath. Clinical finding is worrisome for pulmonary embolus Comparison: None Technique: CT evaluation of the chest was performed with intravenous contrast. 2 mm transaxial images were obtained. Maximum intensity projection reconstruction angiographic images were generated . Two-dimensional Coronal and sagittal reconstruction images of the pulmonary arteries, aorta and great vessels were also generated. .Radiation dose equals  mGy cm.  AUTOMATED EXPOSURE CONTROL dose reduction technique was implemented Findings: There is image quadrant degradation related to breathing motion artifact. Pulmonary arteries opacify with iodinated contrast without intraluminal filling defects or convincing CT angiographic evidence of pulmonary embolus. The aorta is normal in caliber, without aneurysm or dissection. There is ectasia of the aortic arch with aortic calcifications. There is cardiomegaly with coronary artery calcifications. There are calcified left hilar lymph nodes. There is no mediastinal hilar lymphadenopathy. There are small bilateral pleural effusions with lower lobe airspace opacities, suspect atelectasis. There are emphysematous changes in the upper lobes with bleb formation. Evaluation of the lung fields challenging due to motion, however no definite consolidating parenchymal infiltrates identified. Thyroid gland enlargement with nodules. Left-sided nodule measures 2.3 cm. Right-sided nodule measures 1.6 cm. There is no axillary lymph node enlargement. Limited imaging the upper abdomen demonstrate no acute abnormality. There is pectus excavatum anterior chest wall deformity. No acute osseous abnormalities observed. Impression: 1. No CT angiographic evidence of pulmonary embolus or acute aortic pathology. 2. Small bilateral pleural effusions with associated airspace opacities likely atelectasis. 3. Cardiomegaly with atherosclerotic calcifications. 4. Thyroid enlargement with nodules. 5. Pectus excavatum anterior chest wall deformity. Signed by Dr Inés Wise on 6/25/2020 4:26 PM       Assessment     Symptomatic bradycardia. Monitor.     NIDHI (acute kidney injury). Probable perfusion related. Now resolved. Altered mental status.   Resolved.     Ehrlichia Chaffeensis  Continue doxycycline      Barry Henry DO

## 2020-07-02 NOTE — CARE COORDINATION
Spoke with patient, wife, and daughter regarding MD orders for [facility] services. Pt agreeable and chose [facility]. Spoke with Debbie/Intake and referral accepted and faxed. Please notify [facility] when patient discharges and Fax DC Summary,  DC med list and any new [facility] orders. Vanessa Alvarez.:  Phone: [phone]  Fax: [phone]    The Patient and/or patient representative was provided with a choice of provider and agrees   with the discharge plan. [x] Yes [] No    Freedom of choice list was provided with basic dialogue that supports the patient's individualized plan of care/goals, treatment preferences and shares the quality data associated with the providers.  [x] Yes [] No  Electronically signed by Adam Crane RN on 7/2/20 at 2:50 PM CDT

## 2020-07-03 VITALS
OXYGEN SATURATION: 90 % | WEIGHT: 171 LBS | BODY MASS INDEX: 23.16 KG/M2 | DIASTOLIC BLOOD PRESSURE: 74 MMHG | HEART RATE: 59 BPM | RESPIRATION RATE: 20 BRPM | SYSTOLIC BLOOD PRESSURE: 154 MMHG | TEMPERATURE: 97.3 F | HEIGHT: 72 IN

## 2020-07-03 LAB
ALBUMIN SERPL-MCNC: 3.4 G/DL (ref 3.5–5.2)
ALP BLD-CCNC: 186 U/L (ref 40–130)
ALT SERPL-CCNC: 73 U/L (ref 5–41)
ANION GAP SERPL CALCULATED.3IONS-SCNC: 14 MMOL/L (ref 7–19)
AST SERPL-CCNC: 68 U/L (ref 5–40)
BILIRUB SERPL-MCNC: 1.2 MG/DL (ref 0.2–1.2)
BILIRUBIN DIRECT: 0.6 MG/DL (ref 0–0.3)
BILIRUBIN, INDIRECT: 0.6 MG/DL (ref 0.1–1)
BUN BLDV-MCNC: 11 MG/DL (ref 8–23)
CALCIUM SERPL-MCNC: 9.4 MG/DL (ref 8.8–10.2)
CHLORIDE BLD-SCNC: 106 MMOL/L (ref 98–111)
CO2: 22 MMOL/L (ref 22–29)
CREAT SERPL-MCNC: 1.3 MG/DL (ref 0.5–1.2)
GFR NON-AFRICAN AMERICAN: 53
GLUCOSE BLD-MCNC: 101 MG/DL (ref 74–109)
MAGNESIUM: 1.9 MG/DL (ref 1.6–2.4)
POTASSIUM REFLEX MAGNESIUM: 3.7 MMOL/L (ref 3.5–5)
SODIUM BLD-SCNC: 142 MMOL/L (ref 136–145)
TOTAL PROTEIN: 5.6 G/DL (ref 6.6–8.7)

## 2020-07-03 PROCEDURE — 80048 BASIC METABOLIC PNL TOTAL CA: CPT

## 2020-07-03 PROCEDURE — 83735 ASSAY OF MAGNESIUM: CPT

## 2020-07-03 PROCEDURE — 6370000000 HC RX 637 (ALT 250 FOR IP): Performed by: INTERNAL MEDICINE

## 2020-07-03 PROCEDURE — 94640 AIRWAY INHALATION TREATMENT: CPT

## 2020-07-03 PROCEDURE — 6370000000 HC RX 637 (ALT 250 FOR IP): Performed by: HOSPITALIST

## 2020-07-03 PROCEDURE — 80076 HEPATIC FUNCTION PANEL: CPT

## 2020-07-03 PROCEDURE — 36415 COLL VENOUS BLD VENIPUNCTURE: CPT

## 2020-07-03 PROCEDURE — 97116 GAIT TRAINING THERAPY: CPT

## 2020-07-03 PROCEDURE — 2580000003 HC RX 258: Performed by: HOSPITALIST

## 2020-07-03 RX ORDER — DOXYCYCLINE 100 MG/1
100 CAPSULE ORAL EVERY 12 HOURS SCHEDULED
Qty: 20 CAPSULE | Refills: 0 | Status: SHIPPED | OUTPATIENT
Start: 2020-07-03 | End: 2020-07-13

## 2020-07-03 RX ORDER — LIDOCAINE 4 G/G
1 PATCH TOPICAL DAILY
Status: DISCONTINUED | OUTPATIENT
Start: 2020-07-03 | End: 2020-07-03 | Stop reason: HOSPADM

## 2020-07-03 RX ORDER — AMLODIPINE BESYLATE 10 MG/1
10 TABLET ORAL DAILY
Qty: 30 TABLET | Refills: 3 | Status: SHIPPED | OUTPATIENT
Start: 2020-07-04

## 2020-07-03 RX ORDER — ASPIRIN 81 MG/1
81 TABLET ORAL DAILY
Qty: 30 TABLET | Refills: 5 | Status: SHIPPED | OUTPATIENT
Start: 2020-07-03

## 2020-07-03 RX ORDER — ASPIRIN 81 MG/1
81 TABLET, CHEWABLE ORAL DAILY
Qty: 30 TABLET | Refills: 3 | Status: SHIPPED | OUTPATIENT
Start: 2020-07-04

## 2020-07-03 RX ORDER — DOXYCYCLINE HYCLATE 100 MG
100 TABLET ORAL 2 TIMES DAILY
Qty: 10 TABLET | Refills: 0 | Status: SHIPPED | OUTPATIENT
Start: 2020-07-03 | End: 2020-07-08

## 2020-07-03 RX ORDER — AMLODIPINE BESYLATE 10 MG/1
10 TABLET ORAL DAILY
Qty: 30 TABLET | Refills: 3 | Status: SHIPPED | OUTPATIENT
Start: 2020-07-03

## 2020-07-03 RX ADMIN — DOXYCYCLINE 100 MG: 50 CAPSULE ORAL at 09:27

## 2020-07-03 RX ADMIN — IPRATROPIUM BROMIDE AND ALBUTEROL SULFATE 1 AMPULE: .5; 3 SOLUTION RESPIRATORY (INHALATION) at 10:54

## 2020-07-03 RX ADMIN — SODIUM CHLORIDE, PRESERVATIVE FREE 10 ML: 5 INJECTION INTRAVENOUS at 09:17

## 2020-07-03 RX ADMIN — ASPIRIN 81 MG 81 MG: 81 TABLET ORAL at 09:17

## 2020-07-03 RX ADMIN — AMLODIPINE BESYLATE 10 MG: 5 TABLET ORAL at 09:18

## 2020-07-03 RX ADMIN — GABAPENTIN 400 MG: 400 CAPSULE ORAL at 09:18

## 2020-07-03 RX ADMIN — LAMOTRIGINE 25 MG: 25 TABLET ORAL at 09:19

## 2020-07-03 RX ADMIN — GABAPENTIN 400 MG: 400 CAPSULE ORAL at 13:45

## 2020-07-03 RX ADMIN — LISINOPRIL 20 MG: 20 TABLET ORAL at 09:20

## 2020-07-03 RX ADMIN — LEVOTHYROXINE SODIUM 50 MCG: 50 TABLET ORAL at 09:18

## 2020-07-03 RX ADMIN — IPRATROPIUM BROMIDE AND ALBUTEROL SULFATE 1 AMPULE: .5; 3 SOLUTION RESPIRATORY (INHALATION) at 07:21

## 2020-07-03 ASSESSMENT — PAIN DESCRIPTION - LOCATION: LOCATION: HEAD;NECK

## 2020-07-03 ASSESSMENT — PAIN DESCRIPTION - ORIENTATION: ORIENTATION: RIGHT

## 2020-07-03 ASSESSMENT — ENCOUNTER SYMPTOMS: EYE PAIN: 0

## 2020-07-03 ASSESSMENT — PAIN SCALES - GENERAL: PAINLEVEL_OUTOF10: 7

## 2020-07-03 NOTE — PROGRESS NOTES
Hospitalist Progress Note    Patient:  Georgie Andre  YOB: 1940  Date of Service: 7/3/2020  MRN: [id number]   Acct: [de-identified]   Primary Care Physician: No primary care provider on file. Advance Directive: Full Code  Admit Date: 6/25/2020       Hospital Day: 8  Referring Provider: Marko Su MD    Patient Seen, Chart, Consults, Notes, Labs, Radiology studies reviewed. Subjective: Joseph Gómez is a [de-identified] y.o. male  whom we are following for acute kidney injury, febrile illness, syncope, symptomatic bradycardia. Overall he is doing much better. 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 His GFR is also improved. He is working with physical therapy, trigeminal neuralgia flared   Allergies:  Patient has no known allergies.     Medicines:  Current Facility-Administered Medications   Medication Dose Route Frequency Provider Last Rate Last Dose    amLODIPine (NORVASC) tablet 10 mg  10 mg Oral Daily Gabo Moore DO        gabapentin (NEURONTIN) capsule 400 mg  400 mg Oral 4x Daily Gabo Moore DO   400 mg at 07/02/20 2259    doxycycline monohydrate (MONODOX) capsule 100 mg  100 mg Oral 2 times per day Abhijeet Milton MD   100 mg at 07/02/20 2259    potassium chloride (KLOR-CON M) extended release tablet 40 mEq  40 mEq Oral PRN Kentrell Del Real MD        Or    potassium bicarb-citric acid (EFFER-K) effervescent tablet 40 mEq  40 mEq Oral PRN Kentrell Del Real MD   40 mEq at 06/30/20 1002    Or    potassium chloride 10 mEq/100 mL IVPB (Peripheral Line)  10 mEq Intravenous PRN Kentrell Del Real MD        magnesium sulfate 1 g in dextrose 5% 100 mL IVPB  1 g Intravenous PRN Kentrell Del Real MD        acetaminophen (TYLENOL) tablet 650 mg  650 mg Oral Q4H PRN Abhijeet Milton MD   650 mg at 07/01/20 2340    melatonin tablet 3 mg  3 mg Oral Nightly Marko Su MD   3 mg at 07/02/20 2300    hydrOXYzine (ATARAX) tablet 12.5 mg  12.5 mg Oral Q6H PRN Delia Spencer FEROZ Hood   12.5 mg at 07/01/20 2340    lisinopril (PRINIVIL;ZESTRIL) tablet 20 mg  20 mg Oral Daily Rudy Stephens MD   20 mg at 07/02/20 1116    aspirin chewable tablet 81 mg  81 mg Oral Daily Mihaela Vicente MD   81 mg at 07/02/20 1116    polyethylene glycol (GLYCOLAX) packet 17 g  17 g Oral Daily PRN Mihaela Vicente MD        ondansetron (ZOFRAN-ODT) disintegrating tablet 4 mg  4 mg Oral Q8H PRN Mihaela Vicente MD        Or    ondansetron [facility]) injection 4 mg  4 mg Intravenous Q6H PRN Mihaela Vicente MD        nitroGLYCERIN (NITROSTAT) SL tablet 0.4 mg  0.4 mg Sublingual Q5 Min PRN Mihaela Vicente MD        lamoTRIgine (LAMICTAL) tablet 25 mg  25 mg Oral Daily Mihaela Vicente MD   25 mg at 07/02/20 1115    levothyroxine (SYNTHROID) tablet 50 mcg  50 mcg Oral Daily Mihaela Vicente MD   50 mcg at 07/02/20 0547    sodium chloride flush 0.9 % injection 10 mL  10 mL Intravenous 2 times per day Mihaela Vicente MD   10 mL at 07/02/20 2300    sodium chloride flush 0.9 % injection 10 mL  10 mL Intravenous PRN Mihaela Vicente MD        ipratropium-albuterol (DUONEB) nebulizer solution 1 ampule  1 ampule Inhalation Q4H WA Mihaela Vicente MD   1 ampule at 07/03/20 4668    hydrALAZINE (APRESOLINE) injection 10 mg  10 mg Intravenous Q4H PRN Mihaela Vicente MD   10 mg at 06/27/20 0130    morphine injection 1 mg  1 mg Intravenous Q4H PRN Mihaela Vicente MD   1 mg at 06/26/20 0211    morphine injection 2 mg  2 mg Intravenous Q4H PRN Mihaela Vicente MD   2 mg at 06/27/20 0208    naloxone (NARCAN) injection 0.4 mg  0.4 mg Intravenous PRN Mihaela Vicente MD           Past Medical History:  Past Medical History:   Diagnosis Date    Hypertension     Palliative care patient 06/26/2020    Sleep apnea     Thyroid disease     Trigeminal neuropathy        Past Surgical History:  Past Surgical History:   Procedure Laterality Date    CHOLECYSTECTOMY      HERNIA REPAIR         Family History  No family history on file.     Social BILATERAL VENOUS  DUPLEX . Technical Quality:Limited visualization due to combative patient. Impression   Unable to complete exam because patient was shaking uncontrollably. Only  right common femoral vein and saphenofemoral junction were able to be  imaged. No DVT/SVT seen in these areas. The remainder of the right leg  could not be imaged and therefore could not \"rule out\" deep or superficial  venous thrombosis. Signature   ----------------------------------------------------------------  Electronically signed by Jerrilyn Moritz MD(Interpreting  physician) on 06/25/2020 05:28 PM  ----------------------------------------------------------------  Velocities are measured in cm/s ; Diameters are measured in mm Right Lower Extremities DVT Study Measurements Right 2D Measurements +------------------------------------+----------+---------------+----------+ ! Location                            ! Visualized! Compressibility! Thrombosis! +------------------------------------+----------+---------------+----------+ ! Sapheno Femoral Junction            ! Yes       ! Yes            ! None      ! +------------------------------------+----------+---------------+----------+ ! Common Femoral                      !Yes       ! Yes            ! None      ! +------------------------------------+----------+---------------+----------+    Cta Pulmonary W Contrast    Result Date: 6/25/2020  CTA PULMONARY W CONTRAST 6/25/2020 11:30 AM HISTORY: Elevated d-dimer. Shortness of breath. Clinical finding is worrisome for pulmonary embolus Comparison: None Technique: CT evaluation of the chest was performed with intravenous contrast. 2 mm transaxial images were obtained. Maximum intensity projection reconstruction angiographic images were generated . Two-dimensional Coronal and sagittal reconstruction images of the pulmonary arteries, aorta and great vessels were also generated. .Radiation dose equals  mGy cm.  AUTOMATED EXPOSURE CONTROL dose reduction technique was implemented Findings: There is image quadrant degradation related to breathing motion artifact. Pulmonary arteries opacify with iodinated contrast without intraluminal filling defects or convincing CT angiographic evidence of pulmonary embolus. The aorta is normal in caliber, without aneurysm or dissection. There is ectasia of the aortic arch with aortic calcifications. There is cardiomegaly with coronary artery calcifications. There are calcified left hilar lymph nodes. There is no mediastinal hilar lymphadenopathy. There are small bilateral pleural effusions with lower lobe airspace opacities, suspect atelectasis. There are emphysematous changes in the upper lobes with bleb formation. Evaluation of the lung fields challenging due to motion, however no definite consolidating parenchymal infiltrates identified. Thyroid gland enlargement with nodules. Left-sided nodule measures 2.3 cm. Right-sided nodule measures 1.6 cm. There is no axillary lymph node enlargement. Limited imaging the upper abdomen demonstrate no acute abnormality. There is pectus excavatum anterior chest wall deformity. No acute osseous abnormalities observed. Impression: 1. No CT angiographic evidence of pulmonary embolus or acute aortic pathology. 2. Small bilateral pleural effusions with associated airspace opacities likely atelectasis. 3. Cardiomegaly with atherosclerotic calcifications. 4. Thyroid enlargement with nodules. 5. Pectus excavatum anterior chest wall deformity. Signed by Dr Francesco Rios on 6/25/2020 4:26 PM       Assessment     Symptomatic bradycardia. Monitor.     NIDHI (acute kidney injury). Probable perfusion related. Now resolved. Altered mental status.   Resolved.     Francetta Overcast doxycycline      Traci Officer, MD

## 2020-07-03 NOTE — PROGRESS NOTES
CLINICAL PHARMACY NOTE: MEDS TO [address] [facility] Patient?: No  Total # of Prescriptions Filled: 3   The following medications were delivered to the patient:  · doxycline 100mg  · Amlodipine 10mg  · Aspirin 81mg  Total # of Interventions Completed: 0  Time Spent (min): 30    Additional Documentation:

## 2020-07-03 NOTE — CARE COORDINATION
DC Summary, Med List, AVS, and updated orders faxed to [facility].   Electronically signed by Kerri Velazquez RN on 7/3/2020 at 12:29 PM

## 2020-07-03 NOTE — PROGRESS NOTES
INFECTIOUS DISEASES PROGRESS NOTE    Patient:  Maria Del Rosario Burns  YOB: 1940  MRN: [id number]   Admit date: 6/25/2020   Admitting Physician: Jorge Wellington MD  Primary Care Physician: No primary care provider on file. CHIEF COMPLAINT: Quite a bit of pain from his trigeminal neuralgia. Interval History:   Patient's daughter is at bedside. They report he is likely going to go home today. His trigeminal neuralgia pain has worsened. He is not able to chew. He was able to take some liquids and drink Glucerna. Allergies: No Known Allergies    Current Meds: amLODIPine (NORVASC) tablet 10 mg, Daily  gabapentin (NEURONTIN) capsule 400 mg, 4x Daily  doxycycline monohydrate (MONODOX) capsule 100 mg, 2 times per day  potassium chloride (KLOR-CON M) extended release tablet 40 mEq, PRN    Or  potassium bicarb-citric acid (EFFER-K) effervescent tablet 40 mEq, PRN    Or  potassium chloride 10 mEq/100 mL IVPB (Peripheral Line), PRN  magnesium sulfate 1 g in dextrose 5% 100 mL IVPB, PRN  acetaminophen (TYLENOL) tablet 650 mg, Q4H PRN  melatonin tablet 3 mg, Nightly  hydrOXYzine (ATARAX) tablet 12.5 mg, Q6H PRN  lisinopril (PRINIVIL;ZESTRIL) tablet 20 mg, Daily  aspirin chewable tablet 81 mg, Daily  polyethylene glycol (GLYCOLAX) packet 17 g, Daily PRN  ondansetron (ZOFRAN-ODT) disintegrating tablet 4 mg, Q8H PRN    Or  ondansetron (ZOFRAN) injection 4 mg, Q6H PRN  nitroGLYCERIN (NITROSTAT) SL tablet 0.4 mg, Q5 Min PRN  lamoTRIgine (LAMICTAL) tablet 25 mg, Daily  levothyroxine (SYNTHROID) tablet 50 mcg, Daily  sodium chloride flush 0.9 % injection 10 mL, 2 times per day  sodium chloride flush 0.9 % injection 10 mL, PRN  ipratropium-albuterol (DUONEB) nebulizer solution 1 ampule, Q4H WA  hydrALAZINE (APRESOLINE) injection 10 mg, Q4H PRN  morphine injection 1 mg, Q4H PRN  morphine injection 2 mg, Q4H PRN  naloxone (NARCAN) injection 0.4 mg, PRN        Review of Systems   Constitutional: Negative for fever.    Eyes: Negative for pain. Neurological:         face pain secondary to trigeminal neuralgia       Vital Signs:  BP (!) 154/74   Pulse 59   Temp 97.3 °F (36.3 °C) (Temporal)   Resp 20   Ht 6' (1.829 m)   Wt 171 lb (77.6 kg)   SpO2 90%   BMI 23.19 kg/m²   Temp (24hrs), Av °F (36.7 °C), Min:97.3 °F (36.3 °C), Max:98.4 °F (36.9 °C)      Physical Exam  General: The patient is an elderly male, sitting up at bedside with his hand up on his face. HEENT: Sclera anicteric and are injected  Neck: Without meningismus  Respiratory: Effort even and unlabored  Neuro: He was following simple commands without difficulty. Hard of hearing  Psych: He was pleasant and cooperative        LAB RESULTS:    CBC with DIFF:  No results for input(s): WBC, RBC, HGB, HCT, MCV, MCH, MCHC, RDW, PLT, MPV, NEUTOPHILPCT, LYMPHOPCT, MONOPCT, EOSRELPCT, BASOPCT, NEUTROABS, LYMPHSABS, MONOSABS, EOSABS, BASOSABS in the last 72 hours. CMP/BMP:  Recent Labs     20  0132 20  1013 20  0223    142 142   K 3.6 3.9 3.7    108 106   CO2 21* 24 22   ANIONGAP 11 10 14   GLUCOSE 100 106 101   BUN 11 12 11   CREATININE 1.2 1.3* 1.3*   LABGLOM 58* 53* 53*   CALCIUM 8.8 9.8 9.4   PROT 5.1*  --  5.6*   LABALBU 3.1*  --  3.4*   BILITOT 1.1  --  1.2   ALKPHOS 210*  --  186*   *  --  73*   *  --  68*     CMV IgG positive - c/w past infection    CMV IgM negative      Ehrlichia Chaffeensis by PCR Abnormal  2020  7:48 AM [facility]   Detected          [place] spotted fever IgG positive at 1-64-likely insignificant  [facility] spotted fever IgM negative      Culture Results:    No results for input(s): CXSURG in the last 720 hours. Blood Culture Recent:   Recent Labs     20  0908   BC No growth after 5 days of incubation.        [address] and blood culture from -, urine culture negative, COVID-19 test negative, influenza a and B screen negative        RADIOLOGY      Ct Patient Name     Lo Desai Age                    [de-identified]   Patient Number   [id number]       Gender                 Male   Visit Number     [id number]    Interpreting Physician Willard Sandoval MD   Date of Birth    1940   Referring Physician    Diaz Chan   Accession Number [id number]   [address], RVT  Procedure Type of Study:   Veins:Lower Extremities DVT Study, VL LOWER EXTREMITY BILATERAL VENOUS  DUPLEX . Technical Quality:Limited visualization due to combative patient. Impression   Unable to complete exam because patient was shaking uncontrollably. Only  right common femoral vein and saphenofemoral junction were able to be  imaged. No DVT/SVT seen in these areas. The remainder of the right leg  could not be imaged and therefore could not \"rule out\" deep or superficial  venous thrombosis. Signature   ----------------------------------------------------------------  Electronically signed by Willard Sandoval MD(Interpreting  physician) on 06/25/2020 05:28 PM        Cta Pulmonary W Contrast    Result Date: 6/25/2020  CTA PULMONARY W CONTRAST 6/25/2020 11:30 AM HISTORY: Elevated d-dimer. Shortness of breath. Clinical finding is worrisome for pulmonary embolus Comparison: None Technique: CT evaluation of the chest was performed with intravenous contrast. 2 mm transaxial images were obtained. Maximum intensity projection reconstruction angiographic images were generated . Two-dimensional Coronal and sagittal reconstruction images of the pulmonary arteries, aorta and great vessels were also generated. .Radiation dose equals  mGy cm. AUTOMATED EXPOSURE CONTROL dose reduction technique was implemented Findings: There is image quadrant degradation related to breathing motion artifact. Pulmonary arteries opacify with iodinated contrast without intraluminal filling defects or convincing CT angiographic evidence of pulmonary embolus.  The aorta is normal in caliber, without aneurysm or dissection. There is ectasia of the aortic arch with aortic calcifications. There is cardiomegaly with coronary artery calcifications. There are calcified left hilar lymph nodes. There is no mediastinal hilar lymphadenopathy. There are small bilateral pleural effusions with lower lobe airspace opacities, suspect atelectasis. There are emphysematous changes in the upper lobes with bleb formation. Evaluation of the lung fields challenging due to motion, however no definite consolidating parenchymal infiltrates identified. Thyroid gland enlargement with nodules. Left-sided nodule measures 2.3 cm. Right-sided nodule measures 1.6 cm. There is no axillary lymph node enlargement. Limited imaging the upper abdomen demonstrate no acute abnormality. There is pectus excavatum anterior chest wall deformity. No acute osseous abnormalities observed. Impression: 1. No CT angiographic evidence of pulmonary embolus or acute aortic pathology. 2. Small bilateral pleural effusions with associated airspace opacities likely atelectasis. 3. Cardiomegaly with atherosclerotic calcifications. 4. Thyroid enlargement with nodules. 5. Pectus excavatum anterior chest wall deformity. Signed by Dr Saul Trujillo on 6/25/2020 4:26 PM          Patient Active Problem List   Diagnosis    Symptomatic bradycardia    Palliative care patient    N[facility]acute [facility]) ([facility] [address]    Altered mental status    Fever, unspecified       IMPRESSION:    1. Ehrlichia  infection-detected by PCR-improving on doxycycline  2. Trigeminal neuralgia-worsened but patient's gabapentin had to be decreased initially because of mental status changes. This is been increased back to his home dose. 3. Transaminitis secondary to Ehrlichia-improving  4. Acute kidney injury-improved and stable  5. Leukopenia-resolved  6. Thrombocytopenia-improving when last checked    RECOMMENDATIONS :  · Continue doxycycline- oral.  Recommendations are for full 10-day course.   · Reminded

## 2020-07-03 NOTE — DISCHARGE SUMMARY
Hospitalist   Discharge Summary    Lefty Conner  :  1940  MRN:  [id number]    Admit date:  2020  Discharge date:  7/3/2020    Admitting Physician:  Nikolay Sharif DO    Advance Directive: Full Code    Consults: nephrology , ID     Primary Care Physician:  No primary care provider on file. Discharge Diagnoses: Active Problems:    Symptomatic bradycardia    Palliative care patient    NIDHI (acute kidney injury) ([facility] [address]    Altered mental status    Fever, unspecified  Resolved Problems:    * No resolved hospital problems. *      Significant Diagnostic Studies:   Ct Head Wo Contrast    Result Date: 2020  EXAM: CT HEAD WO CONTRAST -- 2020 2:15 PM HISTORY: 79 years, Male, confusion COMPARISON: None DLP: 805 mGy cm. Automated exposure control was utilized to minimize patient radiation dose. TECHNIQUE: Unenhanced axial CT images obtained from vertex to skull base with coronal and sagittal reformats. FINDINGS: No acute intracranial hemorrhage, midline shift, mass effect or large territory cortical infarct. Confluent periventricular and subcortical white matter hypodensities, most likely due to chronic microvascular disease. Ventricles, sulci, basilar cisterns are proportionally prominent suggesting volume loss. Thinning of the ocular lenses may be postoperative or age-related. Paranasal sinuses and mastoid air cells normally aerated. External auditory canals within normal limits. Calvarium appears intact. Subcutaneous tissues within normal limits. 1. No acute intracranial hemorrhage, midline shift or mass effect. 2. Confluent periventricular and subcortical white matter hypodensities with volume loss, most commonly chronic microvascular changes. If clinical concern for acute infarct, MRI would be a more sensitive exam. Signed by Dr Erik Herbert on 2020 4:22 PM    Xr Chest Portable    Result Date: 2020  EXAMINATION:  XR CHEST PORTABLE  2020 1:12 PM HISTORY: Shortness of breath. COMPARISON: No comparison study. FINDINGS:  There are patchy infiltrates in the perihilar regions and lung bases. There is minimal blunting of the costophrenic angles. Heart size is upper limits of normal. Thoracic aorta is atheromatous. 1. Bilateral infiltrates likely representing pulmonary edema. Pneumonia is possible. 2. There may be small pleural effusions. Heart size is upper limits of normal. Signed by Dr Jay Blanton on 6/25/2020 1:23 PM    Vl Dup Lower Extremity Venous Bilateral    Result Date: 6/25/2020  Vascular Lower Extremities DVT Study Procedure  Demographics   Patient Name     Ariella Monsivais Age                    [de-identified]   Patient Number   [id number]       Gender                 Male   Visit Number     [id number]    Interpreting Physician Sabrina Ruiz MD   Date of Birth    1940   Referring Physician    Parveen Best   Accession Number [id number]   [address]  Procedure Type of Study:   Veins:Lower Extremities DVT Study, VL LOWER EXTREMITY BILATERAL VENOUS  DUPLEX . Technical Quality:Limited visualization due to combative patient. Impression   Unable to complete exam because patient was shaking uncontrollably. Only  right common femoral vein and saphenofemoral junction were able to be  imaged. No DVT/SVT seen in these areas. The remainder of the right leg  could not be imaged and therefore could not \"rule out\" deep or superficial  venous thrombosis. Signature   ----------------------------------------------------------------  Electronically signed by Sabrina Ruiz MD(Interpreting  physician) on 06/25/2020 05:28 PM  ----------------------------------------------------------------  Velocities are measured in cm/s ; Diameters are measured in mm Right Lower Extremities DVT Study Measurements Right 2D Measurements +------------------------------------+----------+---------------+----------+ ! Location                            ! Visualized! Compressibility! Thrombosis! +------------------------------------+----------+---------------+----------+ ! Sapheno Femoral Junction            ! Yes       ! Yes            ! None      ! +------------------------------------+----------+---------------+----------+ ! Common Femoral                      !Yes       ! Yes            ! None      ! +------------------------------------+----------+---------------+----------+    Cta Pulmonary W Contrast    Result Date: 6/25/2020  CTA PULMONARY W CONTRAST 6/25/2020 11:30 AM HISTORY: Elevated d-dimer. Shortness of breath. Clinical finding is worrisome for pulmonary embolus Comparison: None Technique: CT evaluation of the chest was performed with intravenous contrast. 2 mm transaxial images were obtained. Maximum intensity projection reconstruction angiographic images were generated . Two-dimensional Coronal and sagittal reconstruction images of the pulmonary arteries, aorta and great vessels were also generated. .Radiation dose equals  mGy cm. AUTOMATED EXPOSURE CONTROL dose reduction technique was implemented Findings: There is image quadrant degradation related to breathing motion artifact. Pulmonary arteries opacify with iodinated contrast without intraluminal filling defects or convincing CT angiographic evidence of pulmonary embolus. The aorta is normal in caliber, without aneurysm or dissection. There is ectasia of the aortic arch with aortic calcifications. There is cardiomegaly with coronary artery calcifications. There are calcified left hilar lymph nodes. There is no mediastinal hilar lymphadenopathy. There are small bilateral pleural effusions with lower lobe airspace opacities, suspect atelectasis. There are emphysematous changes in the upper lobes with bleb formation. Evaluation of the lung fields challenging due to motion, however no definite consolidating parenchymal infiltrates identified. Thyroid gland enlargement with nodules. Left-sided nodule measures 2.3 cm.  Right-sided nodule measures 1.6 cm. There is no axillary lymph node enlargement. Limited imaging the upper abdomen demonstrate no acute abnormality. There is pectus excavatum anterior chest wall deformity. No acute osseous abnormalities observed. Impression: 1. No CT angiographic evidence of pulmonary embolus or acute aortic pathology. 2. Small bilateral pleural effusions with associated airspace opacities likely atelectasis. 3. Cardiomegaly with atherosclerotic calcifications. 4. Thyroid enlargement with nodules. 5. Pectus excavatum anterior chest wall deformity. Signed by Dr Adan Solano on 6/25/2020 4:26 PM      Labs:    CBC: No results for input(s): WBC, HGB, PLT in the last 72 hours. BMP:    Recent Labs     07/01/20  0132 07/02/20  1013 07/03/20  0223    142 142   K 3.6 3.9 3.7    108 106   CO2 21* 24 22   BUN 11 12 11   CREATININE 1.2 1.3* 1.3*   GLUCOSE 100 106 101     Hepatic:   Recent Labs     07/01/20  0132 07/03/20  0223   * 68*   * 73*   BILITOT 1.1 1.2   ALKPHOS 210* 186*     Troponin: No results for input(s): TROPONINI in the last 72 hours. BNP: No results for input(s): BNP in the last 72 hours. Lipids: No results for input(s): CHOL, HDL in the last 72 hours. Invalid input(s): LDLCALCU  INR: No results for input(s): INR in the last 72 hours. ABG: No results for input(s): PHART, AYI2PPE, PO2ART, TLU4QZK, E3ETDYYE, BEART in the last 72 hours. 30 Day lookback of cultures:    Blood Culture Recent:   Recent Labs     06/25/20  0908   BC No growth after 5 days of incubation. Gram Stain Recent: No results for input(s): LABGRAM in the last 720 hours. Resp Culture Recent: No results for input(s): CULTRESP in the last 720 hours. Body Fluid Recent : No results for input(s): BFCX in the last 720 hours. MRSA Recent : No results for input(s): [facility] in the last 720 hours.    Urine Culture Recent :   Recent Labs     06/25/20  1150   LABURIN No growth at 36-48 hours     Organism Recent : No results for input(s): ORG in the last 720 hours. Hospital Course:   male  whom we are following for acute kidney injury, febrile illness, syncope, symptomatic bradycardia. Overall he is doing much better. 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 His GFR is also improved.   He is working with physical therapy, trigeminal neuralgia flared up he is feeling better   6/30/20  PMH of dementia, HTN, Presented with syncope with bradycardia , Echo good EF, CTA -ve PE, bradycardia resolved, was found to have fever 105, UC BC -ve,COVID-19 -ve  noted to have leukopenia and thrombocytopenia ID on board Working diagnosis is tickborne most likely Ehrlichia, placed on doxy,renal consulted for NIDHI, glomerulonephritis, now Cr down to 1.3, noticed elevated LFTs  Check acute hepatitis panel -ve , advance diet check B12 ammonia TSH -ve all , per ID awaiting  CMV IgG and CMV IgM ,   6/29/20  PMH of dementia, HTN, Presented with syncope with bradycardia , Echo good EF, CTA -ve PE, bradycardia resolved, was found to have fever 105, UC BC -ve,COVID-19 -ve  noted to have leukopenia and thrombocytopenia ID on board Working diagnosis is tickborne most likely Ehrlichia, placed on doxy,renal consulted for NIDHI, glomerulonephritis, now Cr down to 1.6, noticed elevated LFTs  Check acute hepatitis panel, advance diet check B12 ammonia TSH -ve all   6/28/20  PMH of dementia, HTN, Presented with syncope with bradycardia , Echo good EF, CTA -ve PE, bradycardia resolved, was found to have fever 105, UC BC -ve,COVID-19 -ve  noted to have leukopenia and thrombocytopenia ID on board Working diagnosis is tickborne most likely Ehrlichia, placed on doxy,renal consulted for NIDHI, glomerulonephritis,  Check acute hepatitis panel, advance diet check B12 -ve  Ammonia-ve TSH -ve   Physical Exam:    Vitals: BP (!) 154/74   Pulse 59   Temp 97.3 °F (36.3 °C) (Temporal)   Resp 20   Ht 6' (1.829 m)   Wt 171 lb (77.6 kg)   SpO2 90%   BMI 23.19 kg/m²    General appearance: alert and cooperative with exam  HEENT: atraumatic, eyes with clear conjunctiva and normal lids  Lungs: no increased work of breathing, diminished breath sounds bilaterally  Heart: S1, S2 normal  Abdomen: soft, non-tender; bowel sounds normal; no masses,  no organomegaly  Extremities:atraumatic, no cyanosis no edema no calf tenderness   Neurologic:non focal    Skin: no rashes, nodules. Discharge Medications:       Josefina Yadav   Home Medication Instructions PYK:[id number]    Printed on:07/03/20 3087   Medication Information                      amLODIPine (NORVASC) 10 MG tablet  Take 1 tablet by mouth daily             aspirin 81 MG chewable tablet  Take 1 tablet by mouth daily             benazepril (LOTENSIN) 10 MG tablet  Take 10 mg by mouth 2 times daily             doxycycline monohydrate (MONODOX) 100 MG capsule  Take 1 capsule by mouth every 12 hours for 10 days             gabapentin (NEURONTIN) 300 MG capsule  Take 800 mg by mouth 2 times daily. HYDROcodone-acetaminophen (NORCO) 5-325 MG per tablet  Take 1 tablet by mouth every 6 hours as needed for Pain.             lamoTRIgine (LAMICTAL) 25 MG tablet  Take 25 mg by mouth daily             levothyroxine (SYNTHROID) 50 MCG tablet  Take 50 mcg by mouth Daily                 Discharge Instructions: Follow up with No primary care provider on file. in 7 days. Take medications as directed. Resume activity as tolerated. Diet: DIET CARDIAC; Disposition: Patient is medically stable and will be discharged home with [facility]     Time spent on discharge 45  minutes. Signed:   Wicho Degroot MD  Hospitalist service  7/3/2020  11:32 AM

## 2020-07-03 NOTE — PROGRESS NOTES
Ok to dc per nephrology with 1 week follow up. Spoke with Jalyn Lovett on call.   Electronically signed by Helena Babb RN on 7/3/2020 at 12:25 PM

## 2020-07-03 NOTE — PROGRESS NOTES
Patient and daughter instructed to remove lidocaine patch in 12 hours. Verbalized understanding and agreed to comply.    Electronically signed by Elisa Grant RN on 7/3/2020 at 1:59 PM

## 2020-07-03 NOTE — PROGRESS NOTES
07/03/20 1435   Restrictions/Precautions   Restrictions/Precautions Fall Risk   General   Chart Reviewed Yes   Subjective   Subjective Patient sitting EOB agrees to walk   Pain Screening   Patient Currently in Pain Denies   Oxygen Therapy   O2 Device None (Room air)   Transfers   Sit to Stand Contact guard assistance   Stand to sit Contact guard assistance   Ambulation 1   Surface level tile   Device Rolling Walker   Assistance Contact guard assistance   Quality of Gait Slightly unsteady but no LOB   Gait Deviations Slow Yazmin   Distance 40'   Comments Patient returned sitting EOB   Safety Devices   Type of devices Call light within reach   Physical Therapy    Electronically signed by Gatito Kirby PTA on 7/3/2020 at 2:39 PM

## 2020-07-03 NOTE — CARE COORDINATION
[facility] notified of patient discharge today. DC Summary and DC med list faxed.   Electronically signed by Marah Ahmadi RN on 7/3/20 at 2:20 PM CDT

## 2020-07-03 NOTE — PROGRESS NOTES
Nephrology ([facility]) Progress Note    Patient:  Richard Spears  YOB: 1940  Date of Service: 7/3/2020  MRN: [id number]   Acct: [de-identified]   Primary Care Physician: No primary care provider on file. Advance Directive: Full Code  Admit Date: 6/25/2020       Hospital Day: 8  Referring Provider: Aziza Corcoran MD    Patient independently seen and examined, Chart, Consults, Notes, Operative notes, Labs, Cardiology, and Radiology studies reviewed as able. Subjective: Max Rosalia Boxer is a [de-identified] y.o. male  whom we were consulted for acute kidney injury. Patient denied any history of chronic kidney disease. Presented to the emergency room as he was brought in by family to the [facility] ER with fever up to 105. In the emergency room he was found to have bradycardia, increasing creatinine, thrombocytopenia and leukopenia. He was transferred to [facility] for further evaluation of fever. Apparently he was confused and had encephalopathy which was resolving slowly. He has a history of hypertension, sleep apnea and trigeminal neuralgia. Nephrology is consulted as he has serum creatinine of 1.7. His urinalysis showed patient has proteinuria and hematuria. He was initially admitted to ICU now transferred back to regular floor.     Today, no overnight events. Family present. Mental status improved. No overnight events noted. Denies chest pain, shortness of air, nausea vomiting. Family with questions about Neurontin dosing, appears to have already been adjusted. Allergies:  Patient has no known allergies.     Medicines:  Current Facility-Administered Medications   Medication Dose Route Frequency Provider Last Rate Last Dose    amLODIPine (NORVASC) tablet 10 mg  10 mg Oral Daily Padmini Ganja, DO   10 mg at 07/03/20 7639    gabapentin (NEURONTIN) capsule 400 mg  400 mg Oral 4x Daily Padmini Ganja, DO   400 mg at 07/03/20 2430    doxycycline monohydrate (MONODOX) capsule 100 mg  100 mg Oral 2 times per day Lavelle Perkins MD   100 mg at 07/03/20 4697    potassium chloride (KLOR-CON M) extended release tablet 40 mEq  40 mEq Oral PRN Amy Schafer MD        Or    potassium bicarb-citric acid (EFFER-K) effervescent tablet 40 mEq  40 mEq Oral PRN Amy Schafer MD   40 mEq at 06/30/20 1002    Or    potassium chloride 10 mEq/100 mL IVPB (Peripheral Line)  10 mEq Intravenous PRN Amy Schafer MD        magnesium sulfate 1 g in dextrose 5% 100 mL IVPB  1 g Intravenous PRN Amy Schafer MD        acetaminophen (TYLENOL) tablet 650 mg  650 mg Oral Q4H PRN Lavelle Perkins MD   650 mg at 07/01/20 2340    melatonin tablet 3 mg  3 mg Oral Nightly Roselyn Shane MD   3 mg at 07/02/20 2300    hydrOXYzine (ATARAX) tablet 12.5 mg  12.5 mg Oral Q6H PRN Gabriele Hale APRDMITRI   12.5 mg at 07/01/20 2340    lisinopril (PRINIVIL;ZESTRIL) tablet 20 mg  20 mg Oral Daily Gail De Los Santos MD   20 mg at 07/03/20 0920    aspirin chewable tablet 81 mg  81 mg Oral Daily [facility], MD   81 mg at 07/03/20 0868    polyethylene glycol (GLYCOLAX) packet 17 g  17 g Oral Daily PRN [facility], MD        ondansetron (ZOFRAN-ODT) disintegrating tablet 4 mg  4 mg Oral Q8H PRN [facility], MD        Or    ondansetron [facility]) injection 4 mg  4 mg Intravenous Q6H PRN [facility], [facility]        nitroGLYCERIN (NITROSTAT) SL tablet 0.4 mg  0.4 mg Sublingual Q5 Min PRN [facility], MD        lamoTRIgine (LAMICTAL) tablet 25 mg  25 mg Oral Daily [facility], MD   25 mg at 07/03/20 0919    levothyroxine (SYNTHROID) tablet 50 mcg  50 mcg Oral Daily [facility], MD   50 mcg at 07/03/20 3446    sodium chloride flush 0.9 % injection 10 mL  10 mL Intravenous 2 times per day [facility], MD   10 mL at 07/03/20 0917    sodium chloride flush 0.9 % injection 10 mL  10 mL Intravenous PRN [facility], MD        ipratropium-albuterol (DUONEB) nebulizer solution 1 ampule  1 ampule Inhalation Q4H WA Carolyn Calderon MD   1 ampule at 07/03/20 1054    hydrALAZINE (APRESOLINE) injection 10 mg  10 mg Intravenous Q4H PRN Carolyn Calderon MD   10 mg at 06/27/20 0130    morphine injection 1 mg  1 mg Intravenous Q4H PRN Carolyn Calderon MD   1 mg at 06/26/20 0211    morphine injection 2 mg  2 mg Intravenous Q4H PRN Carolyn Calderon MD   2 mg at 06/27/20 0208    naloxone (NARCAN) injection 0.4 mg  0.4 mg Intravenous PRN Carolyn Calderon MD           Past Medical History:  Past Medical History:   Diagnosis Date    Hypertension     Palliative care patient 06/26/2020    Sleep apnea     Thyroid disease     Trigeminal neuropathy        Past Surgical History:  Past Surgical History:   Procedure Laterality Date    CHOLECYSTECTOMY      HERNIA REPAIR         Family History  No family history on file.     Social History  Social History     Socioeconomic History    Marital status:      Spouse name: Whit Marquez    Number of children: 2    Years of education: Not on file    Highest education level: Not on file   Occupational History    Not on file   Social Needs    Financial resource strain: Not on file    Food insecurity     Worry: Not on file     Inability: Not on file    Transportation needs     Medical: Not on file     Non-medical: Not on file   Tobacco Use    Smoking status: Former Smoker     Types: Cigarettes    Smokeless tobacco: Never Used   Substance and Sexual Activity    Alcohol use: Not on file    Drug use: Never    Sexual activity: Not Currently     Partners: Male   Lifestyle    Physical activity     Days per week: Not on file     Minutes per session: Not on file    Stress: Not on file   Relationships    Social connections     Talks on phone: Not on file     Gets together: Not on file     Attends Zoroastrian service: Not on file     Active member of club or organization: Not on file     Attends meetings of clubs or organizations: Not on file     Relationship status: Not on file   Jesus Santos the last 72 hours. BNP:  No results for input(s): BNP in the last 72 hours. Ionized Calcium:No results for input(s): IONCA in the last 72 hours. Magnesium:  Recent Labs     07/03/20  0223   MG 1.9     Phosphorus:  No results for input(s): PHOS in the last 72 hours. HgbA1C: No results for input(s): LABA1C in the last 72 hours. Hepatic:   Recent Labs     07/01/20  0132 07/03/20  0223   ALKPHOS 210* 186*   * 73*   * 68*   PROT 5.1* 5.6*   BILITOT 1.1 1.2   BILIDIR  --  0.6*   LABALBU 3.1* 3.4*     Lactic Acid: No results for input(s): LACTA in the last 72 hours. Troponin: No results for input(s): CKTOTAL, CKMB, TROPONINT in the last 72 hours. ABGs:   Lab Results   Component Value Date    PHART 7.460 06/25/2020    PO2ART 65.0 06/25/2020    HVM0BJU 25.0 06/25/2020     CRP:  No results for input(s): CRP in the last 72 hours. Sed Rate:  No results for input(s): SEDRATE in the last 72 hours. Culture Results:   Blood Culture Recent:   Recent Labs     06/25/20  0908   BC No growth after 5 days of incubation. Urine Culture Recent :   Recent Labs     06/25/20  1150   LABURIN No growth at 36-48 hours       Radiology reports as per the Radiologist  Radiology: Ct Head Wo Contrast    Result Date: 6/25/2020  EXAM: CT HEAD WO CONTRAST -- 6/25/2020 2:15 PM HISTORY: 79 years, Male, confusion COMPARISON: None DLP: 805 mGy cm. Automated exposure control was utilized to minimize patient radiation dose. TECHNIQUE: Unenhanced axial CT images obtained from vertex to skull base with coronal and sagittal reformats. FINDINGS: No acute intracranial hemorrhage, midline shift, mass effect or large territory cortical infarct. Confluent periventricular and subcortical white matter hypodensities, most likely due to chronic microvascular disease. Ventricles, sulci, basilar cisterns are proportionally prominent suggesting volume loss. Thinning of the ocular lenses may be postoperative or age-related.  Paranasal sinuses and mastoid air cells normally aerated. External auditory canals within normal limits. Calvarium appears intact. Subcutaneous tissues within normal limits. 1. No acute intracranial hemorrhage, midline shift or mass effect. 2. Confluent periventricular and subcortical white matter hypodensities with volume loss, most commonly chronic microvascular changes. If clinical concern for acute infarct, MRI would be a more sensitive exam. Signed by Dr Chris Wilkerson on 6/25/2020 4:22 PM    Us Renal Complete    Result Date: 6/27/2020  US RENAL COMPLETE - 6/27/2020 9:30 AM Indication: Acute kidney injury Comparison: None available. Findings: RIGHT kidney measures 9 cm in length. RIGHT renal cortical thickness and echogenicity are within normal limits. No shadowing calculi or hydronephrosis. LEFT kidney measures 9.5 cm in length. LEFT renal cortical thickness and echogenicity are within normal limits. 2.1 cm cyst in the LEFT kidney upper pole without evidence of internal vascularity or complex features. No urolithiasis or hydronephrosis. Urinary bladder is decompressed by Rojas catheter. 1.  Negative for hydronephrosis or renal atrophy. 2.  2.1 cm cyst in the LEFT kidney without complex features. Signed by Dr Antonietta Linton on 6/27/2020 12:05 PM    Xr Chest Portable    Result Date: 6/25/2020  EXAMINATION:  XR CHEST PORTABLE  6/25/2020 1:12 PM HISTORY: Shortness of breath. COMPARISON: No comparison study. FINDINGS:  There are patchy infiltrates in the perihilar regions and lung bases. There is minimal blunting of the costophrenic angles. Heart size is upper limits of normal. Thoracic aorta is atheromatous. 1. Bilateral infiltrates likely representing pulmonary edema. Pneumonia is possible. 2. There may be small pleural effusions.  Heart size is upper limits of normal. Signed by Dr Claude Inman on 6/25/2020 1:23 PM    Vl Dup Lower Extremity Venous Bilateral    Result Date: 6/25/2020  Vascular Lower Extremities DVT Study for pulmonary embolus Comparison: None Technique: CT evaluation of the chest was performed with intravenous contrast. 2 mm transaxial images were obtained. Maximum intensity projection reconstruction angiographic images were generated . Two-dimensional Coronal and sagittal reconstruction images of the pulmonary arteries, aorta and great vessels were also generated. .Radiation dose equals  mGy cm. AUTOMATED EXPOSURE CONTROL dose reduction technique was implemented Findings: There is image quadrant degradation related to breathing motion artifact. Pulmonary arteries opacify with iodinated contrast without intraluminal filling defects or convincing CT angiographic evidence of pulmonary embolus. The aorta is normal in caliber, without aneurysm or dissection. There is ectasia of the aortic arch with aortic calcifications. There is cardiomegaly with coronary artery calcifications. There are calcified left hilar lymph nodes. There is no mediastinal hilar lymphadenopathy. There are small bilateral pleural effusions with lower lobe airspace opacities, suspect atelectasis. There are emphysematous changes in the upper lobes with bleb formation. Evaluation of the lung fields challenging due to motion, however no definite consolidating parenchymal infiltrates identified. Thyroid gland enlargement with nodules. Left-sided nodule measures 2.3 cm. Right-sided nodule measures 1.6 cm. There is no axillary lymph node enlargement. Limited imaging the upper abdomen demonstrate no acute abnormality. There is pectus excavatum anterior chest wall deformity. No acute osseous abnormalities observed. Impression: 1. No CT angiographic evidence of pulmonary embolus or acute aortic pathology. 2. Small bilateral pleural effusions with associated airspace opacities likely atelectasis. 3. Cardiomegaly with atherosclerotic calcifications. 4. Thyroid enlargement with nodules. 5. Pectus excavatum anterior chest wall deformity.  Signed by  Reymundo West on 6/25/2020 4:26 PM       Assessment   Acute kidney injury-uncertain baseline  Possible ehrlichiosis  Metabolic acidosis  Hypertension  Leukopenia/neutropenia  Thrombocytopenia  Elevated transaminases  Microscopic hematuria    Plan:  Antibiotics per ID service  Monitor blood pressure trends  Monitor labs  continue IV fluids  Repeat urinalysis ok  Replace K again as needed, mag was adequate, recheck level also ok  Discussed with patient, family, nursing  Reviewed Neurontin dosing and renal function with patient and family, appears to already be at previsit dosing    Alida Persaud MD  07/03/20  10:58 AM

## 2020-07-04 ENCOUNTER — OUTSIDE FACILITY SERVICE (OUTPATIENT)
Dept: FAMILY MEDICINE CLINIC | Facility: CLINIC | Age: 80
End: 2020-07-04

## 2020-07-07 DIAGNOSIS — G50.0 TRIGEMINAL NEURALGIA: ICD-10-CM

## 2020-07-09 RX ORDER — GABAPENTIN 800 MG/1
800 TABLET ORAL 3 TIMES DAILY
Qty: 270 TABLET | Refills: 1 | Status: SHIPPED | OUTPATIENT
Start: 2020-07-09 | End: 2021-01-04 | Stop reason: SDUPTHER

## 2020-07-13 PROCEDURE — G0180 MD CERTIFICATION HHA PATIENT: HCPCS | Performed by: FAMILY MEDICINE

## 2020-07-14 ENCOUNTER — OFFICE VISIT (OUTPATIENT)
Dept: FAMILY MEDICINE CLINIC | Facility: CLINIC | Age: 80
End: 2020-07-14

## 2020-07-14 VITALS
BODY MASS INDEX: 23.94 KG/M2 | DIASTOLIC BLOOD PRESSURE: 68 MMHG | OXYGEN SATURATION: 97 % | TEMPERATURE: 97.8 F | SYSTOLIC BLOOD PRESSURE: 130 MMHG | HEIGHT: 71 IN | HEART RATE: 84 BPM | WEIGHT: 171 LBS

## 2020-07-14 DIAGNOSIS — F03.91 DEMENTIA WITH BEHAVIORAL DISTURBANCE, UNSPECIFIED DEMENTIA TYPE: ICD-10-CM

## 2020-07-14 DIAGNOSIS — G50.0 TRIGEMINAL NEURALGIA: ICD-10-CM

## 2020-07-14 DIAGNOSIS — A77.40 EHRLICHIOSIS: Primary | ICD-10-CM

## 2020-07-14 DIAGNOSIS — M17.10 KNEE ARTHROPATHY: ICD-10-CM

## 2020-07-14 DIAGNOSIS — N18.30 CKD (CHRONIC KIDNEY DISEASE) STAGE 3, GFR 30-59 ML/MIN (HCC): ICD-10-CM

## 2020-07-14 DIAGNOSIS — I10 ESSENTIAL HYPERTENSION: ICD-10-CM

## 2020-07-14 PROBLEM — R41.82 ALTERED MENTAL STATUS: Status: ACTIVE | Noted: 2020-07-14

## 2020-07-14 PROBLEM — Z51.5 PALLIATIVE CARE PATIENT: Status: ACTIVE | Noted: 2020-06-26

## 2020-07-14 PROBLEM — R00.1 SYMPTOMATIC BRADYCARDIA: Status: ACTIVE | Noted: 2020-06-25

## 2020-07-14 PROBLEM — N17.9 AKI (ACUTE KIDNEY INJURY): Status: ACTIVE | Noted: 2020-07-14

## 2020-07-14 PROBLEM — R50.9 FEVER, UNSPECIFIED: Status: ACTIVE | Noted: 2020-07-14

## 2020-07-14 PROCEDURE — 96372 THER/PROPH/DIAG INJ SC/IM: CPT | Performed by: FAMILY MEDICINE

## 2020-07-14 PROCEDURE — 99495 TRANSJ CARE MGMT MOD F2F 14D: CPT | Performed by: FAMILY MEDICINE

## 2020-07-14 RX ORDER — ASPIRIN 81 MG/1
81 TABLET, CHEWABLE ORAL DAILY
COMMUNITY
Start: 2020-07-04

## 2020-07-14 RX ORDER — METHYLPREDNISOLONE ACETATE 40 MG/ML
40 INJECTION, SUSPENSION INTRA-ARTICULAR; INTRALESIONAL; INTRAMUSCULAR; SOFT TISSUE ONCE
Status: COMPLETED | OUTPATIENT
Start: 2020-07-14 | End: 2020-07-14

## 2020-07-14 RX ORDER — LAMOTRIGINE 200 MG/1
100 TABLET ORAL 2 TIMES DAILY
Qty: 60 TABLET | Refills: 2 | Status: SHIPPED | OUTPATIENT
Start: 2020-07-14 | End: 2020-07-15 | Stop reason: SDUPTHER

## 2020-07-14 RX ORDER — AMLODIPINE BESYLATE 10 MG/1
10 TABLET ORAL
COMMUNITY
Start: 2020-07-03 | End: 2020-07-14 | Stop reason: ALTCHOICE

## 2020-07-14 RX ADMIN — METHYLPREDNISOLONE ACETATE 40 MG: 40 INJECTION, SUSPENSION INTRA-ARTICULAR; INTRALESIONAL; INTRAMUSCULAR; SOFT TISSUE at 15:09

## 2020-07-15 ENCOUNTER — TELEPHONE (OUTPATIENT)
Dept: FAMILY MEDICINE CLINIC | Facility: CLINIC | Age: 80
End: 2020-07-15

## 2020-07-15 DIAGNOSIS — G50.0 TRIGEMINAL NEURALGIA: ICD-10-CM

## 2020-07-15 RX ORDER — LAMOTRIGINE 200 MG/1
100 TABLET ORAL 2 TIMES DAILY
Qty: 60 TABLET | Refills: 2 | Status: SHIPPED | OUTPATIENT
Start: 2020-07-15 | End: 2021-01-04 | Stop reason: SDUPTHER

## 2020-08-05 ENCOUNTER — OFFICE VISIT (OUTPATIENT)
Dept: FAMILY MEDICINE CLINIC | Facility: CLINIC | Age: 80
End: 2020-08-05

## 2020-08-05 VITALS
BODY MASS INDEX: 23.83 KG/M2 | DIASTOLIC BLOOD PRESSURE: 70 MMHG | SYSTOLIC BLOOD PRESSURE: 130 MMHG | TEMPERATURE: 97.4 F | HEART RATE: 62 BPM | WEIGHT: 170.2 LBS | HEIGHT: 71 IN

## 2020-08-05 DIAGNOSIS — F03.91 DEMENTIA WITH BEHAVIORAL DISTURBANCE, UNSPECIFIED DEMENTIA TYPE: ICD-10-CM

## 2020-08-05 DIAGNOSIS — M25.50 ARTHRALGIA, UNSPECIFIED JOINT: ICD-10-CM

## 2020-08-05 DIAGNOSIS — N18.30 CKD (CHRONIC KIDNEY DISEASE) STAGE 3, GFR 30-59 ML/MIN (HCC): ICD-10-CM

## 2020-08-05 DIAGNOSIS — M79.10 MYALGIA: ICD-10-CM

## 2020-08-05 DIAGNOSIS — R25.1 OCCASIONAL TREMORS: ICD-10-CM

## 2020-08-05 DIAGNOSIS — I10 ESSENTIAL HYPERTENSION: Primary | ICD-10-CM

## 2020-08-05 PROCEDURE — 99214 OFFICE O/P EST MOD 30 MIN: CPT | Performed by: FAMILY MEDICINE

## 2020-08-21 ENCOUNTER — OFFICE VISIT (OUTPATIENT)
Dept: FAMILY MEDICINE CLINIC | Facility: CLINIC | Age: 80
End: 2020-08-21

## 2020-08-21 VITALS
HEART RATE: 81 BPM | HEIGHT: 71 IN | TEMPERATURE: 97.1 F | OXYGEN SATURATION: 96 % | WEIGHT: 172 LBS | SYSTOLIC BLOOD PRESSURE: 114 MMHG | BODY MASS INDEX: 24.08 KG/M2 | DIASTOLIC BLOOD PRESSURE: 58 MMHG

## 2020-08-21 DIAGNOSIS — H61.23 BILATERAL IMPACTED CERUMEN: ICD-10-CM

## 2020-08-21 PROCEDURE — 99212 OFFICE O/P EST SF 10 MIN: CPT | Performed by: FAMILY MEDICINE

## 2020-08-21 PROCEDURE — 69210 REMOVE IMPACTED EAR WAX UNI: CPT | Performed by: FAMILY MEDICINE

## 2020-09-11 ENCOUNTER — OFFICE VISIT (OUTPATIENT)
Dept: FAMILY MEDICINE CLINIC | Facility: CLINIC | Age: 80
End: 2020-09-11

## 2020-09-11 VITALS
SYSTOLIC BLOOD PRESSURE: 136 MMHG | HEIGHT: 71 IN | BODY MASS INDEX: 23.85 KG/M2 | DIASTOLIC BLOOD PRESSURE: 68 MMHG | OXYGEN SATURATION: 97 % | TEMPERATURE: 97.5 F | HEART RATE: 59 BPM | WEIGHT: 170.4 LBS

## 2020-09-11 DIAGNOSIS — H92.03 ACUTE OTALGIA, BILATERAL: ICD-10-CM

## 2020-09-11 DIAGNOSIS — Z23 FLU VACCINE NEED: ICD-10-CM

## 2020-09-11 DIAGNOSIS — H61.23 BILATERAL IMPACTED CERUMEN: Primary | ICD-10-CM

## 2020-09-11 PROCEDURE — G0008 ADMIN INFLUENZA VIRUS VAC: HCPCS | Performed by: NURSE PRACTITIONER

## 2020-09-11 PROCEDURE — 90653 IIV ADJUVANT VACCINE IM: CPT | Performed by: NURSE PRACTITIONER

## 2020-09-11 PROCEDURE — 69210 REMOVE IMPACTED EAR WAX UNI: CPT | Performed by: NURSE PRACTITIONER

## 2020-09-11 PROCEDURE — 99213 OFFICE O/P EST LOW 20 MIN: CPT | Performed by: NURSE PRACTITIONER

## 2020-10-02 ENCOUNTER — OFFICE VISIT (OUTPATIENT)
Dept: FAMILY MEDICINE CLINIC | Facility: CLINIC | Age: 80
End: 2020-10-02

## 2020-10-02 VITALS
DIASTOLIC BLOOD PRESSURE: 70 MMHG | HEART RATE: 62 BPM | SYSTOLIC BLOOD PRESSURE: 140 MMHG | HEIGHT: 71 IN | OXYGEN SATURATION: 98 % | WEIGHT: 173 LBS | TEMPERATURE: 96.2 F | BODY MASS INDEX: 24.22 KG/M2

## 2020-10-02 DIAGNOSIS — G50.0 TRIGEMINAL NEURALGIA: Primary | ICD-10-CM

## 2020-10-02 DIAGNOSIS — M17.11 PRIMARY OSTEOARTHRITIS OF RIGHT KNEE: ICD-10-CM

## 2020-10-02 DIAGNOSIS — N18.32 STAGE 3B CHRONIC KIDNEY DISEASE (HCC): ICD-10-CM

## 2020-10-02 DIAGNOSIS — I10 ESSENTIAL HYPERTENSION: ICD-10-CM

## 2020-10-02 PROCEDURE — 99213 OFFICE O/P EST LOW 20 MIN: CPT | Performed by: FAMILY MEDICINE

## 2020-10-05 RX ORDER — LEVOTHYROXINE SODIUM 0.05 MG/1
TABLET ORAL
Qty: 90 TABLET | Refills: 0 | Status: SHIPPED | OUTPATIENT
Start: 2020-10-05 | End: 2021-01-26

## 2020-10-05 RX ORDER — BENAZEPRIL HYDROCHLORIDE 10 MG/1
TABLET ORAL
Qty: 60 TABLET | Refills: 2 | Status: SHIPPED | OUTPATIENT
Start: 2020-10-05 | End: 2021-02-02 | Stop reason: SDUPTHER

## 2020-10-07 ENCOUNTER — TREATMENT (OUTPATIENT)
Dept: PHYSICAL THERAPY | Facility: CLINIC | Age: 80
End: 2020-10-07

## 2020-10-07 DIAGNOSIS — M17.11 PRIMARY OSTEOARTHRITIS OF RIGHT KNEE: Primary | ICD-10-CM

## 2020-10-07 PROCEDURE — 97110 THERAPEUTIC EXERCISES: CPT | Performed by: PHYSICAL THERAPIST

## 2020-10-07 PROCEDURE — 97161 PT EVAL LOW COMPLEX 20 MIN: CPT | Performed by: PHYSICAL THERAPIST

## 2021-01-04 ENCOUNTER — OFFICE VISIT (OUTPATIENT)
Dept: FAMILY MEDICINE CLINIC | Facility: CLINIC | Age: 81
End: 2021-01-04

## 2021-01-04 VITALS
HEIGHT: 71 IN | DIASTOLIC BLOOD PRESSURE: 73 MMHG | HEART RATE: 72 BPM | TEMPERATURE: 97.8 F | SYSTOLIC BLOOD PRESSURE: 131 MMHG | OXYGEN SATURATION: 98 % | BODY MASS INDEX: 24.44 KG/M2 | WEIGHT: 174.6 LBS

## 2021-01-04 DIAGNOSIS — N18.32 STAGE 3B CHRONIC KIDNEY DISEASE (HCC): ICD-10-CM

## 2021-01-04 DIAGNOSIS — I10 ESSENTIAL HYPERTENSION: ICD-10-CM

## 2021-01-04 DIAGNOSIS — G50.0 TRIGEMINAL NEURALGIA: Primary | ICD-10-CM

## 2021-01-04 DIAGNOSIS — F03.91 DEMENTIA WITH BEHAVIORAL DISTURBANCE, UNSPECIFIED DEMENTIA TYPE: ICD-10-CM

## 2021-01-04 DIAGNOSIS — Z79.899 ENCOUNTER FOR LONG-TERM (CURRENT) USE OF MEDICATIONS: ICD-10-CM

## 2021-01-04 PROCEDURE — 99214 OFFICE O/P EST MOD 30 MIN: CPT | Performed by: FAMILY MEDICINE

## 2021-01-04 RX ORDER — GABAPENTIN 800 MG/1
800 TABLET ORAL 3 TIMES DAILY
Qty: 270 TABLET | Refills: 0 | Status: SHIPPED | OUTPATIENT
Start: 2021-01-04 | End: 2021-04-02 | Stop reason: SDUPTHER

## 2021-01-04 RX ORDER — LAMOTRIGINE 200 MG/1
100 TABLET ORAL 2 TIMES DAILY
Qty: 60 TABLET | Refills: 2 | Status: SHIPPED | OUTPATIENT
Start: 2021-01-04 | End: 2021-07-24

## 2021-01-05 ENCOUNTER — CLINICAL SUPPORT (OUTPATIENT)
Dept: FAMILY MEDICINE CLINIC | Facility: CLINIC | Age: 81
End: 2021-01-05

## 2021-01-10 LAB — DRUGS UR: NORMAL

## 2021-01-26 RX ORDER — LEVOTHYROXINE SODIUM 0.05 MG/1
TABLET ORAL
Qty: 90 TABLET | Refills: 1 | Status: SHIPPED | OUTPATIENT
Start: 2021-01-26 | End: 2021-07-24

## 2021-02-02 ENCOUNTER — TELEPHONE (OUTPATIENT)
Dept: FAMILY MEDICINE CLINIC | Facility: CLINIC | Age: 81
End: 2021-02-02

## 2021-02-02 DIAGNOSIS — I10 ESSENTIAL HYPERTENSION: Primary | ICD-10-CM

## 2021-02-02 RX ORDER — BENAZEPRIL HYDROCHLORIDE 10 MG/1
10 TABLET ORAL 2 TIMES DAILY
Qty: 180 TABLET | Refills: 0 | Status: SHIPPED | OUTPATIENT
Start: 2021-02-02 | End: 2021-04-27

## 2021-04-02 ENCOUNTER — OFFICE VISIT (OUTPATIENT)
Dept: FAMILY MEDICINE CLINIC | Facility: CLINIC | Age: 81
End: 2021-04-02

## 2021-04-02 VITALS
WEIGHT: 171.2 LBS | HEIGHT: 71 IN | DIASTOLIC BLOOD PRESSURE: 71 MMHG | BODY MASS INDEX: 23.97 KG/M2 | OXYGEN SATURATION: 98 % | TEMPERATURE: 97.4 F | HEART RATE: 52 BPM | SYSTOLIC BLOOD PRESSURE: 140 MMHG

## 2021-04-02 DIAGNOSIS — H61.23 BILATERAL IMPACTED CERUMEN: ICD-10-CM

## 2021-04-02 DIAGNOSIS — I10 ESSENTIAL HYPERTENSION: ICD-10-CM

## 2021-04-02 DIAGNOSIS — G50.0 TRIGEMINAL NEURALGIA OF RIGHT SIDE OF FACE: ICD-10-CM

## 2021-04-02 DIAGNOSIS — G50.0 TRIGEMINAL NEURALGIA: Primary | ICD-10-CM

## 2021-04-02 PROCEDURE — 99214 OFFICE O/P EST MOD 30 MIN: CPT | Performed by: FAMILY MEDICINE

## 2021-04-02 RX ORDER — GABAPENTIN 800 MG/1
800 TABLET ORAL 3 TIMES DAILY
Qty: 270 TABLET | Refills: 0 | Status: SHIPPED | OUTPATIENT
Start: 2021-04-02 | End: 2021-07-24

## 2021-04-09 ENCOUNTER — OFFICE VISIT (OUTPATIENT)
Dept: FAMILY MEDICINE CLINIC | Facility: CLINIC | Age: 81
End: 2021-04-09

## 2021-04-09 VITALS
DIASTOLIC BLOOD PRESSURE: 88 MMHG | BODY MASS INDEX: 24.06 KG/M2 | HEART RATE: 84 BPM | SYSTOLIC BLOOD PRESSURE: 150 MMHG | WEIGHT: 172.4 LBS | OXYGEN SATURATION: 97 % | TEMPERATURE: 97.5 F

## 2021-04-09 DIAGNOSIS — H61.23 EXCESSIVE CERUMEN IN BOTH EAR CANALS: ICD-10-CM

## 2021-04-09 DIAGNOSIS — I10 ESSENTIAL HYPERTENSION: Primary | ICD-10-CM

## 2021-04-09 DIAGNOSIS — G50.0 TRIGEMINAL NEURALGIA: ICD-10-CM

## 2021-04-09 PROCEDURE — 99212 OFFICE O/P EST SF 10 MIN: CPT | Performed by: FAMILY MEDICINE

## 2021-04-09 PROCEDURE — 69210 REMOVE IMPACTED EAR WAX UNI: CPT | Performed by: FAMILY MEDICINE

## 2021-04-26 DIAGNOSIS — I10 ESSENTIAL HYPERTENSION: ICD-10-CM

## 2021-04-27 RX ORDER — BENAZEPRIL HYDROCHLORIDE 10 MG/1
TABLET ORAL
Qty: 180 TABLET | Refills: 0 | Status: SHIPPED | OUTPATIENT
Start: 2021-04-27 | End: 2021-07-24

## 2021-07-23 DIAGNOSIS — I10 ESSENTIAL HYPERTENSION: ICD-10-CM

## 2021-07-23 DIAGNOSIS — G50.0 TRIGEMINAL NEURALGIA: ICD-10-CM

## 2021-07-24 RX ORDER — LAMOTRIGINE 200 MG/1
TABLET ORAL
Qty: 60 TABLET | Refills: 2 | Status: SHIPPED | OUTPATIENT
Start: 2021-07-24 | End: 2021-09-20 | Stop reason: SDUPTHER

## 2021-07-24 RX ORDER — GABAPENTIN 800 MG/1
TABLET ORAL
Qty: 270 TABLET | Refills: 0 | Status: SHIPPED | OUTPATIENT
Start: 2021-07-24 | End: 2022-01-03

## 2021-07-24 RX ORDER — BENAZEPRIL HYDROCHLORIDE 10 MG/1
TABLET ORAL
Qty: 180 TABLET | Refills: 0 | Status: SHIPPED | OUTPATIENT
Start: 2021-07-24 | End: 2021-10-22

## 2021-07-24 RX ORDER — LEVOTHYROXINE SODIUM 0.05 MG/1
TABLET ORAL
Qty: 90 TABLET | Refills: 1 | Status: SHIPPED | OUTPATIENT
Start: 2021-07-24 | End: 2022-01-03

## 2021-09-20 ENCOUNTER — OFFICE VISIT (OUTPATIENT)
Dept: FAMILY MEDICINE CLINIC | Facility: CLINIC | Age: 81
End: 2021-09-20

## 2021-09-20 VITALS
WEIGHT: 172.8 LBS | SYSTOLIC BLOOD PRESSURE: 117 MMHG | DIASTOLIC BLOOD PRESSURE: 74 MMHG | OXYGEN SATURATION: 96 % | HEART RATE: 60 BPM | HEIGHT: 71 IN | TEMPERATURE: 98 F | BODY MASS INDEX: 24.19 KG/M2

## 2021-09-20 DIAGNOSIS — G50.0 TRIGEMINAL NEURALGIA: ICD-10-CM

## 2021-09-20 DIAGNOSIS — I10 ESSENTIAL HYPERTENSION: ICD-10-CM

## 2021-09-20 DIAGNOSIS — M25.561 CHRONIC PATELLOFEMORAL PAIN OF RIGHT KNEE: Primary | ICD-10-CM

## 2021-09-20 DIAGNOSIS — G89.29 CHRONIC PATELLOFEMORAL PAIN OF RIGHT KNEE: Primary | ICD-10-CM

## 2021-09-20 PROCEDURE — 99214 OFFICE O/P EST MOD 30 MIN: CPT | Performed by: FAMILY MEDICINE

## 2021-09-20 RX ORDER — LAMOTRIGINE 200 MG/1
100 TABLET ORAL 2 TIMES DAILY
Qty: 180 TABLET | Refills: 1 | Status: SHIPPED | OUTPATIENT
Start: 2021-09-20 | End: 2021-12-13 | Stop reason: SDUPTHER

## 2021-10-21 DIAGNOSIS — I10 ESSENTIAL HYPERTENSION: ICD-10-CM

## 2021-10-22 RX ORDER — BENAZEPRIL HYDROCHLORIDE 10 MG/1
TABLET ORAL
Qty: 180 TABLET | Refills: 0 | Status: SHIPPED | OUTPATIENT
Start: 2021-10-22 | End: 2022-01-18

## 2021-10-28 ENCOUNTER — TELEPHONE (OUTPATIENT)
Dept: FAMILY MEDICINE CLINIC | Facility: CLINIC | Age: 81
End: 2021-10-28

## 2021-11-04 DIAGNOSIS — M25.561 CHRONIC PATELLOFEMORAL PAIN OF RIGHT KNEE: Primary | ICD-10-CM

## 2021-11-04 DIAGNOSIS — G89.29 CHRONIC PATELLOFEMORAL PAIN OF RIGHT KNEE: Primary | ICD-10-CM

## 2021-12-01 DIAGNOSIS — M25.561 CHRONIC PATELLOFEMORAL PAIN OF RIGHT KNEE: ICD-10-CM

## 2021-12-01 DIAGNOSIS — G89.29 CHRONIC PATELLOFEMORAL PAIN OF RIGHT KNEE: ICD-10-CM

## 2021-12-13 DIAGNOSIS — G50.0 TRIGEMINAL NEURALGIA: ICD-10-CM

## 2021-12-13 RX ORDER — LAMOTRIGINE 200 MG/1
100 TABLET ORAL 2 TIMES DAILY
Qty: 180 TABLET | Refills: 1 | Status: SHIPPED | OUTPATIENT
Start: 2021-12-13 | End: 2021-12-29 | Stop reason: SDUPTHER

## 2021-12-29 ENCOUNTER — OFFICE VISIT (OUTPATIENT)
Dept: FAMILY MEDICINE CLINIC | Facility: CLINIC | Age: 81
End: 2021-12-29

## 2021-12-29 VITALS
SYSTOLIC BLOOD PRESSURE: 162 MMHG | WEIGHT: 173 LBS | BODY MASS INDEX: 23.43 KG/M2 | TEMPERATURE: 97.5 F | DIASTOLIC BLOOD PRESSURE: 79 MMHG | HEIGHT: 72 IN | HEART RATE: 52 BPM | OXYGEN SATURATION: 97 %

## 2021-12-29 DIAGNOSIS — N43.3 LEFT HYDROCELE: ICD-10-CM

## 2021-12-29 DIAGNOSIS — G50.0 TRIGEMINAL NEURALGIA: ICD-10-CM

## 2021-12-29 DIAGNOSIS — I10 ESSENTIAL HYPERTENSION: Primary | ICD-10-CM

## 2021-12-29 PROCEDURE — 99214 OFFICE O/P EST MOD 30 MIN: CPT | Performed by: FAMILY MEDICINE

## 2021-12-29 RX ORDER — LAMOTRIGINE 200 MG/1
200 TABLET ORAL 2 TIMES DAILY
Qty: 180 TABLET | Refills: 1 | Status: SHIPPED | OUTPATIENT
Start: 2021-12-29

## 2022-01-03 DIAGNOSIS — G50.0 TRIGEMINAL NEURALGIA: ICD-10-CM

## 2022-01-03 RX ORDER — GABAPENTIN 800 MG/1
TABLET ORAL
Qty: 270 TABLET | Refills: 0 | Status: SHIPPED | OUTPATIENT
Start: 2022-01-03 | End: 2022-04-20

## 2022-01-03 RX ORDER — LEVOTHYROXINE SODIUM 0.05 MG/1
TABLET ORAL
Qty: 90 TABLET | Refills: 1 | Status: SHIPPED | OUTPATIENT
Start: 2022-01-03

## 2022-01-17 DIAGNOSIS — I10 ESSENTIAL HYPERTENSION: ICD-10-CM

## 2022-01-18 RX ORDER — BENAZEPRIL HYDROCHLORIDE 10 MG/1
TABLET ORAL
Qty: 180 TABLET | Refills: 1 | Status: SHIPPED | OUTPATIENT
Start: 2022-01-18 | End: 2022-01-21

## 2022-01-21 DIAGNOSIS — I10 ESSENTIAL HYPERTENSION: ICD-10-CM

## 2022-01-21 RX ORDER — BENAZEPRIL HYDROCHLORIDE 10 MG/1
TABLET ORAL
Qty: 180 TABLET | Refills: 0 | Status: SHIPPED | OUTPATIENT
Start: 2022-01-21

## 2022-04-20 DIAGNOSIS — G50.0 TRIGEMINAL NEURALGIA: ICD-10-CM

## 2022-04-20 RX ORDER — GABAPENTIN 800 MG/1
TABLET ORAL
Qty: 270 TABLET | Refills: 0 | Status: SHIPPED | OUTPATIENT
Start: 2022-04-20

## 2022-11-17 ENCOUNTER — TELEPHONE (OUTPATIENT)
Dept: FAMILY MEDICINE CLINIC | Facility: CLINIC | Age: 82
End: 2022-11-17

## 2023-08-02 ENCOUNTER — EXTERNAL PBMM DATA (OUTPATIENT)
Dept: PHARMACY | Facility: OTHER | Age: 83
End: 2023-08-02
Payer: MEDICARE

## 2025-02-22 ENCOUNTER — APPOINTMENT (OUTPATIENT)
Dept: CT IMAGING | Age: 85
DRG: 683 | End: 2025-02-22
Payer: MEDICARE

## 2025-02-22 ENCOUNTER — APPOINTMENT (OUTPATIENT)
Dept: GENERAL RADIOLOGY | Age: 85
DRG: 683 | End: 2025-02-22
Payer: MEDICARE

## 2025-02-22 ENCOUNTER — HOSPITAL ENCOUNTER (INPATIENT)
Age: 85
LOS: 1 days | Discharge: HOME HEALTH | DRG: 683 | End: 2025-02-24
Attending: STUDENT IN AN ORGANIZED HEALTH CARE EDUCATION/TRAINING PROGRAM | Admitting: STUDENT IN AN ORGANIZED HEALTH CARE EDUCATION/TRAINING PROGRAM
Payer: MEDICARE

## 2025-02-22 DIAGNOSIS — N17.9 AKI (ACUTE KIDNEY INJURY): ICD-10-CM

## 2025-02-22 DIAGNOSIS — R41.82 ALTERED MENTAL STATUS, UNSPECIFIED ALTERED MENTAL STATUS TYPE: Primary | ICD-10-CM

## 2025-02-22 DIAGNOSIS — I63.9 CEREBROVASCULAR ACCIDENT (CVA), UNSPECIFIED MECHANISM (HCC): ICD-10-CM

## 2025-02-22 PROBLEM — E78.5 HYPERLIPIDEMIA: Status: ACTIVE | Noted: 2025-02-22

## 2025-02-22 LAB
ALBUMIN SERPL-MCNC: 3.5 G/DL (ref 3.5–5.2)
ALP SERPL-CCNC: 50 U/L (ref 40–129)
ALT SERPL-CCNC: 8 U/L (ref 5–41)
ANION GAP SERPL CALCULATED.3IONS-SCNC: 11 MMOL/L (ref 8–16)
AST SERPL-CCNC: 16 U/L (ref 5–40)
B PARAP IS1001 DNA NPH QL NAA+NON-PROBE: NOT DETECTED
B PERT.PT PRMT NPH QL NAA+NON-PROBE: NOT DETECTED
BACTERIA URNS QL MICRO: NEGATIVE /HPF
BASOPHILS # BLD: 0.1 K/UL (ref 0–0.2)
BASOPHILS NFR BLD: 1.1 % (ref 0–1)
BILIRUB SERPL-MCNC: 0.6 MG/DL (ref 0.2–1.2)
BILIRUB UR QL STRIP: NEGATIVE
BUN SERPL-MCNC: 27 MG/DL (ref 8–23)
C PNEUM DNA NPH QL NAA+NON-PROBE: NOT DETECTED
CALCIUM SERPL-MCNC: 10 MG/DL (ref 8.8–10.2)
CHLORIDE SERPL-SCNC: 99 MMOL/L (ref 98–107)
CHP ED QC CHECK: NORMAL
CLARITY UR: CLEAR
CO2 SERPL-SCNC: 25 MMOL/L (ref 22–29)
COLOR UR: YELLOW
CREAT SERPL-MCNC: 1.9 MG/DL (ref 0.7–1.2)
CRYSTALS URNS MICRO: ABNORMAL /HPF
EOSINOPHIL # BLD: 0.1 K/UL (ref 0–0.6)
EOSINOPHIL NFR BLD: 1.3 % (ref 0–5)
EPI CELLS #/AREA URNS AUTO: 1 /HPF (ref 0–5)
ERYTHROCYTE [DISTWIDTH] IN BLOOD BY AUTOMATED COUNT: 11.9 % (ref 11.5–14.5)
FLUAV RNA NPH QL NAA+NON-PROBE: NOT DETECTED
FLUBV RNA NPH QL NAA+NON-PROBE: NOT DETECTED
GLUCOSE BLD-MCNC: 105 MG/DL
GLUCOSE BLD-MCNC: 105 MG/DL (ref 70–99)
GLUCOSE SERPL-MCNC: 105 MG/DL (ref 70–99)
GLUCOSE UR STRIP.AUTO-MCNC: NEGATIVE MG/DL
HADV DNA NPH QL NAA+NON-PROBE: NOT DETECTED
HCOV 229E RNA NPH QL NAA+NON-PROBE: NOT DETECTED
HCOV HKU1 RNA NPH QL NAA+NON-PROBE: NOT DETECTED
HCOV NL63 RNA NPH QL NAA+NON-PROBE: NOT DETECTED
HCOV OC43 RNA NPH QL NAA+NON-PROBE: NOT DETECTED
HCT VFR BLD AUTO: 37.3 % (ref 42–52)
HGB BLD-MCNC: 12.4 G/DL (ref 14–18)
HGB UR STRIP.AUTO-MCNC: ABNORMAL MG/L
HMPV RNA NPH QL NAA+NON-PROBE: NOT DETECTED
HPIV1 RNA NPH QL NAA+NON-PROBE: NOT DETECTED
HPIV2 RNA NPH QL NAA+NON-PROBE: NOT DETECTED
HPIV3 RNA NPH QL NAA+NON-PROBE: NOT DETECTED
HPIV4 RNA NPH QL NAA+NON-PROBE: NOT DETECTED
HYALINE CASTS #/AREA URNS AUTO: 1 /HPF (ref 0–8)
IMM GRANULOCYTES # BLD: 0 K/UL
INR PPP: 1.19 (ref 0.88–1.18)
KETONES UR STRIP.AUTO-MCNC: NEGATIVE MG/DL
LEUKOCYTE ESTERASE UR QL STRIP.AUTO: ABNORMAL
LYMPHOCYTES # BLD: 2 K/UL (ref 1.1–4.5)
LYMPHOCYTES NFR BLD: 31.5 % (ref 20–40)
M PNEUMO DNA NPH QL NAA+NON-PROBE: NOT DETECTED
MCH RBC QN AUTO: 31.1 PG (ref 27–31)
MCHC RBC AUTO-ENTMCNC: 33.2 G/DL (ref 33–37)
MCV RBC AUTO: 93.5 FL (ref 80–94)
MONOCYTES # BLD: 0.7 K/UL (ref 0–0.9)
MONOCYTES NFR BLD: 10.3 % (ref 0–10)
NEUTROPHILS # BLD: 3.5 K/UL (ref 1.5–7.5)
NEUTS SEG NFR BLD: 55.3 % (ref 50–65)
NITRITE UR QL STRIP.AUTO: NEGATIVE
PERFORMED ON: ABNORMAL
PH UR STRIP.AUTO: 6 [PH] (ref 5–8)
PLATELET # BLD AUTO: 136 K/UL (ref 130–400)
PMV BLD AUTO: 11.2 FL (ref 9.4–12.4)
POTASSIUM SERPL-SCNC: 4.7 MMOL/L (ref 3.5–5)
PROT SERPL-MCNC: 5.7 G/DL (ref 6.4–8.3)
PROT UR STRIP.AUTO-MCNC: NEGATIVE MG/DL
PROTHROMBIN TIME: 14.7 SEC (ref 12–14.6)
RBC # BLD AUTO: 3.99 M/UL (ref 4.7–6.1)
RBC #/AREA URNS AUTO: 12 /HPF (ref 0–4)
RSV RNA NPH QL NAA+NON-PROBE: NOT DETECTED
RV+EV RNA NPH QL NAA+NON-PROBE: NOT DETECTED
SARS-COV-2 RNA NPH QL NAA+NON-PROBE: NOT DETECTED
SODIUM SERPL-SCNC: 135 MMOL/L (ref 136–145)
SP GR UR STRIP.AUTO: 1.04 (ref 1–1.03)
TROPONIN, HIGH SENSITIVITY: 22 NG/L (ref 0–22)
TSH SERPL DL<=0.005 MIU/L-ACNC: 0.69 UIU/ML (ref 0.27–4.2)
UROBILINOGEN UR STRIP.AUTO-MCNC: 0.2 E.U./DL
WBC # BLD AUTO: 6.3 K/UL (ref 4.8–10.8)
WBC #/AREA URNS AUTO: 1 /HPF (ref 0–5)

## 2025-02-22 PROCEDURE — 0202U NFCT DS 22 TRGT SARS-COV-2: CPT

## 2025-02-22 PROCEDURE — 80175 DRUG SCREEN QUAN LAMOTRIGINE: CPT

## 2025-02-22 PROCEDURE — 96360 HYDRATION IV INFUSION INIT: CPT

## 2025-02-22 PROCEDURE — 80053 COMPREHEN METABOLIC PANEL: CPT

## 2025-02-22 PROCEDURE — 99285 EMERGENCY DEPT VISIT HI MDM: CPT

## 2025-02-22 PROCEDURE — 2580000003 HC RX 258: Performed by: INTERNAL MEDICINE

## 2025-02-22 PROCEDURE — 85610 PROTHROMBIN TIME: CPT

## 2025-02-22 PROCEDURE — 96361 HYDRATE IV INFUSION ADD-ON: CPT

## 2025-02-22 PROCEDURE — 36415 COLL VENOUS BLD VENIPUNCTURE: CPT

## 2025-02-22 PROCEDURE — 84484 ASSAY OF TROPONIN QUANT: CPT

## 2025-02-22 PROCEDURE — 81001 URINALYSIS AUTO W/SCOPE: CPT

## 2025-02-22 PROCEDURE — 70450 CT HEAD/BRAIN W/O DYE: CPT

## 2025-02-22 PROCEDURE — 82962 GLUCOSE BLOOD TEST: CPT

## 2025-02-22 PROCEDURE — 2580000003 HC RX 258: Performed by: STUDENT IN AN ORGANIZED HEALTH CARE EDUCATION/TRAINING PROGRAM

## 2025-02-22 PROCEDURE — 6370000000 HC RX 637 (ALT 250 FOR IP): Performed by: INTERNAL MEDICINE

## 2025-02-22 PROCEDURE — 6360000004 HC RX CONTRAST MEDICATION: Performed by: STUDENT IN AN ORGANIZED HEALTH CARE EDUCATION/TRAINING PROGRAM

## 2025-02-22 PROCEDURE — 85025 COMPLETE CBC W/AUTO DIFF WBC: CPT

## 2025-02-22 PROCEDURE — 71045 X-RAY EXAM CHEST 1 VIEW: CPT

## 2025-02-22 PROCEDURE — 70498 CT ANGIOGRAPHY NECK: CPT

## 2025-02-22 PROCEDURE — 93005 ELECTROCARDIOGRAM TRACING: CPT | Performed by: STUDENT IN AN ORGANIZED HEALTH CARE EDUCATION/TRAINING PROGRAM

## 2025-02-22 PROCEDURE — G0378 HOSPITAL OBSERVATION PER HR: HCPCS

## 2025-02-22 PROCEDURE — 0042T CT BRAIN PERFUSION: CPT

## 2025-02-22 PROCEDURE — 2500000003 HC RX 250 WO HCPCS: Performed by: INTERNAL MEDICINE

## 2025-02-22 PROCEDURE — 84443 ASSAY THYROID STIM HORMONE: CPT

## 2025-02-22 RX ORDER — SODIUM CHLORIDE 9 MG/ML
INJECTION, SOLUTION INTRAVENOUS PRN
Status: DISCONTINUED | OUTPATIENT
Start: 2025-02-22 | End: 2025-02-24 | Stop reason: HOSPADM

## 2025-02-22 RX ORDER — LAMOTRIGINE 100 MG/1
200 TABLET ORAL 2 TIMES DAILY
Status: DISCONTINUED | OUTPATIENT
Start: 2025-02-23 | End: 2025-02-24 | Stop reason: HOSPADM

## 2025-02-22 RX ORDER — LEVOTHYROXINE SODIUM 50 UG/1
50 TABLET ORAL DAILY
Status: DISCONTINUED | OUTPATIENT
Start: 2025-02-23 | End: 2025-02-24 | Stop reason: HOSPADM

## 2025-02-22 RX ORDER — ONDANSETRON 2 MG/ML
4 INJECTION INTRAMUSCULAR; INTRAVENOUS EVERY 6 HOURS PRN
Status: DISCONTINUED | OUTPATIENT
Start: 2025-02-22 | End: 2025-02-24 | Stop reason: HOSPADM

## 2025-02-22 RX ORDER — ENOXAPARIN SODIUM 100 MG/ML
40 INJECTION SUBCUTANEOUS DAILY
Status: DISCONTINUED | OUTPATIENT
Start: 2025-02-23 | End: 2025-02-23

## 2025-02-22 RX ORDER — SODIUM CHLORIDE 0.9 % (FLUSH) 0.9 %
5-40 SYRINGE (ML) INJECTION PRN
Status: DISCONTINUED | OUTPATIENT
Start: 2025-02-22 | End: 2025-02-24 | Stop reason: HOSPADM

## 2025-02-22 RX ORDER — ASPIRIN 300 MG/1
300 SUPPOSITORY RECTAL DAILY
Status: DISCONTINUED | OUTPATIENT
Start: 2025-02-23 | End: 2025-02-24 | Stop reason: HOSPADM

## 2025-02-22 RX ORDER — LABETALOL HYDROCHLORIDE 5 MG/ML
10 INJECTION, SOLUTION INTRAVENOUS EVERY 10 MIN PRN
Status: DISCONTINUED | OUTPATIENT
Start: 2025-02-22 | End: 2025-02-24 | Stop reason: HOSPADM

## 2025-02-22 RX ORDER — 0.9 % SODIUM CHLORIDE 0.9 %
1000 INTRAVENOUS SOLUTION INTRAVENOUS ONCE
Status: COMPLETED | OUTPATIENT
Start: 2025-02-22 | End: 2025-02-22

## 2025-02-22 RX ORDER — ACETAMINOPHEN 650 MG/1
650 SUPPOSITORY RECTAL EVERY 4 HOURS PRN
Status: DISCONTINUED | OUTPATIENT
Start: 2025-02-22 | End: 2025-02-24 | Stop reason: HOSPADM

## 2025-02-22 RX ORDER — IOPAMIDOL 755 MG/ML
70 INJECTION, SOLUTION INTRAVASCULAR
Status: COMPLETED | OUTPATIENT
Start: 2025-02-22 | End: 2025-02-22

## 2025-02-22 RX ORDER — ONDANSETRON 4 MG/1
4 TABLET, ORALLY DISINTEGRATING ORAL EVERY 8 HOURS PRN
Status: DISCONTINUED | OUTPATIENT
Start: 2025-02-22 | End: 2025-02-24 | Stop reason: HOSPADM

## 2025-02-22 RX ORDER — ASPIRIN 81 MG/1
81 TABLET, CHEWABLE ORAL DAILY
Status: DISCONTINUED | OUTPATIENT
Start: 2025-02-23 | End: 2025-02-24 | Stop reason: HOSPADM

## 2025-02-22 RX ORDER — POLYETHYLENE GLYCOL 3350 17 G/17G
17 POWDER, FOR SOLUTION ORAL DAILY PRN
Status: DISCONTINUED | OUTPATIENT
Start: 2025-02-22 | End: 2025-02-24 | Stop reason: HOSPADM

## 2025-02-22 RX ORDER — LAMOTRIGINE 100 MG/1
200 TABLET ORAL DAILY
Status: DISCONTINUED | OUTPATIENT
Start: 2025-02-23 | End: 2025-02-22

## 2025-02-22 RX ORDER — SODIUM CHLORIDE 9 MG/ML
INJECTION, SOLUTION INTRAVENOUS CONTINUOUS
Status: DISCONTINUED | OUTPATIENT
Start: 2025-02-22 | End: 2025-02-24 | Stop reason: HOSPADM

## 2025-02-22 RX ORDER — ATORVASTATIN CALCIUM 20 MG/1
40 TABLET, FILM COATED ORAL NIGHTLY
Status: DISCONTINUED | OUTPATIENT
Start: 2025-02-22 | End: 2025-02-24 | Stop reason: HOSPADM

## 2025-02-22 RX ORDER — ACETAMINOPHEN 325 MG/1
650 TABLET ORAL EVERY 4 HOURS PRN
Status: DISCONTINUED | OUTPATIENT
Start: 2025-02-22 | End: 2025-02-24 | Stop reason: HOSPADM

## 2025-02-22 RX ORDER — SODIUM CHLORIDE 0.9 % (FLUSH) 0.9 %
5-40 SYRINGE (ML) INJECTION EVERY 12 HOURS SCHEDULED
Status: DISCONTINUED | OUTPATIENT
Start: 2025-02-22 | End: 2025-02-24 | Stop reason: HOSPADM

## 2025-02-22 RX ADMIN — ATORVASTATIN CALCIUM 40 MG: 20 TABLET, FILM COATED ORAL at 22:11

## 2025-02-22 RX ADMIN — SODIUM CHLORIDE, PRESERVATIVE FREE 10 ML: 5 INJECTION INTRAVENOUS at 22:12

## 2025-02-22 RX ADMIN — SODIUM CHLORIDE: 9 INJECTION, SOLUTION INTRAVENOUS at 22:09

## 2025-02-22 RX ADMIN — SODIUM CHLORIDE 1000 ML: 9 INJECTION, SOLUTION INTRAVENOUS at 19:05

## 2025-02-22 RX ADMIN — IOPAMIDOL 70 ML: 755 INJECTION, SOLUTION INTRAVENOUS at 16:40

## 2025-02-22 NOTE — ED NOTES
1627 Dr Paul notified of pt condition unable to complete triage questions due to pt status  1628 Dr Paul at bedside    1630 Code stroke called overhead    1633

## 2025-02-22 NOTE — ED NOTES
1630 Dr. Paul called a code stroke     1630 CT notified     1630 PA announcement     1638 Stroke Coordinator notified     1640 Dr. Gonzalez, Neurology, notified     Unknown LKW    Arrived via [facility].     No stroke alert called

## 2025-02-23 ENCOUNTER — APPOINTMENT (OUTPATIENT)
Age: 85
DRG: 683 | End: 2025-02-23
Attending: PSYCHIATRY & NEUROLOGY
Payer: MEDICARE

## 2025-02-23 ENCOUNTER — APPOINTMENT (OUTPATIENT)
Dept: MRI IMAGING | Age: 85
DRG: 683 | End: 2025-02-23
Payer: MEDICARE

## 2025-02-23 PROBLEM — R53.1 WEAKNESS: Status: ACTIVE | Noted: 2025-02-23
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
## 2025-02-23 PROBLEM — F03.90 DEMENTIA (HCC): Status: ACTIVE | Noted: 2025-02-23

## 2025-02-23 PROBLEM — R29.90 STROKE-LIKE EPISODE: Status: ACTIVE | Noted: 2025-02-23

## 2025-02-23 LAB
ANION GAP SERPL CALCULATED.3IONS-SCNC: 11 MMOL/L (ref 8–16)
BACTERIA URNS QL MICRO: NEGATIVE /HPF
BILIRUB UR QL STRIP: NEGATIVE
BUN SERPL-MCNC: 24 MG/DL (ref 8–23)
CALCIUM SERPL-MCNC: 9.9 MG/DL (ref 8.8–10.2)
CHLORIDE SERPL-SCNC: 104 MMOL/L (ref 98–107)
CHOLEST SERPL-MCNC: 151 MG/DL (ref 0–199)
CLARITY UR: CLEAR
CO2 SERPL-SCNC: 24 MMOL/L (ref 22–29)
COLOR UR: YELLOW
CREAT SERPL-MCNC: 1.8 MG/DL (ref 0.7–1.2)
CRYSTALS URNS MICRO: NORMAL /HPF
ECHO AO ASC DIAM: 2.6 CM
ECHO AO ASCENDING AORTA INDEX: 1.31 CM/M2
ECHO AO ROOT DIAM: 2.3 CM
ECHO AO ROOT INDEX: 1.16 CM/M2
ECHO AO SINUS VALSALVA DIAM: 2.8 CM
ECHO AO SINUS VALSALVA INDEX: 1.41 CM/M2
ECHO AO ST JNCT DIAM: 2.2 CM
ECHO AV AREA PEAK VELOCITY: 2.2 CM2
ECHO AV AREA VTI: 2 CM2
ECHO AV AREA/BSA PEAK VELOCITY: 1.1 CM2/M2
ECHO AV AREA/BSA VTI: 1 CM2/M2
ECHO AV MEAN GRADIENT: 1 MMHG
ECHO AV MEAN VELOCITY: 0.5 M/S
ECHO AV PEAK GRADIENT: 3 MMHG
ECHO AV PEAK VELOCITY: 0.8 M/S
ECHO AV VELOCITY RATIO: 0.75
ECHO AV VTI: 17.4 CM
ECHO BSA: 1.98 M2
ECHO IVC PROX: 1.2 CM
ECHO LA AREA 2C: 7.9 CM2
ECHO LA AREA 4C: 9.6 CM2
ECHO LA DIAMETER INDEX: 1.41 CM/M2
ECHO LA DIAMETER: 2.8 CM
ECHO LA MAJOR AXIS: 3.1 CM
ECHO LA MINOR AXIS: 2.9 CM
ECHO LA TO AORTIC ROOT RATIO: 1.22
ECHO LA VOL BP: 21 ML (ref 18–58)
ECHO LA VOL MOD A2C: 17 ML (ref 18–58)
ECHO LA VOL MOD A4C: 24 ML (ref 18–58)
ECHO LA VOL/BSA BIPLANE: 11 ML/M2 (ref 16–34)
ECHO LA VOLUME INDEX MOD A2C: 9 ML/M2 (ref 16–34)
ECHO LA VOLUME INDEX MOD A4C: 12 ML/M2 (ref 16–34)
ECHO LV E' LATERAL VELOCITY: 16.2 CM/S
ECHO LV E' SEPTAL VELOCITY: 9.03 CM/S
ECHO LV EDV A2C: 74 ML
ECHO LV EDV A4C: 113 ML
ECHO LV EDV INDEX A4C: 57 ML/M2
ECHO LV EDV NDEX A2C: 37 ML/M2
ECHO LV EF PHYSICIAN: 55 %
ECHO LV EJECTION FRACTION A2C: 73 %
ECHO LV EJECTION FRACTION A4C: 67 %
ECHO LV EJECTION FRACTION BIPLANE: 68 % (ref 55–100)
ECHO LV ESV A2C: 20 ML
ECHO LV ESV A4C: 38 ML
ECHO LV ESV INDEX A2C: 10 ML/M2
ECHO LV ESV INDEX A4C: 19 ML/M2
ECHO LV FRACTIONAL SHORTENING: 28 % (ref 28–44)
ECHO LV INTERNAL DIMENSION DIASTOLE INDEX: 2.31 CM/M2
ECHO LV INTERNAL DIMENSION DIASTOLIC: 4.6 CM (ref 4.2–5.9)
ECHO LV INTERNAL DIMENSION SYSTOLIC INDEX: 1.66 CM/M2
ECHO LV INTERNAL DIMENSION SYSTOLIC: 3.3 CM
ECHO LV IVSD: 1.2 CM (ref 0.6–1)
ECHO LV MASS 2D: 192.9 G (ref 88–224)
ECHO LV MASS INDEX 2D: 97 G/M2 (ref 49–115)
ECHO LV POSTERIOR WALL DIASTOLIC: 1.1 CM (ref 0.6–1)
ECHO LV RELATIVE WALL THICKNESS RATIO: 0.48
ECHO LVOT AREA: 2.8 CM2
ECHO LVOT AV VTI INDEX: 0.69
ECHO LVOT DIAM: 1.9 CM
ECHO LVOT MEAN GRADIENT: 1 MMHG
ECHO LVOT PEAK GRADIENT: 2 MMHG
ECHO LVOT PEAK VELOCITY: 0.6 M/S
ECHO LVOT STROKE VOLUME INDEX: 17.1 ML/M2
ECHO LVOT SV: 34 ML
ECHO LVOT VTI: 12 CM
ECHO MV A VELOCITY: 0.53 M/S
ECHO MV E DECELERATION TIME (DT): 423 MS
ECHO MV E VELOCITY: 0.59 M/S
ECHO MV E/A RATIO: 1.11
ECHO MV E/E' LATERAL: 3.64
ECHO MV E/E' RATIO (AVERAGED): 5.09
ECHO MV E/E' SEPTAL: 6.53
ECHO RA AREA 4C: 9.6 CM2
ECHO RA END SYSTOLIC VOLUME APICAL 4 CHAMBER INDEX BSA: 10 ML/M2
ECHO RA VOLUME: 20 ML
ECHO RV BASAL DIMENSION: 3.3 CM
ECHO RV INTERNAL DIMENSION: 2.5 CM
ECHO RV LONGITUDINAL DIMENSION: 7.9 CM
ECHO RV MID DIMENSION: 3.7 CM
ECHO RV TAPSE: 2 CM (ref 1.7–?)
EPI CELLS #/AREA URNS AUTO: 0 /HPF (ref 0–5)
ERYTHROCYTE [DISTWIDTH] IN BLOOD BY AUTOMATED COUNT: 11.9 % (ref 11.5–14.5)
GLUCOSE BLD-MCNC: 136 MG/DL (ref 70–99)
GLUCOSE SERPL-MCNC: 93 MG/DL (ref 70–99)
GLUCOSE UR STRIP.AUTO-MCNC: NEGATIVE MG/DL
HBA1C MFR BLD: 5.6 % (ref 4–5.6)
HCT VFR BLD AUTO: 36.4 % (ref 42–52)
HDLC SERPL-MCNC: 53 MG/DL (ref 40–60)
HGB BLD-MCNC: 11.9 G/DL (ref 14–18)
HGB UR STRIP.AUTO-MCNC: NEGATIVE MG/L
HYALINE CASTS #/AREA URNS AUTO: 2 /HPF (ref 0–8)
KETONES UR STRIP.AUTO-MCNC: NEGATIVE MG/DL
LDLC SERPL CALC-MCNC: 88 MG/DL
LEUKOCYTE ESTERASE UR QL STRIP.AUTO: ABNORMAL
MCH RBC QN AUTO: 30.7 PG (ref 27–31)
MCHC RBC AUTO-ENTMCNC: 32.7 G/DL (ref 33–37)
MCV RBC AUTO: 94.1 FL (ref 80–94)
NITRITE UR QL STRIP.AUTO: NEGATIVE
PERFORMED ON: ABNORMAL
PH UR STRIP.AUTO: 5.5 [PH] (ref 5–8)
PLATELET # BLD AUTO: 148 K/UL (ref 130–400)
PMV BLD AUTO: 11.6 FL (ref 9.4–12.4)
POTASSIUM SERPL-SCNC: 4.3 MMOL/L (ref 3.5–5)
PROT UR STRIP.AUTO-MCNC: NEGATIVE MG/DL
RBC # BLD AUTO: 3.87 M/UL (ref 4.7–6.1)
RBC #/AREA URNS AUTO: 0 /HPF (ref 0–4)
SODIUM SERPL-SCNC: 139 MMOL/L (ref 136–145)
SP GR UR STRIP.AUTO: 1.01 (ref 1–1.03)
TRIGL SERPL-MCNC: 51 MG/DL (ref 0–149)
UROBILINOGEN UR STRIP.AUTO-MCNC: 0.2 E.U./DL
WBC # BLD AUTO: 6.8 K/UL (ref 4.8–10.8)
WBC #/AREA URNS AUTO: 1 /HPF (ref 0–5)

## 2025-02-23 PROCEDURE — 97530 THERAPEUTIC ACTIVITIES: CPT

## 2025-02-23 PROCEDURE — 96374 THER/PROPH/DIAG INJ IV PUSH: CPT

## 2025-02-23 PROCEDURE — 83036 HEMOGLOBIN GLYCOSYLATED A1C: CPT

## 2025-02-23 PROCEDURE — 1200000000 HC SEMI PRIVATE

## 2025-02-23 PROCEDURE — 99223 1ST HOSP IP/OBS HIGH 75: CPT | Performed by: PSYCHIATRY & NEUROLOGY

## 2025-02-23 PROCEDURE — 93306 TTE W/DOPPLER COMPLETE: CPT | Performed by: INTERNAL MEDICINE

## 2025-02-23 PROCEDURE — 82962 GLUCOSE BLOOD TEST: CPT

## 2025-02-23 PROCEDURE — 70551 MRI BRAIN STEM W/O DYE: CPT

## 2025-02-23 PROCEDURE — 6360000002 HC RX W HCPCS: Performed by: INTERNAL MEDICINE

## 2025-02-23 PROCEDURE — 6360000004 HC RX CONTRAST MEDICATION: Performed by: PSYCHIATRY & NEUROLOGY

## 2025-02-23 PROCEDURE — 6370000000 HC RX 637 (ALT 250 FOR IP): Performed by: INTERNAL MEDICINE

## 2025-02-23 PROCEDURE — 80048 BASIC METABOLIC PNL TOTAL CA: CPT

## 2025-02-23 PROCEDURE — 81001 URINALYSIS AUTO W/SCOPE: CPT

## 2025-02-23 PROCEDURE — 80061 LIPID PANEL: CPT

## 2025-02-23 PROCEDURE — 2500000003 HC RX 250 WO HCPCS: Performed by: INTERNAL MEDICINE

## 2025-02-23 PROCEDURE — 97161 PT EVAL LOW COMPLEX 20 MIN: CPT

## 2025-02-23 PROCEDURE — 96372 THER/PROPH/DIAG INJ SC/IM: CPT

## 2025-02-23 PROCEDURE — 36415 COLL VENOUS BLD VENIPUNCTURE: CPT

## 2025-02-23 PROCEDURE — 96375 TX/PRO/DX INJ NEW DRUG ADDON: CPT

## 2025-02-23 PROCEDURE — 6360000002 HC RX W HCPCS: Performed by: STUDENT IN AN ORGANIZED HEALTH CARE EDUCATION/TRAINING PROGRAM

## 2025-02-23 PROCEDURE — 85027 COMPLETE CBC AUTOMATED: CPT

## 2025-02-23 PROCEDURE — C8929 TTE W OR WO FOL WCON,DOPPLER: HCPCS

## 2025-02-23 RX ORDER — LORAZEPAM 2 MG/ML
1 INJECTION INTRAMUSCULAR EVERY 6 HOURS PRN
Status: DISCONTINUED | OUTPATIENT
Start: 2025-02-23 | End: 2025-02-24 | Stop reason: HOSPADM

## 2025-02-23 RX ORDER — HALOPERIDOL 5 MG/ML
5 INJECTION INTRAMUSCULAR ONCE
Status: COMPLETED | OUTPATIENT
Start: 2025-02-23 | End: 2025-02-23

## 2025-02-23 RX ORDER — LABETALOL HYDROCHLORIDE 5 MG/ML
20 INJECTION, SOLUTION INTRAVENOUS ONCE
Status: COMPLETED | OUTPATIENT
Start: 2025-02-23 | End: 2025-02-23

## 2025-02-23 RX ADMIN — LAMOTRIGINE 200 MG: 100 TABLET ORAL at 22:07

## 2025-02-23 RX ADMIN — SULFUR HEXAFLUORIDE 2 ML: 60.7; .19; .19 INJECTION, POWDER, LYOPHILIZED, FOR SUSPENSION INTRAVENOUS; INTRAVESICAL at 09:32

## 2025-02-23 RX ADMIN — LAMOTRIGINE 200 MG: 100 TABLET ORAL at 08:34

## 2025-02-23 RX ADMIN — ENOXAPARIN SODIUM 40 MG: 100 INJECTION SUBCUTANEOUS at 08:35

## 2025-02-23 RX ADMIN — ASPIRIN 81 MG: 81 TABLET, CHEWABLE ORAL at 08:34

## 2025-02-23 RX ADMIN — ATORVASTATIN CALCIUM 40 MG: 20 TABLET, FILM COATED ORAL at 22:07

## 2025-02-23 RX ADMIN — SODIUM CHLORIDE, PRESERVATIVE FREE 10 ML: 5 INJECTION INTRAVENOUS at 20:34

## 2025-02-23 RX ADMIN — HALOPERIDOL LACTATE 5 MG: 5 INJECTION, SOLUTION INTRAMUSCULAR at 16:40

## 2025-02-23 RX ADMIN — LABETALOL HYDROCHLORIDE 20 MG: 5 INJECTION, SOLUTION INTRAVENOUS at 19:06

## 2025-02-23 RX ADMIN — LORAZEPAM 1 MG: 2 INJECTION INTRAMUSCULAR; INTRAVENOUS at 16:02

## 2025-02-23 RX ADMIN — LEVOTHYROXINE SODIUM 50 MCG: 0.05 TABLET ORAL at 05:12

## 2025-02-23 NOTE — ED NOTES
ED TO INPATIENT SBAR HANDOFF    Patient Name: Joseph Salazar   : 1940  84 y.o.   Family/Caregiver Present: Yes  Code Status Order: Prior    C-SSRS:    Sitter No  Restraints:         Situation  Chief Complaint:   Chief Complaint   Patient presents with    Altered Mental Status     Patient Diagnosis: NIDHI (acute kidney injury) [N17.9]     Brief Description of Patient's Condition: AMS with NIDHI, pt presents with s/s of stroke, workup was negative.  Pt came out of this episode and started talking with wife around  and has been alert since and can answer questions appropriately. Pt has trigeminal neuralgia and has been having issues with that as well  Mental Status: alert and coherent  Arrived from: home    Imaging:   CTA HEAD NECK W CONTRAST   Final Result   1.  The cavernous internal carotid arteries demonstrate calcific atherosclerotic disease with no suggestion of flow restricting stenosis.  The anterior and middle cerebral arteries appear adequately patent.   2.  The upper right vertebral artery is hypoplastic and patent.  The upper left vertebral artery, the basilar artery and the posterior cerebral arteries are within normal limits and patent.   3.  The common, internal and external carotid arteries are adequately patent with no significant atherosclerotic disease.   4.  The cervical level vertebral arteries are adequately patent with the left artery slightly dominant.   5.  Left thyroid lobe enlargement.  This can be correlated with follow-up ultrasound if not   previously performed.   - - - - -        All CT scans are performed using dose optimization techniques as appropriate to the performed exam and include    at least one of the following: Automated exposure control, adjustment of the mA and/or kV according to size, and the use of iterative reconstruction technique.        ______________________________________    Electronically signed by: LAMONT POP M.D.   Date:     2025   Time:    17:20

## 2025-02-23 NOTE — CONSULTS
[facility] Consult        Patient:   Joseph Salazar  MR#:    [id number]  Account Number:                   [id number]      Room:    [facility]424-02   YOB: 1940  Date of Progress Note: 2/23/2025  Time of Note                           6:52 AM  Attending Physician:  Mauro Guevara MD  Consulting Physician:   Saul Gonzalez M.D.        CHIEF COMPLAINT: Strokelike symptoms/fall      HISTORY OF PRESENT ILLNESS:   This 84 y.o. male with a past medical history screen for hypertension, trigeminal neuralgia, and mild dementia is seen for strokelike symptoms.  The patient's wife indicates he woke up and took his medications and went to the bathroom.  She thinks he was still not quite awake and he fell.  He often uses a walker or rollator to ambulate.  When he was on the ground she was unable to get him up and EMS was called.  When he arrived to the ER he was probably nonverbal and appeared lethargic.  There was a question of right gaze deviation, leaning to the right, right facial droop and left-sided weakness.  His wife indicates that by the time she got to the ER he appeared back to baseline.  Denies any previous history of stroke.  No history of heart disease.  CT of the head with no acute changes noted.  CT perfusion of the brain normal.  CTA of the head and neck with no significant stenosis or thrombosis noted.    REVIEW OF SYSTEMS:  Constitutional - No fever or chills.  No diaphoresis or significant fatigue.  HENT -  No tinnitus or significant hearing loss.  Eyes - no sudden vision change or eye pain  Respiratory - no significant shortness of breath or cough  Cardiovascular - no chest pain No palpitations or significant leg swelling  Gastrointestinal - no abdominal swelling or pain.    Genitourinary - No difficulty urinating, dysuria  Musculoskeletal - no back pain or myalgia.  Skin - no color change or rash  Neurologic - No seizures.  No lateralizing weakness.  Hematologic - no easy bruising or

## 2025-02-23 NOTE — PROGRESS NOTES
Daily Progress Note    Date:2025  Patient: Joseph Salazar  : 1940  MRN:[id number]  CODE:Full Code No additional code details  PCP:No primary care provider on file.    Admit Date: 2025  4:29 PM   LOS: 0 days       Subjective:     Patient seen and examined this a.m.   he is somnolent but arousable and conversant without any obvious dysarthria or aphasia noted.  Able to follow commands and can freely move all extremities.  Patient's wife at bedside confirms that he has had ongoing poor p.o. intake.  No reports of any recent nausea vomiting or diarrhea.      Summary: 84-year-old male with remote history of intracranial hemorrhage hospitalized at outside facility, hypertension, trigeminal neuralgia on Lamictal presented with strokelike symptoms after his wife believes that he went to the bathroom and sustained a fall, transiently was nonverbal and lethargic with questionable right gaze deviation, questionable facial droop and left-sided weakness, however neurodeficits resolved by the time he had arrived to the emergency department.  CT head no acute finding, CT perfusion study normal.  CTA head and neck with no significant stenosis or thrombosis.  He was found to have acute kidney injury in setting of ongoing poor p.o. intake with BUN 27, creatinine up to 1.9 (no recent labs for baseline comparison), he was given IV fluids and monitored further seen in consultation by neurology.  Obtain MRI brain and echocardiogram.      Objective:      Vital signs in last 24 hours:  Patient Vitals for the past 24 hrs:   BP Temp Temp src Pulse Resp SpO2 Height Weight   25 0807 (!) 152/85 98.2 °F (36.8 °C) Temporal 56 -- 94 % -- --   25 0500 135/69 97.9 °F (36.6 °C) Temporal 51 18 95 % -- --   25 0100 -- -- -- 50 -- -- -- --   25 0032 (!) 141/71 98.2 °F (36.8 °C) Temporal 53 16 96 % -- --   25 (!) 153/72 98.4 °F (36.9 °C) Temporal -- -- -- -- --   25  (!) 150/72 -- -- 73 22 99 % --

## 2025-02-23 NOTE — PROGRESS NOTES
Physical Therapy  Facility/Department: [facility] ONCOLOGY UNIT  Physical Therapy Initial Assessment    Name: Joseph Salazar  : 1940  MRN: [id number]  Date of Service: 2025    Discharge Recommendations:  Continue to assess pending progress          Patient Diagnosis(es): The primary encounter diagnosis was Altered mental status, unspecified altered mental status type. Diagnoses of NIDHI (acute kidney injury) and Cerebrovascular accident (CVA), unspecified mechanism (HCC) were also pertinent to this visit.  Past Medical History:  has a past medical history of Hyperlipidemia, Hypertension, Palliative care patient, Sleep apnea, Thyroid disease, and Trigeminal neuropathy.  Past Surgical History:  has a past surgical history that includes Cholecystectomy and hernia repair.    Assessment  Body Structures, Functions, Activity Limitations Requiring Skilled Therapeutic Intervention: Decreased functional mobility ;Decreased ADL status;Decreased ROM;Decreased strength;Decreased safe awareness;Decreased cognition;Decreased endurance;Decreased balance  Assessment: Pt LIMITED BY COGNITIVE STATE/AROUSAL LEVEL. SEEN MOVING ALL EXTREMITIES SPONTANEOUSLY. ASSISTED NURSE WITH LINEN CHANGE DUE TO BEING INCONTINENT. WILL PROGRESS AS ABLE.  Requires PT Follow-Up: Yes  Activity Tolerance  Activity Tolerance: Treatment limited secondary to medical complications  Activity Tolerance Comments: NURSE STATES SHE IS GOING TO NOTIFY MD OF POSSIBLE CHANGE IN STATUS FROM EARLIER IN DAY    Plan  Physical Therapy Plan  General Plan: 5-7 times per week  Current Treatment Recommendations: Strengthening, ROM, Balance training, Functional mobility training, Transfer training, Gait training, Safety education & training, Patient/Caregiver education & training, Endurance training, Equipment evaluation, education, & procurement  Additional Comments: WILL PROGRESS WITH TRANSFERS AS MENTAL STATUS IMPROVES  Safety Devices  Type of Devices: Bed alarm in place,

## 2025-02-23 NOTE — PROGRESS NOTES
4 Eyes Skin Assessment     NAME:  Joseph Salazar  YOB: 1940  MEDICAL RECORD NUMBER:  [id number]    The patient is being assessed for  Admission    I agree that at least one RN has performed a thorough Head to Toe Skin Assessment on the patient. ALL assessment sites listed below have been assessed.      Areas assessed by both nurses:    Head, Face, Ears, Shoulders, Back, Chest, Arms, Elbows, Hands, Sacrum. Buttock, Coccyx, Ischium, Legs. Feet and Heels, and Under Medical Devices         Does the Patient have a Wound? No noted wound(s)       Salvatore Prevention initiated by RN: Yes  Wound Care Orders initiated by RN: No    Pressure Injury (Stage 3,4, Unstageable, DTI, NWPT, and Complex wounds) if present, place Wound referral order by RN under : No    New Ostomies, if present place, Ostomy referral order under : No     Nurse 1 eSignature: Electronically signed by Jimmy Gay RN on 2/22/25 at 11:32 PM CST    **SHARE this note so that the co-signing nurse can place an eSignature**    Nurse 2 eSignature: Electronically signed by Edelmira Narvaez LPN on 2/23/25 at 2:39 AM CST

## 2025-02-24 VITALS
SYSTOLIC BLOOD PRESSURE: 138 MMHG | BODY MASS INDEX: 23.03 KG/M2 | DIASTOLIC BLOOD PRESSURE: 77 MMHG | HEART RATE: 90 BPM | HEIGHT: 72 IN | WEIGHT: 170 LBS | OXYGEN SATURATION: 96 % | TEMPERATURE: 97.7 F | RESPIRATION RATE: 18 BRPM

## 2025-02-24 PROBLEM — G45.9 TIA (TRANSIENT ISCHEMIC ATTACK): Status: ACTIVE | Noted: 2025-02-24

## 2025-02-24 PROCEDURE — 94760 N-INVAS EAR/PLS OXIMETRY 1: CPT

## 2025-02-24 PROCEDURE — 99233 SBSQ HOSP IP/OBS HIGH 50: CPT | Performed by: PSYCHIATRY & NEUROLOGY

## 2025-02-24 PROCEDURE — 6370000000 HC RX 637 (ALT 250 FOR IP): Performed by: INTERNAL MEDICINE

## 2025-02-24 PROCEDURE — 51798 US URINE CAPACITY MEASURE: CPT

## 2025-02-24 RX ORDER — ATORVASTATIN CALCIUM 40 MG/1
40 TABLET, FILM COATED ORAL NIGHTLY
Qty: 90 TABLET | Refills: 0 | Status: SHIPPED | OUTPATIENT
Start: 2025-02-24

## 2025-02-24 RX ADMIN — LEVOTHYROXINE SODIUM 50 MCG: 0.05 TABLET ORAL at 08:12

## 2025-02-24 RX ADMIN — LAMOTRIGINE 200 MG: 100 TABLET ORAL at 08:11

## 2025-02-24 RX ADMIN — ASPIRIN 81 MG: 81 TABLET, CHEWABLE ORAL at 08:11

## 2025-02-24 NOTE — CARE COORDINATION
Home Health order faxed to [facility].  Awaiting approval/denial.  [facility]  P [phone]  F [phone]    Wheelchair ordered from [facility].  Wheelchair is to be delivered to patient's room.  [facility]   P [phone]  F [phone]  Electronically signed by Roro Meng RN on 2/24/2025 at 10:54 AM

## 2025-02-24 NOTE — PROGRESS NOTES
Physical Therapy  Name: Joseph Salazar  MRN:  [id number]  Date of service:  2/24/2025    Family declined therapy due to pt going home today    Electronically signed by Rosa Loving PTA on 2/24/2025 at 10:47 AM

## 2025-02-24 NOTE — CARE COORDINATION
Case Management Assessment  Initial Evaluation    Date/Time of Evaluation: 2/24/2025 10:57 AM  Assessment Completed by: Roro Meng RN    If patient is discharged prior to next notation, then this note serves as note for discharge by case management.    Patient Name: Joseph Salazar                   YOB: 1940  Diagnosis: NIDHI (acute kidney injury) [N17.9]  Altered mental status, unspecified altered mental status type [R41.82]                   Date / Time: 2/22/2025  4:29 PM    Patient Admission Status: Inpatient   Readmission Risk (Low < 19, Mod (19-27), High > 27): Readmission Risk Score: 15.4    Current PCP: Hemal Castillo MD  PCP verified by CM? Yes (sees Dr. Hemal Castillo)    Chart Reviewed: Yes      History Provided by: Spouse  Patient Orientation: Alert and Oriented, Person    Patient Cognition: Alert    Hospitalization in the last 30 days (Readmission):  No    If yes, Readmission Assessment in CM Navigator will be completed.    Advance Directives:      Code Status: Full Code   Patient's Primary Decision Maker is:      Primary Decision Maker: Marjorie Salazar - Spouse - [phone]    Discharge Planning:    Patient lives with: Spouse/Significant Other Type of Home: House  Primary Care Giver: Spouse  Patient Support Systems include: Spouse/Significant Other   Current Financial resources: Medicare  Current community resources: None  Current services prior to admission: Durable Medical Equipment            Current DME: Walker            Type of Home Care services:  Aide Services, OT, PT, Nursing Services, Skilled Therapy    ADLS  Prior functional level: Assistance with the following:, Bathing, Dressing, Toileting, Cooking, Housework, Shopping, Mobility  Current functional level: Assistance with the following:, Bathing, Dressing, Toileting, Cooking, Housework, Shopping, Mobility    PT AM-PAC:   /24  OT AM-PAC:   /24    Family can provide assistance at DC: Yes  Would you like Case Management to discuss

## 2025-02-24 NOTE — PROGRESS NOTES
Patient:   Joseph Salazar  MR#:    [id number]   Room:    0424/424-02   YOB: 1940  Date of Progress Note: 2/24/2025  Time of Note                           6:15 AM  Consulting Physician:   Saul Gonzalez M.D.  Attending Physician:  Juan Carlos Dixon MD     Chief complaint Strokelike symptoms/fall     S:This 84 y.o. male  with a past medical history screen for hypertension, trigeminal neuralgia, and mild dementia is seen for strokelike symptoms.  The patient's wife indicates he woke up and took his medications and went to the bathroom.  She thinks he was still not quite awake and he fell.  He often uses a walker or rollator to ambulate.  When he was on the ground she was unable to get him up and EMS was called.  When he arrived to the ER he was probably nonverbal and appeared lethargic.  There was a question of right gaze deviation, leaning to the right, right facial droop and left-sided weakness.  His wife indicates that by the time she got to the ER he appeared back to baseline.  Denies any previous history of stroke.  No history of heart disease.  CT of the head with no acute changes noted.  CT perfusion of the brain normal.  CTA of the head and neck with no significant stenosis or thrombosis noted.  No new complaints.  Feeling better.    REVIEW OF SYSTEMS:  Constitutional: No fevers No chills  Neck:No stiffness  Respiratory: No shortness of breath  Cardiovascular: No chest pain No palpitations  Gastrointestinal: No abdominal pain    Genitourinary: No Dysuria  Neurological: No headache, no confusion    Past Medical History:      Diagnosis Date    Hyperlipidemia 2/22/2025    Hypertension     Palliative care patient 06/26/2020    Sleep apnea     Thyroid disease     Trigeminal neuropathy        Past Surgical History:      Procedure Laterality Date    CHOLECYSTECTOMY      HERNIA REPAIR         Medications in Hospital:      Current Facility-Administered Medications:     LORazepam (ATIVAN) injection 1 mg, 1

## 2025-02-24 NOTE — DISCHARGE SUMMARY
Discharge Summary    NAME: Joseph Salazar  :  1940  MRN:  [id number]    Admit date:  2025  Discharge date:  2025    Advance Directive: Full Code    Consults: neurology    Primary Care Physician:  Hemal Castillo MD    Discharge Diagnoses:  Principal Problem:    TIA (transient ischemic attack)  Active Problems:    Transient alteration of awareness    Stroke-like episode    NIDHI (acute kidney injury)    Trigeminal neuropathy    Thyroid disease    Hyperlipidemia    Fall    Dementia (HCC)    Weakness  Resolved Problems:    * No resolved hospital problems. *      Significant Diagnostic Studies:   MRI brain without contrast    Result Date: 2025  EXAM:  MRI OF THE BRAIN WITHOUT CONTRAST  TECHNIQUE: Multiple MRI sequences of the brain were performed without contrast.  HISTORY:  Transient ischemia.  Combative.  COMPARISON:  2025  FINDINGS:  No restricted diffusion evident.  Generalized cortical atrophy. Ventricular size proportional to involutional change.  Extensive white matter signal changes which is likely secondary to chronic small vessel ischemia.  No abnormal extra-axial collection.  Patent basilar cisterns.  Large vessel flow voids are unremarkable.  Orbits are unremarkable.  Mucosal thickening ethmoid air cells.  Mastoid air cells are clear.  Susceptibility artifact along the left frontal parietal scalp region.  Possibly secondary to a small subcutaneous metallic foreign body in the scalp on CT sagittal image 44.       1. No evidence of acute infarct/restricted diffusion 2. Atrophy 3. White matter signal changes compatible with diffuse chronic microvascular ischemic change.       ______________________________________ Electronically signed by: ZOHRA GARCIA M.D. Date:     2025 Time:    17:14     Echo (TTE) complete (PRN contrast/bubble/strain/3D)    Result Date: 2025    Left Ventricle: Normal left ventricular systolic function. EF by visual approximation is 55%. Left ventricle size is

## 2025-02-24 NOTE — PLAN OF CARE
would not be effective before applying the restraint  2. Evaluate the patient's condition at the time of restraint application  3. Inform patient/family regarding the reason for restraint  4. Q2H: Monitor safety, psychosocial status, comfort, nutrition and hydration  2/24/2025 1032 by Bridget Liu RN  Outcome: Completed  2/23/2025 2246 by Jimmy Gay RN  Outcome: Progressing  Flowsheets  Taken 2/23/2025 1940 by Jimmy Gay RN  Remains free of injury from restraints (restraint for interference with medical device): Determine that other, less restrictive measures have been tried or would not be effective before applying the restraint  Taken 2/23/2025 1840 by Raji Grove LPN  Remains free of injury from restraints (restraint for interference with medical device):   Determine that other, less restrictive measures have been tried or would not be effective before applying the restraint   Every 2 hours: Monitor safety, psychosocial status, comfort, nutrition and hydration

## 2025-02-24 NOTE — CONSULTS
Pt discharged before palliative care could make visit.    Electronically signed by Mally Riley RN on 2/24/2025 at 1:24 PM

## 2025-02-25 LAB
EKG P AXIS: 62 DEGREES
EKG P-R INTERVAL: 216 MS
EKG Q-T INTERVAL: 402 MS
EKG QRS DURATION: 164 MS
EKG QTC CALCULATION (BAZETT): 437 MS
EKG T AXIS: 75 DEGREES
LAMOTRIGINE SERPL-MCNC: 16.5 UG/ML (ref 3–15)

## 2025-02-25 PROCEDURE — 93010 ELECTROCARDIOGRAM REPORT: CPT | Performed by: INTERNAL MEDICINE
